# Patient Record
Sex: MALE | Race: WHITE | Employment: OTHER | URBAN - METROPOLITAN AREA
[De-identification: names, ages, dates, MRNs, and addresses within clinical notes are randomized per-mention and may not be internally consistent; named-entity substitution may affect disease eponyms.]

---

## 2017-08-29 PROBLEM — M17.11 PRIMARY OSTEOARTHRITIS OF RIGHT KNEE: Status: ACTIVE | Noted: 2017-08-29

## 2018-03-26 ENCOUNTER — HOSPITAL ENCOUNTER (OUTPATIENT)
Age: 58
Discharge: HOME | End: 2018-03-28

## 2018-03-26 PROCEDURE — 86706 HEP B SURFACE ANTIBODY: CPT

## 2018-03-26 PROCEDURE — 86787 VARICELLA-ZOSTER ANTIBODY: CPT

## 2018-03-26 PROCEDURE — 36415 COLL VENOUS BLD VENIPUNCTURE: CPT

## 2018-03-26 PROCEDURE — 86762 RUBELLA ANTIBODY: CPT

## 2018-03-26 PROCEDURE — 86735 MUMPS ANTIBODY: CPT

## 2018-03-26 PROCEDURE — 86765 RUBEOLA ANTIBODY: CPT

## 2018-03-27 LAB
HBV SURFACE AB TITR SER: REACTIVE {TITER}
MEASLES IMMUNE (IGG): NORMAL
MUMPS AB IGG: NORMAL
RUBELLA ANTIBODY IGG: NORMAL
VARICELLA-ZOSTER VIRUS AB, IGG: NORMAL

## 2018-07-24 ENCOUNTER — OFFICE VISIT (OUTPATIENT)
Dept: ORTHOPEDIC SURGERY | Age: 58
End: 2018-07-24
Payer: COMMERCIAL

## 2018-07-24 VITALS — WEIGHT: 192 LBS | BODY MASS INDEX: 27.49 KG/M2 | TEMPERATURE: 98.5 F | HEIGHT: 70 IN

## 2018-07-24 DIAGNOSIS — M77.11 RIGHT LATERAL EPICONDYLITIS: Primary | ICD-10-CM

## 2018-07-24 DIAGNOSIS — S46.219A BICEPS TENDON TEAR: ICD-10-CM

## 2018-07-24 PROCEDURE — 99214 OFFICE O/P EST MOD 30 MIN: CPT | Performed by: ORTHOPAEDIC SURGERY

## 2018-07-24 PROCEDURE — 20551 NJX 1 TENDON ORIGIN/INSJ: CPT | Performed by: ORTHOPAEDIC SURGERY

## 2018-07-24 RX ORDER — TRIAMCINOLONE ACETONIDE 40 MG/ML
20 INJECTION, SUSPENSION INTRA-ARTICULAR; INTRAMUSCULAR ONCE
Status: COMPLETED | OUTPATIENT
Start: 2018-07-24 | End: 2018-07-24

## 2018-07-24 RX ADMIN — TRIAMCINOLONE ACETONIDE 20 MG: 40 INJECTION, SUSPENSION INTRA-ARTICULAR; INTRAMUSCULAR at 15:52

## 2018-07-24 NOTE — PROGRESS NOTES
(-) multiple sclerosis, (-) muscular dystrophy, (-) RSD,(-) joint pain (-)swelling, (-) joint pain,swelling. Neurologic: (-) epilepsy, (-)seizures,(-) brain tumor,(-) TIA, (-)stroke, (-)headaches, (-)Parkinson disease,(-) memory loss, (-) LOC. Cardiovascular: (-) Chest pain, (-) swelling in legs/feet, (-) SOB, (-) cramping in legs/feet with walking. Subjective:    Constitution:    The patient is alert and oriented x 3, appears to be stated age and in no distress. Ht. 5 ft 10 in., Wt. 192 lbs. Skin:    Upon inspection: the skin appears warm, dry and intact. There is not a previous scar over the affected area. There is not any cellulitis, lymphedema or cutaneous lesions noted in the lower extremities. Upon palpation there is no induration noted. Neurologic:    Gait: normal;  Motor exam of the upper extremities show: The reflexes in biceps/triceps/brachioradialis are equal and symmetric. Sensory exam C5-T1 are normal bilaterally. Cardiovascular: The vascular exam is normal and is well perfused to distal extremities. There are 2+ radial pulses bilaterally, and motor and sensation is intact to median, ulnar, and radial, musclocutaneus, and axillary nerve distribution and grossly symmetric bilaterally. There is cap refill noted less than two seconds in all digits. There is not edema of the bilateral upper extremities. There is not varicosities noted in the distal extremities. Lymph:    Upon palpation,  there is no lymphadenopathy noted in bilateral upper extremities. Musculoskeletal:    Cervical Exam:    On physical exam, Isabella Mulligan is well-developed, well-nourished, oriented to person, place and time. his gait is normal.  On evaluation of his cervical spine, he has full range of motion of the cervical spine without pain. There is no cervical tenderness to palpation.      Shoulder Exam:     On evaluation of his bilaterally upper extremities, his bilateral shoulder has well.  Follow up after MRI

## 2018-07-25 ENCOUNTER — HOSPITAL ENCOUNTER (OUTPATIENT)
Age: 58
Discharge: HOME | End: 2018-07-27
Payer: COMMERCIAL

## 2018-07-25 ENCOUNTER — HOSPITAL ENCOUNTER (OUTPATIENT)
Dept: MRI IMAGING | Age: 58
Discharge: HOME | End: 2018-07-27
Payer: COMMERCIAL

## 2018-07-25 DIAGNOSIS — S46.219A BICEPS TENDON TEAR: ICD-10-CM

## 2018-07-25 PROCEDURE — 73221 MRI JOINT UPR EXTREM W/O DYE: CPT

## 2018-11-15 ENCOUNTER — OFFICE VISIT (OUTPATIENT)
Dept: CARDIOLOGY CLINIC | Age: 58
End: 2018-11-15
Payer: COMMERCIAL

## 2018-11-15 VITALS
HEART RATE: 69 BPM | HEIGHT: 70 IN | DIASTOLIC BLOOD PRESSURE: 72 MMHG | BODY MASS INDEX: 27.49 KG/M2 | SYSTOLIC BLOOD PRESSURE: 132 MMHG | WEIGHT: 192 LBS

## 2018-11-15 DIAGNOSIS — F10.10 ETOH ABUSE: ICD-10-CM

## 2018-11-15 DIAGNOSIS — Z98.61 HISTORY OF PTCA: ICD-10-CM

## 2018-11-15 DIAGNOSIS — R74.01 ALT (SGPT) LEVEL RAISED: ICD-10-CM

## 2018-11-15 DIAGNOSIS — I10 ESSENTIAL HYPERTENSION: ICD-10-CM

## 2018-11-15 DIAGNOSIS — E03.9 ACQUIRED HYPOTHYROIDISM: ICD-10-CM

## 2018-11-15 DIAGNOSIS — I25.10 CORONARY ARTERY DISEASE INVOLVING NATIVE CORONARY ARTERY OF NATIVE HEART WITHOUT ANGINA PECTORIS: Primary | ICD-10-CM

## 2018-11-15 DIAGNOSIS — E78.00 PURE HYPERCHOLESTEROLEMIA: ICD-10-CM

## 2018-11-15 DIAGNOSIS — M15.9 PRIMARY OSTEOARTHRITIS INVOLVING MULTIPLE JOINTS: ICD-10-CM

## 2018-11-15 PROCEDURE — 93000 ELECTROCARDIOGRAM COMPLETE: CPT | Performed by: INTERNAL MEDICINE

## 2018-11-15 PROCEDURE — 99213 OFFICE O/P EST LOW 20 MIN: CPT | Performed by: INTERNAL MEDICINE

## 2018-11-16 NOTE — PROGRESS NOTES
therapy. d. 1/3/2017: Treadmill nuclear stress test: Srinivasan protocol. 10 minutes. 101% of the maximum predicted heart rate. Hypertensive blood pressure response. No chest pain. No ischemic EKG changes and no arrhythmias. Nuclear images were within normal limits showing mild diaphragmatic attenuation artifact with no convincing evidence of any scars or any stress induced ischemia with the gated views showing no regional wall motion abnormality with a calculated ejection fraction of 76%. 4. Carotid US, 2015. Mild plaque in both carotid bulbs and proximal internal carotid arteries suggestive of 1-15% stenosis. Normal bilateral common carotid arteries. Normal bilateral external carotid arteries. Antegrade flow in both vertebral arteries.      PAST MEDICAL HISTORY:  1. As under cardiovascular history. 2. Thrombocytopenia. 3. Hypothyroidism. 4. DJD. 5. Status post right knee arthroscopy in . 6. Status post surgical repair of left hand fracture in .  7. Status post repair of a torn right rotator cuff in 12/10.  8. Tubular adenoma on colonoscopy in .  a. Repeat colonoscopy on 11 was normal.  b. Colonoscopy 2017: Small hyperplastic rectal polyps. One of them was sampled:  Reported negative pathology. Next examination in 10 years. 9. Gastritis on EGD in 2/10. 10. Alcohol abuse: Patient drinks 8-10 beers daily. 11. History of deviated nasal septum, S/P surgery in 2015. 12. Left arm biceps partial tear (after lifting/moving a heavy TV set) on MRI done on 2018.         FAMILY HISTORY:  Mother living at age 80, has coronary artery disease and is status post stenting. She has hypertension and diabetes mellitus. Father  at age 76 from post-operative complications post abdominal aortic aneurysm repair: Had hypertension and hypothyroidism.      SOCIAL HISTORY:  Patient does not smoke. He drinks 8-10 beers a day.  He is a nurse at [employer].       O:  COMPLETE

## 2019-03-15 DIAGNOSIS — M17.11 PRIMARY OSTEOARTHRITIS OF RIGHT KNEE: Primary | ICD-10-CM

## 2019-03-21 ENCOUNTER — OFFICE VISIT (OUTPATIENT)
Dept: ORTHOPEDIC SURGERY | Age: 59
End: 2019-03-21
Payer: COMMERCIAL

## 2019-03-21 VITALS — BODY MASS INDEX: 27.2 KG/M2 | HEIGHT: 70 IN | WEIGHT: 190 LBS

## 2019-03-21 DIAGNOSIS — S83.241A ACUTE MEDIAL MENISCUS TEAR OF RIGHT KNEE, INITIAL ENCOUNTER: Primary | ICD-10-CM

## 2019-03-21 PROCEDURE — 99214 OFFICE O/P EST MOD 30 MIN: CPT | Performed by: ORTHOPAEDIC SURGERY

## 2019-03-28 ENCOUNTER — OFFICE VISIT (OUTPATIENT)
Dept: ORTHOPEDIC SURGERY | Age: 59
End: 2019-03-28
Payer: COMMERCIAL

## 2019-03-28 VITALS — HEIGHT: 70 IN | BODY MASS INDEX: 27.2 KG/M2 | TEMPERATURE: 98 F | WEIGHT: 190 LBS

## 2019-03-28 DIAGNOSIS — S83.241A ACUTE MEDIAL MENISCUS TEAR OF RIGHT KNEE, INITIAL ENCOUNTER: ICD-10-CM

## 2019-03-28 DIAGNOSIS — M17.11 PRIMARY OSTEOARTHRITIS OF RIGHT KNEE: Primary | ICD-10-CM

## 2019-03-28 DIAGNOSIS — M25.461 EFFUSION OF RIGHT KNEE: ICD-10-CM

## 2019-03-28 PROCEDURE — 99214 OFFICE O/P EST MOD 30 MIN: CPT | Performed by: ORTHOPAEDIC SURGERY

## 2019-03-28 PROCEDURE — 20610 DRAIN/INJ JOINT/BURSA W/O US: CPT | Performed by: ORTHOPAEDIC SURGERY

## 2019-03-28 RX ORDER — TRIAMCINOLONE ACETONIDE 40 MG/ML
40 INJECTION, SUSPENSION INTRA-ARTICULAR; INTRAMUSCULAR ONCE
Status: COMPLETED | OUTPATIENT
Start: 2019-03-28 | End: 2019-03-28

## 2019-03-28 RX ADMIN — TRIAMCINOLONE ACETONIDE 40 MG: 40 INJECTION, SUSPENSION INTRA-ARTICULAR; INTRAMUSCULAR at 10:47

## 2019-05-03 ENCOUNTER — HOSPITAL ENCOUNTER (OUTPATIENT)
Age: 59
Discharge: HOME | End: 2019-05-03
Payer: COMMERCIAL

## 2019-05-03 LAB
ALBUMIN SERPL-MCNC: 4.6 G/DL (ref 3.5–5.2)
ALP BLD-CCNC: 66 U/L (ref 40–129)
ALT SERPL-CCNC: 37 U/L (ref 0–40)
ANION GAP SERPL CALCULATED.3IONS-SCNC: 11 MMOL/L (ref 7–16)
AST SERPL-CCNC: 29 U/L (ref 0–39)
BASOPHILS ABSOLUTE: 0.03 E9/L (ref 0–0.2)
BASOPHILS RELATIVE PERCENT: 0.4 % (ref 0–2)
BILIRUB SERPL-MCNC: 0.6 MG/DL (ref 0–1.2)
BUN BLDV-MCNC: 9 MG/DL (ref 8–23)
CALCIUM SERPL-MCNC: 9.6 MG/DL (ref 8.6–10.2)
CHLORIDE BLD-SCNC: 103 MMOL/L (ref 98–107)
CHOLESTEROL, TOTAL: 179 MG/DL (ref 0–199)
CO2: 28 MMOL/L (ref 22–29)
CREAT SERPL-MCNC: 0.8 MG/DL (ref 0.7–1.2)
EOSINOPHILS ABSOLUTE: 0.14 E9/L (ref 0.05–0.5)
EOSINOPHILS RELATIVE PERCENT: 2 % (ref 0–6)
GFR AFRICAN AMERICAN: >60
GFR NON-AFRICAN AMERICAN: >60 ML/MIN/1.73
GLUCOSE BLD-MCNC: 89 MG/DL (ref 74–99)
HBA1C MFR BLD: 5.1 % (ref 4–5.6)
HCT VFR BLD CALC: 45.5 % (ref 37–54)
HDLC SERPL-MCNC: 58 MG/DL
HEMOGLOBIN: 15.8 G/DL (ref 12.5–16.5)
IMMATURE GRANULOCYTES #: 0.06 E9/L
IMMATURE GRANULOCYTES %: 0.9 % (ref 0–5)
LDL CHOLESTEROL CALCULATED: 107 MG/DL (ref 0–99)
LYMPHOCYTES ABSOLUTE: 1.8 E9/L (ref 1.5–4)
LYMPHOCYTES RELATIVE PERCENT: 25.6 % (ref 20–42)
MCH RBC QN AUTO: 31.9 PG (ref 26–35)
MCHC RBC AUTO-ENTMCNC: 34.7 % (ref 32–34.5)
MCV RBC AUTO: 91.7 FL (ref 80–99.9)
MONOCYTES ABSOLUTE: 0.55 E9/L (ref 0.1–0.95)
MONOCYTES RELATIVE PERCENT: 7.8 % (ref 2–12)
NEUTROPHILS ABSOLUTE: 4.45 E9/L (ref 1.8–7.3)
NEUTROPHILS RELATIVE PERCENT: 63.3 % (ref 43–80)
PDW BLD-RTO: 12.6 FL (ref 11.5–15)
PLATELET # BLD: 142 E9/L (ref 130–450)
PMV BLD AUTO: 10.4 FL (ref 7–12)
POTASSIUM SERPL-SCNC: 4.1 MMOL/L (ref 3.5–5)
PROSTATE SPECIFIC ANTIGEN: 4.57 NG/ML (ref 0–4)
RBC # BLD: 4.96 E12/L (ref 3.8–5.8)
SODIUM BLD-SCNC: 142 MMOL/L (ref 132–146)
T4 FREE: 1.32 NG/DL (ref 0.93–1.7)
TOTAL PROTEIN: 6.9 G/DL (ref 6.4–8.3)
TRIGL SERPL-MCNC: 71 MG/DL (ref 0–149)
TSH SERPL DL<=0.05 MIU/L-ACNC: 2.52 UIU/ML (ref 0.27–4.2)
VLDLC SERPL CALC-MCNC: 14 MG/DL
WBC # BLD: 7 E9/L (ref 4.5–11.5)

## 2019-05-03 PROCEDURE — 80061 LIPID PANEL: CPT

## 2019-05-03 PROCEDURE — 84443 ASSAY THYROID STIM HORMONE: CPT

## 2019-05-03 PROCEDURE — 85025 COMPLETE CBC W/AUTO DIFF WBC: CPT

## 2019-05-03 PROCEDURE — 80053 COMPREHEN METABOLIC PANEL: CPT

## 2019-05-03 PROCEDURE — 84439 ASSAY OF FREE THYROXINE: CPT

## 2019-05-03 PROCEDURE — 36415 COLL VENOUS BLD VENIPUNCTURE: CPT

## 2019-05-03 PROCEDURE — G0103 PSA SCREENING: HCPCS

## 2019-05-03 PROCEDURE — 83036 HEMOGLOBIN GLYCOSYLATED A1C: CPT

## 2019-05-20 ENCOUNTER — OFFICE VISIT (OUTPATIENT)
Dept: ORTHOPEDIC SURGERY | Age: 59
End: 2019-05-20
Payer: COMMERCIAL

## 2019-05-20 VITALS — WEIGHT: 190 LBS | BODY MASS INDEX: 27.2 KG/M2 | HEIGHT: 70 IN | TEMPERATURE: 98.5 F

## 2019-05-20 DIAGNOSIS — M25.461 EFFUSION OF RIGHT KNEE: ICD-10-CM

## 2019-05-20 DIAGNOSIS — M17.11 PRIMARY OSTEOARTHRITIS OF RIGHT KNEE: Primary | ICD-10-CM

## 2019-05-20 PROCEDURE — 20611 DRAIN/INJ JOINT/BURSA W/US: CPT | Performed by: ORTHOPAEDIC SURGERY

## 2019-05-20 PROCEDURE — 99213 OFFICE O/P EST LOW 20 MIN: CPT | Performed by: ORTHOPAEDIC SURGERY

## 2019-05-20 RX ORDER — TRIAMCINOLONE ACETONIDE 40 MG/ML
40 INJECTION, SUSPENSION INTRA-ARTICULAR; INTRAMUSCULAR ONCE
Status: COMPLETED | OUTPATIENT
Start: 2019-05-20 | End: 2019-05-20

## 2019-05-20 RX ADMIN — TRIAMCINOLONE ACETONIDE 40 MG: 40 INJECTION, SUSPENSION INTRA-ARTICULAR; INTRAMUSCULAR at 10:49

## 2019-05-20 NOTE — PROGRESS NOTES
Chief Complaint   Patient presents with    Knee Pain     right knee pain continues, patient feels it needs aspirated. Subjective:     Patient ID: Leonid Fox is a 60 y.o..  male    Knee Pain  Patient complains of right knee pain today. He has had it drained previously. Past Medical History:   Diagnosis Date    Alcohol abuse     drinks 8-10 beers daily    CAD (coronary artery disease)     Chest pain 1/26/11    suggestive of unstable angina    DJD (degenerative joint disease)     Gastritis 2/10    on EGD    H/O exercise stress test 1/26/11    Treadmill nuclear stress test: Srinivasan protocol. 8 min, 30 sec. 95% max predicted heart rate. Physiologic BP response. Patient developed chest pain at 7 1/2 min into exercise. Had around 2 mm ST segment depressions. Nuclear images read as showing no evidence of stress-induced ischemia    H/O exercise stress test 9/9/11    Treadmill nuclear stress test: Srinivasan protocol. 9 min and 30 sec. 101% of max predicted heart rate. Hypertensive BP response. No CP. No ischemic EKG changes. Nuclear images showed soft tissue attenuation artifacts. There did not appear to be any evidence of stress-induced ischemia on study. Gated views did not show any regional wall motion abnormality with possible hyperdynamic LVSF, EF 77%     Hyperlipidemia     Hypertension     Hypothyroidism     PONV (postoperative nausea and vomiting)     Thrombocytopenia (HCC)     Thyroid disease      Past Surgical History:   Procedure Laterality Date    CARDIAC SURGERY  1/2011    PTCA with stent    COLONOSCOPY  8/4/11    normal    DIAGNOSTIC CARDIAC CATH LAB PROCEDURE  1/27/11    Normal LV size and systolic function. Elevated LVEDP suggestive of diastolic dysfunction. Mild peak-to-peak gradient across the AV on pullback.  Successful balloon angioplasty with deployment of ROSE to LAD with excellent results with adjunctive Angiomax therapy     HAND SURGERY Left 1978    fracture    KNEE ARTHROSCOPY Right 2006    ROTATOR CUFF REPAIR Right 12/2010    SEPTOPLASTY  12/2015       Current Outpatient Medications:     Coenzyme Q10 (CO Q 10) 100 MG CAPS, Take by mouth, Disp: , Rfl:     aspirin 81 MG tablet, Take 81 mg by mouth daily, Disp: , Rfl:     Cholecalciferol (VITAMIN D) 2000 UNITS CAPS capsule, Take 1,000 Units by mouth daily , Disp: , Rfl:     olmesartan-hydrochlorothiazide (BENICAR HCT) 40-12.5 MG per tablet, Take 1 tablet by mouth daily, Disp: , Rfl:     Multiple Vitamins-Minerals (MULTIVITAL-M PO), Take  by mouth. Woody vits, Disp: , Rfl:     rosuvastatin (CRESTOR) 20 MG tablet, Take 20 mg by mouth every other day.  , Disp: , Rfl:     levothyroxine (SYNTHROID) 50 MCG tablet, Take 50 mcg by mouth daily. , Disp: , Rfl:   No Known Allergies  Social History     Socioeconomic History    Marital status:      Spouse name: Not on file    Number of children: Not on file    Years of education: Not on file    Highest education level: Not on file   Occupational History    Not on file   Social Needs    Financial resource strain: Not on file    Food insecurity:     Worry: Not on file     Inability: Not on file    Transportation needs:     Medical: Not on file     Non-medical: Not on file   Tobacco Use    Smoking status: Never Smoker    Smokeless tobacco: Never Used   Substance and Sexual Activity    Alcohol use: Yes     Comment: 6-8 beers daily    Drug use: No    Sexual activity: Yes     Partners: Female   Lifestyle    Physical activity:     Days per week: Not on file     Minutes per session: Not on file    Stress: Not on file   Relationships    Social connections:     Talks on phone: Not on file     Gets together: Not on file     Attends Voodoo service: Not on file     Active member of club or organization: Not on file     Attends meetings of clubs or organizations: Not on file     Relationship status: Not on file    Intimate partner violence:     Fear of current or ex partner: Not on file     Emotionally abused: Not on file     Physically abused: Not on file     Forced sexual activity: Not on file   Other Topics Concern    Not on file   Social History Narrative    ** Merged History Encounter **          Family History   Problem Relation Age of Onset    Coronary Art Dis Mother     Heart Surgery Mother     Hypertension Mother     Diabetes Mother     Heart Surgery Father     Hypertension Father     Thyroid Disease Father     Hypertension Sister     Hypertension Brother     Hypertension Brother          REVIEW OF SYSTEMS:     General/Constitution:  (-)weight loss, (-)fever, (-)chills, (-)weakness. Skin: (-) rash,(-) psoriasis,(-) eczema, (-)skin cancer. Musculoskeletal: (-) fractures,  (-) dislocations,(-) collagen vascular disease, (-) fibromyalgia, (-) multiple sclerosis, (-) muscular dystrophy, (-) RSD,(-) joint pain (-)swelling, (-) joint pain,swelling. Neurologic: (-) epilepsy, (-)seizures,(-) brain tumor,(-) TIA, (-)stroke, (-)headaches, (-)Parkinson disease,(-) memory loss, (-) LOC. Cardiovascular: (-) Chest pain, (-) swelling in legs/feet, (-) SOB, (-) cramping in legs/feet with walking. Respiratory: (-) SOB, (-) Coughing, (-) night sweats. GI: (-) nausea, (-) vomiting, (-) diarrhea, (-) blood in stool, (-) gastric ulcer. Psychiatric: (-) Depression, (-) Anxiety, (-) bipolar disease, (-) Alzheimer's Disease  Allergic/Immunologic: (-) allergies latex, (-) allergies metal, (-) skin sensitivity. Hematlogic: (-) anemia, (-) blood transfusion, (-) DVT/PE, (-) Clotting disorders    Subjective:    Constitution:  Temp 98.5 °F (36.9 °C)   Ht 5' 10\" (1.778 m)   Wt 190 lb (86.2 kg)   BMI 27.26 kg/m²     Psycihatric:  The patient is alert and oriented x 3, appears to be stated age and in no distress. Respiratory:  Respiratory effort is not labored. Patient is not gasping. Palpation of the chest reveals no tactile fremitus.     Skin:  Upon inspection: the Knee:  Lachman's negative, Anterior Drawer negative, Posterior Drawer negative  Bridger's positive, Thallasy  positive,   PF grind test negative, Apprehension test negative, Patellar J sign  negative  L. Knee:  Lachman's negative, Anterior Drawer negative, Posterior Drawer negative  Bridger's negative, Thallasy  negative,   PF grind test negative, Apprehension test negative,  Patellar J sign  negative    Xray Exam:  None today    MRI:  1. Menisci and ligaments are intact. 2. Focal area of grade 3 chondrosis along the central portion of the   lateral tibial plateau. 3. Additional grade 3/4 chondrosis along the medial patellar facet and   medial trochlea with suggestion of a delamination type tear along the   medial patellar facet. 4. Moderate-sized joint effusion and small Baker's cyst.       Radiographic findings reviewed with patient    Assessment:  Encounter Diagnoses   Name Primary?  Primary osteoarthritis of right knee Yes    Effusion of right knee        Plan:  I had a lengthy discussion with the patient regarding their diagnosis. I explained treatment options including surgical vs non surgical treatment. I reviewed in detail the risks and benefits and outlined the procedure in detail with expected outcomes and possible complications. I also discussed non surgical treatment such as injections (CSI and visco supplementation), physical therapy, topical creams and NSAID's. They have elected for   consevative management at this time. The skin was prepped with alcohol and betadine. A 60 ml syringe with a 18 gauge needle was inserted into the on right knee joint space. I retained 0mL of fluid. I will proceed with a cortisone injection in the Right knee. Verbal and written consent was obtained for the injections. The skin was prepped with alcohol. 1mL of Kenalog 40mg and 9mL of 0.25% Marcaine was  injected to Right knee under ultrasound guidance.  The injection was given through the lateral side of the knee. The patient tolerated the injection well.  I will see the patient back prn

## 2019-09-10 ENCOUNTER — OFFICE VISIT (OUTPATIENT)
Dept: ORTHOPEDIC SURGERY | Age: 59
End: 2019-09-10
Payer: COMMERCIAL

## 2019-09-10 VITALS — WEIGHT: 192 LBS | BODY MASS INDEX: 26.88 KG/M2 | HEIGHT: 71 IN | TEMPERATURE: 98 F

## 2019-09-10 DIAGNOSIS — M25.461 EFFUSION OF RIGHT KNEE: ICD-10-CM

## 2019-09-10 DIAGNOSIS — M17.11 PRIMARY OSTEOARTHRITIS OF RIGHT KNEE: Primary | ICD-10-CM

## 2019-09-10 PROCEDURE — 99213 OFFICE O/P EST LOW 20 MIN: CPT | Performed by: ORTHOPAEDIC SURGERY

## 2019-09-10 PROCEDURE — 20610 DRAIN/INJ JOINT/BURSA W/O US: CPT | Performed by: ORTHOPAEDIC SURGERY

## 2019-09-10 RX ORDER — TRIAMCINOLONE ACETONIDE 40 MG/ML
40 INJECTION, SUSPENSION INTRA-ARTICULAR; INTRAMUSCULAR ONCE
Status: COMPLETED | OUTPATIENT
Start: 2019-09-10 | End: 2019-09-10

## 2019-09-10 RX ORDER — ATENOLOL 25 MG/1
TABLET ORAL
COMMUNITY
End: 2019-11-11 | Stop reason: ALTCHOICE

## 2019-09-10 RX ORDER — CELECOXIB 200 MG/1
CAPSULE ORAL
COMMUNITY
End: 2019-11-11 | Stop reason: ALTCHOICE

## 2019-09-10 RX ORDER — AMLODIPINE BESYLATE 5 MG/1
5 TABLET ORAL NIGHTLY
COMMUNITY
Start: 2019-05-08 | End: 2021-10-04 | Stop reason: SDUPTHER

## 2019-09-10 RX ORDER — OLMESARTAN MEDOXOMIL 40 MG/1
TABLET ORAL
COMMUNITY
End: 2019-11-11

## 2019-09-10 RX ORDER — HYDROCHLOROTHIAZIDE 12.5 MG/1
CAPSULE, GELATIN COATED ORAL
COMMUNITY
End: 2019-11-11 | Stop reason: ALTCHOICE

## 2019-09-10 RX ADMIN — TRIAMCINOLONE ACETONIDE 40 MG: 40 INJECTION, SUSPENSION INTRA-ARTICULAR; INTRAMUSCULAR at 10:13

## 2019-09-10 NOTE — PROGRESS NOTES
Chief Complaint   Patient presents with    Knee Pain     Right Knee x 1 week, swelling, and tightness       Subjective:     Patient ID: Dee Gooden is a 60 y.o..  male    Knee Pain  Patient complains of right knee pain today. He has had it drained previously. Past Medical History:   Diagnosis Date    Alcohol abuse     drinks 8-10 beers daily    CAD (coronary artery disease)     Chest pain 1/26/11    suggestive of unstable angina    DJD (degenerative joint disease)     Gastritis 2/10    on EGD    H/O exercise stress test 1/26/11    Treadmill nuclear stress test: Srinivasan protocol. 8 min, 30 sec. 95% max predicted heart rate. Physiologic BP response. Patient developed chest pain at 7 1/2 min into exercise. Had around 2 mm ST segment depressions. Nuclear images read as showing no evidence of stress-induced ischemia    H/O exercise stress test 9/9/11    Treadmill nuclear stress test: Srinivasan protocol. 9 min and 30 sec. 101% of max predicted heart rate. Hypertensive BP response. No CP. No ischemic EKG changes. Nuclear images showed soft tissue attenuation artifacts. There did not appear to be any evidence of stress-induced ischemia on study. Gated views did not show any regional wall motion abnormality with possible hyperdynamic LVSF, EF 77%     Hyperlipidemia     Hypertension     Hypothyroidism     PONV (postoperative nausea and vomiting)     Thrombocytopenia (HCC)     Thyroid disease      Past Surgical History:   Procedure Laterality Date    CARDIAC SURGERY  1/2011    PTCA with stent    COLONOSCOPY  8/4/11    normal    DIAGNOSTIC CARDIAC CATH LAB PROCEDURE  1/27/11    Normal LV size and systolic function. Elevated LVEDP suggestive of diastolic dysfunction. Mild peak-to-peak gradient across the AV on pullback.  Successful balloon angioplasty with deployment of ROSE to LAD with excellent results with adjunctive Angiomax therapy     HAND SURGERY Left 1978    fracture    KNEE ARTHROSCOPY Right needle was inserted into the on right knee joint space. I retained 10mL of fluid. I will proceed with a cortisone injection in the Right knee. Verbal and written consent was obtained for the injections. The skin was prepped with alcohol. 1mL of Kenalog 40mg and 9mL of 0.25% Marcaine was  injected to Right knee using ultrasound guidance. The injection was given through the lateral side of the knee. The patient tolerated the injection well. I will see the patient back after visco approval   The patient has failed conservative measures such as NSAIDS, HEP, and cortisone injections. He is an excellent candidate for viscous supplementation injections including euflexxa in the Right knee.    We will contact the patient's insurance company and see them back in the office once we have received approval.

## 2019-09-17 ENCOUNTER — TELEPHONE (OUTPATIENT)
Dept: ORTHOPEDIC SURGERY | Age: 59
End: 2019-09-17

## 2019-09-18 ENCOUNTER — NURSE ONLY (OUTPATIENT)
Dept: ORTHOPEDIC SURGERY | Age: 59
End: 2019-09-18
Payer: COMMERCIAL

## 2019-09-18 DIAGNOSIS — M17.11 PRIMARY OSTEOARTHRITIS OF RIGHT KNEE: Primary | ICD-10-CM

## 2019-09-18 PROCEDURE — 20610 DRAIN/INJ JOINT/BURSA W/O US: CPT | Performed by: NURSE PRACTITIONER

## 2019-09-30 ENCOUNTER — HOSPITAL ENCOUNTER (OUTPATIENT)
Age: 59
Discharge: HOME | End: 2019-10-02
Payer: COMMERCIAL

## 2019-09-30 LAB — PROSTATE SPECIFIC ANTIGEN: 5.3 NG/ML (ref 0–4)

## 2019-09-30 PROCEDURE — 84153 ASSAY OF PSA TOTAL: CPT

## 2019-09-30 PROCEDURE — 36415 COLL VENOUS BLD VENIPUNCTURE: CPT

## 2019-10-22 ENCOUNTER — OFFICE VISIT (OUTPATIENT)
Dept: ORTHOPEDIC SURGERY | Age: 59
End: 2019-10-22
Payer: COMMERCIAL

## 2019-10-22 ENCOUNTER — HOSPITAL ENCOUNTER (OUTPATIENT)
Age: 59
Discharge: HOME | End: 2019-10-24
Payer: COMMERCIAL

## 2019-10-22 VITALS — TEMPERATURE: 98 F | HEIGHT: 70 IN | WEIGHT: 190 LBS | BODY MASS INDEX: 27.2 KG/M2

## 2019-10-22 DIAGNOSIS — M17.11 PRIMARY OSTEOARTHRITIS OF RIGHT KNEE: Primary | ICD-10-CM

## 2019-10-22 DIAGNOSIS — M25.461 EFFUSION OF RIGHT KNEE: ICD-10-CM

## 2019-10-22 PROCEDURE — 87205 SMEAR GRAM STAIN: CPT

## 2019-10-22 PROCEDURE — 99214 OFFICE O/P EST MOD 30 MIN: CPT | Performed by: ORTHOPAEDIC SURGERY

## 2019-10-22 PROCEDURE — 87070 CULTURE OTHR SPECIMN AEROBIC: CPT

## 2019-10-22 PROCEDURE — 89051 BODY FLUID CELL COUNT: CPT

## 2019-10-22 PROCEDURE — 20610 DRAIN/INJ JOINT/BURSA W/O US: CPT | Performed by: ORTHOPAEDIC SURGERY

## 2019-10-23 ENCOUNTER — TELEPHONE (OUTPATIENT)
Dept: CARDIOLOGY CLINIC | Age: 59
End: 2019-10-23

## 2019-10-23 LAB
APPEARANCE FLUID: NORMAL
CELL COUNT FLUID TYPE: NORMAL
COLOR FLUID: YELLOW
GRAM STAIN ORDERABLE: NORMAL
MONOCYTE, FLUID: 55 %
NEUTROPHIL, FLUID: 45 %
NUCLEATED CELLS FLUID: 2013 /UL
RBC FLUID: 8000 /UL

## 2019-10-28 DIAGNOSIS — I25.10 CORONARY ARTERY DISEASE INVOLVING NATIVE CORONARY ARTERY OF NATIVE HEART WITHOUT ANGINA PECTORIS: ICD-10-CM

## 2019-10-28 DIAGNOSIS — Z01.818 PREOPERATIVE CLEARANCE: Primary | ICD-10-CM

## 2019-10-28 DIAGNOSIS — Z98.61 HISTORY OF PTCA: ICD-10-CM

## 2019-10-28 LAB
BODY FLUID CULTURE, STERILE: NORMAL
GRAM STAIN RESULT: NORMAL

## 2019-10-29 ENCOUNTER — HOSPITAL ENCOUNTER (OUTPATIENT)
Dept: NON INVASIVE DIAGNOSTICS | Age: 59
Discharge: HOME | End: 2019-10-29
Payer: COMMERCIAL

## 2019-10-29 ENCOUNTER — HOSPITAL ENCOUNTER (OUTPATIENT)
Dept: NUCLEAR MEDICINE | Age: 59
Discharge: HOME | End: 2019-10-29
Payer: COMMERCIAL

## 2019-10-29 VITALS — HEIGHT: 70 IN | WEIGHT: 190 LBS | BODY MASS INDEX: 27.2 KG/M2

## 2019-10-29 DIAGNOSIS — I25.10 CORONARY ARTERY DISEASE INVOLVING NATIVE CORONARY ARTERY OF NATIVE HEART WITHOUT ANGINA PECTORIS: ICD-10-CM

## 2019-10-29 DIAGNOSIS — Z01.818 PREOPERATIVE CLEARANCE: ICD-10-CM

## 2019-10-29 DIAGNOSIS — Z98.61 HISTORY OF PTCA: ICD-10-CM

## 2019-10-29 LAB
LV EF: 82 %
LVEF MODALITY: NORMAL

## 2019-10-29 PROCEDURE — A9500 TC99M SESTAMIBI: HCPCS | Performed by: RADIOLOGY

## 2019-10-29 PROCEDURE — 93016 CV STRESS TEST SUPVJ ONLY: CPT | Performed by: INTERNAL MEDICINE

## 2019-10-29 PROCEDURE — 93017 CV STRESS TEST TRACING ONLY: CPT

## 2019-10-29 PROCEDURE — 78452 HT MUSCLE IMAGE SPECT MULT: CPT

## 2019-10-29 PROCEDURE — 93018 CV STRESS TEST I&R ONLY: CPT | Performed by: INTERNAL MEDICINE

## 2019-10-29 PROCEDURE — 3430000000 HC RX DIAGNOSTIC RADIOPHARMACEUTICAL: Performed by: RADIOLOGY

## 2019-10-29 RX ADMIN — Medication 30 MILLICURIE: at 13:54

## 2019-10-29 RX ADMIN — Medication 10 MILLICURIE: at 11:42

## 2019-10-30 ENCOUNTER — TELEPHONE (OUTPATIENT)
Dept: CARDIOLOGY CLINIC | Age: 59
End: 2019-10-30

## 2019-11-06 ENCOUNTER — HOSPITAL ENCOUNTER (OUTPATIENT)
Age: 59
Discharge: HOME | End: 2019-11-08
Payer: COMMERCIAL

## 2019-11-06 LAB
PROSTATE SPECIFIC ANTIGEN: 5.14 NG/ML
PROSTATE SPECIFIC ANTIGEN: 5.14 NG/ML (ref 0–4)

## 2019-11-06 PROCEDURE — G0103 PSA SCREENING: HCPCS

## 2019-11-06 PROCEDURE — 36415 COLL VENOUS BLD VENIPUNCTURE: CPT

## 2019-11-11 ENCOUNTER — TELEPHONE (OUTPATIENT)
Dept: CARDIOLOGY CLINIC | Age: 59
End: 2019-11-11

## 2019-11-11 RX ORDER — OLMESARTAN MEDOXOMIL AND HYDROCHLOROTHIAZIDE 40/12.5 40; 12.5 MG/1; MG/1
1 TABLET ORAL DAILY
COMMUNITY
End: 2020-01-07

## 2019-11-12 ENCOUNTER — ANESTHESIA EVENT (OUTPATIENT)
Dept: OPERATING ROOM | Age: 59
End: 2019-11-12
Payer: COMMERCIAL

## 2019-11-12 ENCOUNTER — HOSPITAL ENCOUNTER (OUTPATIENT)
Age: 59
Discharge: HOME | End: 2019-11-14
Payer: COMMERCIAL

## 2019-11-12 DIAGNOSIS — M17.11 OSTEOARTHRITIS OF RIGHT KNEE, UNSPECIFIED OSTEOARTHRITIS TYPE: ICD-10-CM

## 2019-11-12 LAB
ANION GAP SERPL CALCULATED.3IONS-SCNC: 16 MMOL/L (ref 7–16)
BUN BLDV-MCNC: 12 MG/DL (ref 8–23)
CALCIUM SERPL-MCNC: 9.6 MG/DL (ref 8.6–10.2)
CHLORIDE BLD-SCNC: 100 MMOL/L (ref 98–107)
CO2: 23 MMOL/L (ref 22–29)
CREAT SERPL-MCNC: 0.8 MG/DL (ref 0.7–1.2)
GFR AFRICAN AMERICAN: >60
GFR NON-AFRICAN AMERICAN: >60 ML/MIN/1.73
GLUCOSE BLD-MCNC: 104 MG/DL (ref 74–99)
HCT VFR BLD CALC: 44.9 % (ref 37–54)
HEMOGLOBIN: 15.3 G/DL (ref 12.5–16.5)
MCH RBC QN AUTO: 32.5 PG (ref 26–35)
MCHC RBC AUTO-ENTMCNC: 34.1 % (ref 32–34.5)
MCV RBC AUTO: 95.3 FL (ref 80–99.9)
PDW BLD-RTO: 12.5 FL (ref 11.5–15)
PLATELET # BLD: 143 E9/L (ref 130–450)
PMV BLD AUTO: 11.5 FL (ref 7–12)
POTASSIUM SERPL-SCNC: 4 MMOL/L (ref 3.5–5)
RBC # BLD: 4.71 E12/L (ref 3.8–5.8)
SODIUM BLD-SCNC: 139 MMOL/L (ref 132–146)
WBC # BLD: 6.9 E9/L (ref 4.5–11.5)

## 2019-11-12 PROCEDURE — 85027 COMPLETE CBC AUTOMATED: CPT

## 2019-11-12 PROCEDURE — 36415 COLL VENOUS BLD VENIPUNCTURE: CPT

## 2019-11-12 PROCEDURE — 80048 BASIC METABOLIC PNL TOTAL CA: CPT

## 2019-11-14 ENCOUNTER — ANESTHESIA (OUTPATIENT)
Dept: OPERATING ROOM | Age: 59
End: 2019-11-14
Payer: COMMERCIAL

## 2019-11-14 ENCOUNTER — HOSPITAL ENCOUNTER (OUTPATIENT)
Age: 59
Setting detail: OUTPATIENT SURGERY
Discharge: HOME | End: 2019-11-14
Attending: ORTHOPAEDIC SURGERY | Admitting: ORTHOPAEDIC SURGERY
Payer: COMMERCIAL

## 2019-11-14 VITALS
RESPIRATION RATE: 16 BRPM | HEIGHT: 70 IN | SYSTOLIC BLOOD PRESSURE: 148 MMHG | HEART RATE: 74 BPM | TEMPERATURE: 98 F | OXYGEN SATURATION: 98 % | DIASTOLIC BLOOD PRESSURE: 87 MMHG | BODY MASS INDEX: 27.2 KG/M2 | WEIGHT: 190 LBS

## 2019-11-14 VITALS
TEMPERATURE: 98.6 F | SYSTOLIC BLOOD PRESSURE: 81 MMHG | OXYGEN SATURATION: 100 % | RESPIRATION RATE: 7 BRPM | DIASTOLIC BLOOD PRESSURE: 51 MMHG

## 2019-11-14 DIAGNOSIS — S83.231A COMPLEX TEAR OF MEDIAL MENISCUS OF RIGHT KNEE AS CURRENT INJURY, INITIAL ENCOUNTER: ICD-10-CM

## 2019-11-14 DIAGNOSIS — M17.11 OSTEOARTHRITIS OF RIGHT KNEE, UNSPECIFIED OSTEOARTHRITIS TYPE: Primary | ICD-10-CM

## 2019-11-14 PROCEDURE — 2580000003 HC RX 258: Performed by: ORTHOPAEDIC SURGERY

## 2019-11-14 PROCEDURE — 6360000002 HC RX W HCPCS: Performed by: NURSE ANESTHETIST, CERTIFIED REGISTERED

## 2019-11-14 PROCEDURE — 6360000002 HC RX W HCPCS: Performed by: NURSE PRACTITIONER

## 2019-11-14 PROCEDURE — 6370000000 HC RX 637 (ALT 250 FOR IP): Performed by: ANESTHESIOLOGY

## 2019-11-14 PROCEDURE — 87070 CULTURE OTHR SPECIMN AEROBIC: CPT

## 2019-11-14 PROCEDURE — 87205 SMEAR GRAM STAIN: CPT

## 2019-11-14 PROCEDURE — 3700000001 HC ADD 15 MINUTES (ANESTHESIA): Performed by: ORTHOPAEDIC SURGERY

## 2019-11-14 PROCEDURE — 87075 CULTR BACTERIA EXCEPT BLOOD: CPT

## 2019-11-14 PROCEDURE — 7100000010 HC PHASE II RECOVERY - FIRST 15 MIN: Performed by: ORTHOPAEDIC SURGERY

## 2019-11-14 PROCEDURE — 2709999900 HC NON-CHARGEABLE SUPPLY: Performed by: ORTHOPAEDIC SURGERY

## 2019-11-14 PROCEDURE — 2500000003 HC RX 250 WO HCPCS: Performed by: ORTHOPAEDIC SURGERY

## 2019-11-14 PROCEDURE — 3600000013 HC SURGERY LEVEL 3 ADDTL 15MIN: Performed by: ORTHOPAEDIC SURGERY

## 2019-11-14 PROCEDURE — 2720000010 HC SURG SUPPLY STERILE: Performed by: ORTHOPAEDIC SURGERY

## 2019-11-14 PROCEDURE — 3600000003 HC SURGERY LEVEL 3 BASE: Performed by: ORTHOPAEDIC SURGERY

## 2019-11-14 PROCEDURE — 7100000000 HC PACU RECOVERY - FIRST 15 MIN: Performed by: ORTHOPAEDIC SURGERY

## 2019-11-14 PROCEDURE — 2500000003 HC RX 250 WO HCPCS: Performed by: NURSE ANESTHETIST, CERTIFIED REGISTERED

## 2019-11-14 PROCEDURE — 2580000003 HC RX 258: Performed by: ANESTHESIOLOGY

## 2019-11-14 PROCEDURE — 7100000011 HC PHASE II RECOVERY - ADDTL 15 MIN: Performed by: ORTHOPAEDIC SURGERY

## 2019-11-14 PROCEDURE — 29877 ARTHRS KNEE SURG DBRDMT/SHVG: CPT | Performed by: ORTHOPAEDIC SURGERY

## 2019-11-14 PROCEDURE — 3700000000 HC ANESTHESIA ATTENDED CARE: Performed by: ORTHOPAEDIC SURGERY

## 2019-11-14 PROCEDURE — 7100000001 HC PACU RECOVERY - ADDTL 15 MIN: Performed by: ORTHOPAEDIC SURGERY

## 2019-11-14 RX ORDER — SODIUM CHLORIDE, SODIUM LACTATE, POTASSIUM CHLORIDE, CALCIUM CHLORIDE 600; 310; 30; 20 MG/100ML; MG/100ML; MG/100ML; MG/100ML
INJECTION, SOLUTION INTRAVENOUS CONTINUOUS
Status: DISCONTINUED | OUTPATIENT
Start: 2019-11-14 | End: 2019-11-14 | Stop reason: HOSPADM

## 2019-11-14 RX ORDER — MAGNESIUM HYDROXIDE 1200 MG/15ML
LIQUID ORAL CONTINUOUS PRN
Status: COMPLETED | OUTPATIENT
Start: 2019-11-14 | End: 2019-11-14

## 2019-11-14 RX ORDER — SCOLOPAMINE TRANSDERMAL SYSTEM 1 MG/1
1 PATCH, EXTENDED RELEASE TRANSDERMAL
Status: DISCONTINUED | OUTPATIENT
Start: 2019-11-14 | End: 2019-11-14 | Stop reason: HOSPADM

## 2019-11-14 RX ORDER — MORPHINE SULFATE 2 MG/ML
2 INJECTION, SOLUTION INTRAMUSCULAR; INTRAVENOUS EVERY 5 MIN PRN
Status: DISCONTINUED | OUTPATIENT
Start: 2019-11-14 | End: 2019-11-14 | Stop reason: HOSPADM

## 2019-11-14 RX ORDER — LIDOCAINE HYDROCHLORIDE 20 MG/ML
INJECTION, SOLUTION INFILTRATION; PERINEURAL PRN
Status: DISCONTINUED | OUTPATIENT
Start: 2019-11-14 | End: 2019-11-14 | Stop reason: SDUPTHER

## 2019-11-14 RX ORDER — GLYCOPYRROLATE 1 MG/5 ML
SYRINGE (ML) INTRAVENOUS PRN
Status: DISCONTINUED | OUTPATIENT
Start: 2019-11-14 | End: 2019-11-14 | Stop reason: SDUPTHER

## 2019-11-14 RX ORDER — HYDROMORPHONE HYDROCHLORIDE 1 MG/ML
0.5 INJECTION, SOLUTION INTRAMUSCULAR; INTRAVENOUS; SUBCUTANEOUS EVERY 5 MIN PRN
Status: DISCONTINUED | OUTPATIENT
Start: 2019-11-14 | End: 2019-11-14 | Stop reason: HOSPADM

## 2019-11-14 RX ORDER — FENTANYL CITRATE 50 UG/ML
25 INJECTION, SOLUTION INTRAMUSCULAR; INTRAVENOUS EVERY 5 MIN PRN
Status: DISCONTINUED | OUTPATIENT
Start: 2019-11-14 | End: 2019-11-14 | Stop reason: HOSPADM

## 2019-11-14 RX ORDER — ONDANSETRON 2 MG/ML
INJECTION INTRAMUSCULAR; INTRAVENOUS PRN
Status: DISCONTINUED | OUTPATIENT
Start: 2019-11-14 | End: 2019-11-14 | Stop reason: SDUPTHER

## 2019-11-14 RX ORDER — DIPHENHYDRAMINE HYDROCHLORIDE 50 MG/ML
INJECTION INTRAMUSCULAR; INTRAVENOUS PRN
Status: DISCONTINUED | OUTPATIENT
Start: 2019-11-14 | End: 2019-11-14 | Stop reason: SDUPTHER

## 2019-11-14 RX ORDER — CEFAZOLIN SODIUM 2 G/50ML
2 SOLUTION INTRAVENOUS ONCE
Status: COMPLETED | OUTPATIENT
Start: 2019-11-14 | End: 2019-11-14

## 2019-11-14 RX ORDER — KETOROLAC TROMETHAMINE 30 MG/ML
INJECTION, SOLUTION INTRAMUSCULAR; INTRAVENOUS PRN
Status: DISCONTINUED | OUTPATIENT
Start: 2019-11-14 | End: 2019-11-14 | Stop reason: SDUPTHER

## 2019-11-14 RX ORDER — BUPIVACAINE HYDROCHLORIDE 2.5 MG/ML
INJECTION, SOLUTION EPIDURAL; INFILTRATION; INTRACAUDAL PRN
Status: DISCONTINUED | OUTPATIENT
Start: 2019-11-14 | End: 2019-11-14 | Stop reason: ALTCHOICE

## 2019-11-14 RX ORDER — FAMOTIDINE 20 MG/1
20 TABLET, FILM COATED ORAL ONCE
Status: COMPLETED | OUTPATIENT
Start: 2019-11-14 | End: 2019-11-14

## 2019-11-14 RX ORDER — HYDROCODONE BITARTRATE AND ACETAMINOPHEN 5; 325 MG/1; MG/1
2 TABLET ORAL PRN
Status: COMPLETED | OUTPATIENT
Start: 2019-11-14 | End: 2019-11-14

## 2019-11-14 RX ORDER — PROPOFOL 10 MG/ML
INJECTION, EMULSION INTRAVENOUS PRN
Status: DISCONTINUED | OUTPATIENT
Start: 2019-11-14 | End: 2019-11-14 | Stop reason: SDUPTHER

## 2019-11-14 RX ORDER — HYDROCODONE BITARTRATE AND ACETAMINOPHEN 5; 325 MG/1; MG/1
1 TABLET ORAL EVERY 6 HOURS PRN
Qty: 28 TABLET | Refills: 0 | Status: SHIPPED | OUTPATIENT
Start: 2019-11-14 | End: 2019-12-16 | Stop reason: SDUPTHER

## 2019-11-14 RX ORDER — FENTANYL CITRATE 50 UG/ML
INJECTION, SOLUTION INTRAMUSCULAR; INTRAVENOUS PRN
Status: DISCONTINUED | OUTPATIENT
Start: 2019-11-14 | End: 2019-11-14 | Stop reason: SDUPTHER

## 2019-11-14 RX ORDER — DEXAMETHASONE SODIUM PHOSPHATE 10 MG/ML
INJECTION, SOLUTION INTRAMUSCULAR; INTRAVENOUS PRN
Status: DISCONTINUED | OUTPATIENT
Start: 2019-11-14 | End: 2019-11-14 | Stop reason: SDUPTHER

## 2019-11-14 RX ORDER — HYDROCODONE BITARTRATE AND ACETAMINOPHEN 5; 325 MG/1; MG/1
1 TABLET ORAL PRN
Status: COMPLETED | OUTPATIENT
Start: 2019-11-14 | End: 2019-11-14

## 2019-11-14 RX ORDER — METOCLOPRAMIDE 10 MG/1
10 TABLET ORAL ONCE
Status: COMPLETED | OUTPATIENT
Start: 2019-11-14 | End: 2019-11-14

## 2019-11-14 RX ORDER — MIDAZOLAM HYDROCHLORIDE 1 MG/ML
INJECTION INTRAMUSCULAR; INTRAVENOUS PRN
Status: DISCONTINUED | OUTPATIENT
Start: 2019-11-14 | End: 2019-11-14 | Stop reason: SDUPTHER

## 2019-11-14 RX ADMIN — DEXAMETHASONE SODIUM PHOSPHATE 10 MG: 10 INJECTION, SOLUTION INTRAMUSCULAR; INTRAVENOUS at 13:26

## 2019-11-14 RX ADMIN — METOCLOPRAMIDE 10 MG: 10 TABLET ORAL at 12:10

## 2019-11-14 RX ADMIN — ONDANSETRON HYDROCHLORIDE 4 MG: 2 INJECTION, SOLUTION INTRAMUSCULAR; INTRAVENOUS at 13:28

## 2019-11-14 RX ADMIN — HYDROCODONE BITARTRATE AND ACETAMINOPHEN 1 TABLET: 5; 325 TABLET ORAL at 15:02

## 2019-11-14 RX ADMIN — LIDOCAINE HYDROCHLORIDE 50 MG: 20 INJECTION, SOLUTION INFILTRATION; PERINEURAL at 13:19

## 2019-11-14 RX ADMIN — MIDAZOLAM 2 MG: 1 INJECTION INTRAMUSCULAR; INTRAVENOUS at 13:17

## 2019-11-14 RX ADMIN — KETOROLAC TROMETHAMINE 30 MG: 30 INJECTION, SOLUTION INTRAMUSCULAR; INTRAVENOUS at 13:44

## 2019-11-14 RX ADMIN — FENTANYL CITRATE 100 MCG: 50 INJECTION, SOLUTION INTRAMUSCULAR; INTRAVENOUS at 13:19

## 2019-11-14 RX ADMIN — Medication 0.2 MG: at 13:19

## 2019-11-14 RX ADMIN — CEFAZOLIN SODIUM 2 G: 2 SOLUTION INTRAVENOUS at 13:15

## 2019-11-14 RX ADMIN — FAMOTIDINE 20 MG: 20 TABLET ORAL at 12:10

## 2019-11-14 RX ADMIN — SODIUM CHLORIDE, POTASSIUM CHLORIDE, SODIUM LACTATE AND CALCIUM CHLORIDE: 600; 310; 30; 20 INJECTION, SOLUTION INTRAVENOUS at 12:36

## 2019-11-14 RX ADMIN — DIPHENHYDRAMINE HYDROCHLORIDE 12.5 MG: 50 INJECTION, SOLUTION INTRAMUSCULAR; INTRAVENOUS at 13:44

## 2019-11-14 RX ADMIN — PROPOFOL 200 MG: 10 INJECTION, EMULSION INTRAVENOUS at 13:19

## 2019-11-14 ASSESSMENT — PULMONARY FUNCTION TESTS
PIF_VALUE: 12
PIF_VALUE: 13
PIF_VALUE: 12
PIF_VALUE: 21
PIF_VALUE: 12
PIF_VALUE: 13
PIF_VALUE: 12
PIF_VALUE: 12
PIF_VALUE: 1
PIF_VALUE: 3
PIF_VALUE: 12
PIF_VALUE: 13
PIF_VALUE: 12
PIF_VALUE: 1
PIF_VALUE: 12
PIF_VALUE: 12
PIF_VALUE: 21
PIF_VALUE: 12
PIF_VALUE: 26
PIF_VALUE: 12

## 2019-11-14 ASSESSMENT — PAIN DESCRIPTION - DESCRIPTORS
DESCRIPTORS: ACHING;DISCOMFORT
DESCRIPTORS: ACHING

## 2019-11-14 ASSESSMENT — PAIN SCALES - GENERAL
PAINLEVEL_OUTOF10: 2
PAINLEVEL_OUTOF10: 0
PAINLEVEL_OUTOF10: 3
PAINLEVEL_OUTOF10: 0
PAINLEVEL_OUTOF10: 0

## 2019-11-14 ASSESSMENT — PAIN DESCRIPTION - PAIN TYPE: TYPE: SURGICAL PAIN

## 2019-11-14 ASSESSMENT — PAIN - FUNCTIONAL ASSESSMENT: PAIN_FUNCTIONAL_ASSESSMENT: 0-10

## 2019-11-14 ASSESSMENT — LIFESTYLE VARIABLES: SMOKING_STATUS: 0

## 2019-11-14 ASSESSMENT — PAIN DESCRIPTION - LOCATION: LOCATION: KNEE

## 2019-11-14 ASSESSMENT — PAIN DESCRIPTION - ORIENTATION: ORIENTATION: RIGHT

## 2019-11-15 LAB — GRAM STAIN ORDERABLE: NORMAL

## 2019-11-19 LAB — ANAEROBIC CULTURE: NORMAL

## 2019-11-20 LAB — CULTURE SURGICAL: NORMAL

## 2019-12-03 ENCOUNTER — OFFICE VISIT (OUTPATIENT)
Dept: ORTHOPEDIC SURGERY | Age: 59
End: 2019-12-03

## 2019-12-03 VITALS — WEIGHT: 190 LBS | HEIGHT: 70 IN | BODY MASS INDEX: 27.2 KG/M2

## 2019-12-03 DIAGNOSIS — M17.11 PRIMARY OSTEOARTHRITIS OF RIGHT KNEE: Primary | ICD-10-CM

## 2019-12-03 PROCEDURE — 99024 POSTOP FOLLOW-UP VISIT: CPT | Performed by: ORTHOPAEDIC SURGERY

## 2019-12-16 DIAGNOSIS — S83.231A COMPLEX TEAR OF MEDIAL MENISCUS OF RIGHT KNEE AS CURRENT INJURY, INITIAL ENCOUNTER: ICD-10-CM

## 2019-12-16 RX ORDER — HYDROCODONE BITARTRATE AND ACETAMINOPHEN 5; 325 MG/1; MG/1
1 TABLET ORAL EVERY 6 HOURS PRN
Qty: 28 TABLET | Refills: 0 | Status: SHIPPED | OUTPATIENT
Start: 2019-12-16 | End: 2019-12-23

## 2019-12-31 ENCOUNTER — OFFICE VISIT (OUTPATIENT)
Dept: ORTHOPEDIC SURGERY | Age: 59
End: 2019-12-31
Payer: COMMERCIAL

## 2019-12-31 VITALS — BODY MASS INDEX: 26.6 KG/M2 | HEIGHT: 71 IN | TEMPERATURE: 98 F | WEIGHT: 190 LBS

## 2019-12-31 DIAGNOSIS — M17.11 PRIMARY OSTEOARTHRITIS OF RIGHT KNEE: Primary | ICD-10-CM

## 2019-12-31 PROCEDURE — 99024 POSTOP FOLLOW-UP VISIT: CPT | Performed by: ORTHOPAEDIC SURGERY

## 2019-12-31 PROCEDURE — 20610 DRAIN/INJ JOINT/BURSA W/O US: CPT | Performed by: ORTHOPAEDIC SURGERY

## 2019-12-31 RX ORDER — OLMESARTAN MEDOXOMIL 40 MG/1
40 TABLET ORAL DAILY
COMMUNITY
Start: 2019-11-15 | End: 2021-02-09

## 2019-12-31 RX ORDER — HYDROCHLOROTHIAZIDE 12.5 MG/1
TABLET ORAL
COMMUNITY
Start: 2019-11-15 | End: 2020-12-01 | Stop reason: SDUPTHER

## 2020-01-07 ENCOUNTER — OFFICE VISIT (OUTPATIENT)
Dept: CARDIOLOGY CLINIC | Age: 60
End: 2020-01-07
Payer: COMMERCIAL

## 2020-01-07 VITALS
HEIGHT: 70 IN | WEIGHT: 192 LBS | BODY MASS INDEX: 27.49 KG/M2 | SYSTOLIC BLOOD PRESSURE: 134 MMHG | HEART RATE: 68 BPM | DIASTOLIC BLOOD PRESSURE: 86 MMHG

## 2020-01-07 PROCEDURE — 93000 ELECTROCARDIOGRAM COMPLETE: CPT | Performed by: INTERNAL MEDICINE

## 2020-01-07 PROCEDURE — 99213 OFFICE O/P EST LOW 20 MIN: CPT | Performed by: INTERNAL MEDICINE

## 2020-01-07 NOTE — PROGRESS NOTES
CONSTITUTIONAL: Denies fevers, chills or night sweats. Denies fatigue. HEENT: Denies unusual headaches. Denies changes in hearing or vision. Denies dysphagia, hoarseness, hemoptysis, hematemesis, or epistaxis. He complains of recurrent sinus congestion. ENDOCRINE: He denies polyphagia, polyuria or polydipsia.  He denies heat or cold intolerance.    MUSCULOSKELETAL: He has right knee arthritis and related aches and pains, but his pain has been controlled since receiving an injection on 12/31/2019. SKIN: Denies unusual skin rashes, skin ulcers, or itching. HEME/LYMPH: Denies lymphadenopathy, bleeding or easy bruisability. HEART: As above. LUNGS: Denies significant cough or sputum production. GI: Denies abdominal pain, nausea, vomiting, diarrhea, constipation, rectal bleeding, or tarry stools. : Denies hematuria or dysuria. PSYCHIATRIC: Denies mood changes, anxiety or depression. NEUROLOGIC: Denies numbness, tingling or tremors. Denies any motor deficits. Denies memory problems.        CARDIOVASCULAR HISTORY:   1. Hypertension. 2. Hyperlipidemia. 3. Coronary artery disease. a. 1/26/11: Admitted with chest pain suggestive of unstable angina. b. 1/26/11: Treadmill nuclear stress test: Srinivasan protocol. 8 minutes and 30 seconds. 95% of maximum predicted heart rate. Physiologic blood pressure response. Patient developed chest pain at 7 ½ minutes into exercise. He did have around 2 mm ST segment depressions. Nuclear images were read as showing no evidence of stress-induced ischemia.  c. 1/27/11: Cardiac catheterization and angioplasty: Left main: No significant disease. LAD: 95% subtotal occlusion in the proximal third of the vessel. Intermediate ramus: No significant disease. LCX: No significant disease. RCA: No significant disease. Normal left ventricular size and systolic function. Elevated left ventricular end-diastolic pressure suggestive of diastolic dysfunction.  Mild peak-to-peak gradient across the aortic valve on pullback. Successful balloon angioplasty with deployment of a drug-eluting coronary stent to the LAD with excellent results with adjunctive Angiomax therapy. d. 1/3/2017: Treadmill nuclear stress test: Srinivasan protocol. 10 minutes.  101% of the maximum predicted heart rate.  Hypertensive blood pressure response.  No chest pain.  No ischemic EKG changes and no arrhythmias.  Nuclear images were within normal limits showing mild diaphragmatic attenuation artifact with no convincing evidence of any scars or any stress induced ischemia with the gated views showing no regional wall motion abnormality with a calculated ejection fraction of 76%.    e. Treadmill nuclear stress test done at [facility] done on 10/29/2019, at which time he reportedly achieved 100% of the maximum predicted heart rate, which showed no evidence of stress-induced ischemia with a calculated ejection fraction of 82%. 4. Carotid US, 02/11/2015. Mild plaque in both carotid bulbs and proximal internal carotid arteries suggestive of 1-15% stenosis. Normal bilateral common carotid arteries. Normal bilateral external carotid arteries. Antegrade flow in both vertebral arteries.      PAST MEDICAL HISTORY:  1. As under cardiovascular history. 2. Thrombocytopenia. 3. Hypothyroidism. 4. DJD. 5. Status post right knee arthroscopy in 2006. 6. Status post surgical repair of left hand fracture in 1978.  7. Status post repair of a torn right rotator cuff in 12/10.  8. Tubular adenoma on colonoscopy in 2007.  a. Repeat colonoscopy on 8/4/11 was normal.  b. Colonoscopy 12/21/2017: Small hyperplastic rectal polyps. One of them was sampled:  Reported negative pathology. Next examination in 10 years. 9. Gastritis on EGD in 2/10. 10. Alcohol abuse: Patient drinks 8-10 beers daily. 11. History of deviated nasal septum, S/P surgery in 12/2015.   12. Left arm biceps partial tear (after lifting/moving a heavy TV set) on MRI done on 2018. 13. Right knee arthroscopy with tri compartmental synovectomy, chondroplasty of MFC/patella/LFC on 2019. Right knee injection with Zilretta, 2019.       FAMILY HISTORY:  Mother living at age 80, has coronary artery disease and is status post stenting. She has hypertension and diabetes mellitus. Father  at age 76 from post-operative complications post abdominal aortic aneurysm repair: Had hypertension and hypothyroidism.      SOCIAL HISTORY:  Patient does not smoke. He drinks 8-10 beers a day. 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 Head & Neck:     Atraumatic, normocephalic head. No JVD. No carotid bruits. Carotid upstrokes normal bilaterally. No thyromegaly. Sclerae not icteric. No xanthelasmas. Mucous membranes moist.   Chest:                Symmetrical and nontender. No deformities. Lungs:                         Clear bilaterally. Heart:                          Normal S1 and S2. No S3 or S4. No murmurs or rubs. Abdomen:                Soft, nontender without organomegaly or masses. No bruits. Normal bowel sounds. Extremities:         No edema. Dorsalis pedis and posterior tibialis pulses normal bilaterally. Skin:                            Normal turgor. No rashes or ulcers detected. Neuro:                          Oriented x3. No motor or sensory deficit detected.           REVIEW OF DIAGNOSTIC TESTS:    1. Blood tests from 2019 reviewed:  BUN 12, creatinine 0.8, potassium 4.0, GFR > 60, CBC unremarkable. 2.  EKG done today showed sinus rhythm and was within normal limits. ASSESSMENT / DIAGNOSIS:   1. Coronary artery disease: Clinically stable.   2. Hypertension: Adequately controlled.     3. Hypercholesterolemia: On statin therapy. 4. Hypothyroidism: On replacement therapy.    5. DJD. 6. Alcohol abuse. 7. History of borderline elevated ALT. 8. Right knee arthritis. TREATMENT PLAN:  1. Reassure. 2.  Continue current medications. 3.  Patient advised to remain active. 4.  Follow up with Cardiology in 1 year or on a prn basis. Dakota Barclay.  [facility] [address]  [phone]

## 2020-01-24 ENCOUNTER — HOSPITAL ENCOUNTER (OUTPATIENT)
Age: 60
Discharge: HOME | End: 2020-01-26
Payer: COMMERCIAL

## 2020-01-24 LAB — PROSTATE SPECIFIC ANTIGEN: 5.62 NG/ML (ref 0–4)

## 2020-01-24 PROCEDURE — G0103 PSA SCREENING: HCPCS

## 2020-01-24 PROCEDURE — 36415 COLL VENOUS BLD VENIPUNCTURE: CPT

## 2020-01-28 ENCOUNTER — OFFICE VISIT (OUTPATIENT)
Dept: ORTHOPEDIC SURGERY | Age: 60
End: 2020-01-28

## 2020-01-28 VITALS — HEIGHT: 70 IN | WEIGHT: 192.02 LBS | BODY MASS INDEX: 27.49 KG/M2

## 2020-01-28 PROCEDURE — 99024 POSTOP FOLLOW-UP VISIT: CPT | Performed by: ORTHOPAEDIC SURGERY

## 2020-01-28 NOTE — PROGRESS NOTES
deformity noted. Upon palpation there is not tenderness. ROM: is  full and symmetrical.   Strength: Hip Flexors 5/5; Hip Abductors 5/5; Hip Adduction 5/5. Knee exam:  Right knee exam shows;  range of motion of R. Knee is 0 to 125, and L. Knee is 0 to 130. The patient does not have  pain on motion, effusion is none , there is no tenderness over the  medial region, there are not any masses, there is not ligamentous instability, there is deformity noted. Knee exam: right positive for moderate crepitations, some mild tenderness laxity is not noted with  stress. There is not a popliteal cyst.    R. Knee:  Lachman's negative, Anterior Drawer negative, Posterior Drawer negative  Bridger's positive, Thallasy  positive,   PF grind test negative, Apprehension test negative, Patellar J sign  negative  L. Knee:  Lachman's negative, Anterior Drawer negative, Posterior Drawer negative  Bridger's negative, Thallasy  negative,   PF grind test negative, Apprehension test negative,  Patellar J sign  negative    Xray Exam:  Not performed today. MRI:  1. Menisci and ligaments are intact. 2. Focal area of grade 3 chondrosis along the central portion of the   lateral tibial plateau. 3. Additional grade 3/4 chondrosis along the medial patellar facet and   medial trochlea with suggestion of a delamination type tear along the   medial patellar facet. 4. Moderate-sized joint effusion and small Baker's cyst.       Radiographic findings reviewed with patient    Assessment:  Encounter Diagnoses   Name Primary?  Primary osteoarthritis of right knee Yes       Plan:  I had a lengthy discussion with the patient regarding their diagnosis. I explained treatment options including surgical vs non surgical treatment. I reviewed in detail the risks and benefits and outlined the procedure in detail with expected outcomes and possible complications.   I also discussed non surgical treatment such as injections (CSI and visco supplementation), physical therapy, topical creams and NSAID's. They have elected for conservative management at this time.    Continue activity as tolerated  He will follow up as needed

## 2020-04-28 ENCOUNTER — HOSPITAL ENCOUNTER (OUTPATIENT)
Age: 60
Discharge: HOME | End: 2020-04-30
Payer: COMMERCIAL

## 2020-04-28 LAB — PROSTATE SPECIFIC ANTIGEN: 6.22 NG/ML (ref 0–4)

## 2020-04-28 PROCEDURE — 36415 COLL VENOUS BLD VENIPUNCTURE: CPT

## 2020-04-28 PROCEDURE — 84153 ASSAY OF PSA TOTAL: CPT

## 2020-05-05 ENCOUNTER — HOSPITAL ENCOUNTER (EMERGENCY)
Age: 60
Discharge: HOME | End: 2020-05-06
Attending: EMERGENCY MEDICINE
Payer: COMMERCIAL

## 2020-05-05 ENCOUNTER — NURSE TRIAGE (OUTPATIENT)
Dept: OTHER | Facility: CLINIC | Age: 60
End: 2020-05-05

## 2020-05-05 ENCOUNTER — APPOINTMENT (OUTPATIENT)
Dept: GENERAL RADIOLOGY | Age: 60
End: 2020-05-05
Payer: COMMERCIAL

## 2020-05-05 VITALS
OXYGEN SATURATION: 98 % | RESPIRATION RATE: 18 BRPM | BODY MASS INDEX: 27.2 KG/M2 | WEIGHT: 190 LBS | TEMPERATURE: 98.4 F | HEIGHT: 70 IN | HEART RATE: 61 BPM | SYSTOLIC BLOOD PRESSURE: 164 MMHG | DIASTOLIC BLOOD PRESSURE: 86 MMHG

## 2020-05-05 LAB
ALBUMIN SERPL-MCNC: 5.1 G/DL (ref 3.5–5.2)
ALP BLD-CCNC: 55 U/L (ref 40–129)
ALT SERPL-CCNC: 56 U/L (ref 0–40)
ANION GAP SERPL CALCULATED.3IONS-SCNC: 12 MMOL/L (ref 7–16)
AST SERPL-CCNC: 42 U/L (ref 0–39)
BASOPHILS ABSOLUTE: 0.03 E9/L (ref 0–0.2)
BASOPHILS RELATIVE PERCENT: 0.5 % (ref 0–2)
BILIRUB SERPL-MCNC: 0.5 MG/DL (ref 0–1.2)
BUN BLDV-MCNC: 15 MG/DL (ref 8–23)
CALCIUM SERPL-MCNC: 9.7 MG/DL (ref 8.6–10.2)
CHLORIDE BLD-SCNC: 99 MMOL/L (ref 98–107)
CO2: 28 MMOL/L (ref 22–29)
CREAT SERPL-MCNC: 0.8 MG/DL (ref 0.7–1.2)
EOSINOPHILS ABSOLUTE: 0.1 E9/L (ref 0.05–0.5)
EOSINOPHILS RELATIVE PERCENT: 1.8 % (ref 0–6)
GFR AFRICAN AMERICAN: >60
GFR NON-AFRICAN AMERICAN: >60 ML/MIN/1.73
GLUCOSE BLD-MCNC: 90 MG/DL (ref 74–99)
HCT VFR BLD CALC: 43.9 % (ref 37–54)
HEMOGLOBIN: 15.6 G/DL (ref 12.5–16.5)
IMMATURE GRANULOCYTES #: 0.01 E9/L
IMMATURE GRANULOCYTES %: 0.2 % (ref 0–5)
LYMPHOCYTES ABSOLUTE: 1.55 E9/L (ref 1.5–4)
LYMPHOCYTES RELATIVE PERCENT: 28.3 % (ref 20–42)
MCH RBC QN AUTO: 33.5 PG (ref 26–35)
MCHC RBC AUTO-ENTMCNC: 35.5 % (ref 32–34.5)
MCV RBC AUTO: 94.4 FL (ref 80–99.9)
MONOCYTES ABSOLUTE: 0.73 E9/L (ref 0.1–0.95)
MONOCYTES RELATIVE PERCENT: 13.3 % (ref 2–12)
NEUTROPHILS ABSOLUTE: 3.06 E9/L (ref 1.8–7.3)
NEUTROPHILS RELATIVE PERCENT: 55.9 % (ref 43–80)
PDW BLD-RTO: 12.4 FL (ref 11.5–15)
PLATELET # BLD: 121 E9/L (ref 130–450)
PMV BLD AUTO: 10.3 FL (ref 7–12)
POTASSIUM SERPL-SCNC: 3.7 MMOL/L (ref 3.5–5)
PRO-BNP: 43 PG/ML (ref 0–125)
RBC # BLD: 4.65 E12/L (ref 3.8–5.8)
SODIUM BLD-SCNC: 139 MMOL/L (ref 132–146)
TOTAL PROTEIN: 7.3 G/DL (ref 6.4–8.3)
TROPONIN: <0.01 NG/ML (ref 0–0.03)
WBC # BLD: 5.5 E9/L (ref 4.5–11.5)

## 2020-05-05 PROCEDURE — 83880 ASSAY OF NATRIURETIC PEPTIDE: CPT

## 2020-05-05 PROCEDURE — 94761 N-INVAS EAR/PLS OXIMETRY MLT: CPT

## 2020-05-05 PROCEDURE — 84484 ASSAY OF TROPONIN QUANT: CPT

## 2020-05-05 PROCEDURE — 93005 ELECTROCARDIOGRAM TRACING: CPT | Performed by: STUDENT IN AN ORGANIZED HEALTH CARE EDUCATION/TRAINING PROGRAM

## 2020-05-05 PROCEDURE — 71046 X-RAY EXAM CHEST 2 VIEWS: CPT

## 2020-05-05 PROCEDURE — 99284 EMERGENCY DEPT VISIT MOD MDM: CPT

## 2020-05-05 PROCEDURE — 80053 COMPREHEN METABOLIC PANEL: CPT

## 2020-05-05 PROCEDURE — 85025 COMPLETE CBC W/AUTO DIFF WBC: CPT

## 2020-05-05 ASSESSMENT — ENCOUNTER SYMPTOMS
BLURRED VISION: 0
NAUSEA: 0
COUGH: 0
SHORTNESS OF BREATH: 0
CHEST TIGHTNESS: 0
VOMITING: 0
PHOTOPHOBIA: 0
ABDOMINAL PAIN: 0

## 2020-05-06 ENCOUNTER — CARE COORDINATION (OUTPATIENT)
Dept: OTHER | Facility: CLINIC | Age: 60
End: 2020-05-06

## 2020-05-06 LAB
EKG ATRIAL RATE: 66 BPM
EKG P AXIS: 54 DEGREES
EKG P-R INTERVAL: 146 MS
EKG Q-T INTERVAL: 414 MS
EKG QRS DURATION: 104 MS
EKG QTC CALCULATION (BAZETT): 434 MS
EKG R AXIS: 29 DEGREES
EKG T AXIS: 51 DEGREES
EKG VENTRICULAR RATE: 66 BPM

## 2020-05-06 NOTE — ED PROVIDER NOTES
Gastrointestinal: Negative for abdominal pain, nausea and vomiting. Genitourinary: Negative for difficulty urinating, dysuria, flank pain, frequency and hematuria. Musculoskeletal: Negative for myalgias, neck pain and neck stiffness. Skin: Negative for pallor and rash. Neurological: Positive for headaches. Negative for dizziness, loss of consciousness, syncope, weakness and light-headedness. Psychiatric/Behavioral: Negative for confusion. Physical Exam  Vitals signs and nursing note reviewed. Constitutional:       General: He is not in acute distress. Appearance: Normal appearance. He is well-developed. He is not ill-appearing or diaphoretic. HENT:      Head: Normocephalic and atraumatic. Mouth/Throat:      Mouth: Mucous membranes are moist.   Eyes:      General:         Right eye: No discharge. Left eye: No discharge. Extraocular Movements: Extraocular movements intact. Conjunctiva/sclera: Conjunctivae normal.      Pupils: Pupils are equal, round, and reactive to light. Neck:      Musculoskeletal: Normal range of motion and neck supple. Cardiovascular:      Rate and Rhythm: Normal rate and regular rhythm. Pulses: Normal pulses. Heart sounds: Normal heart sounds. No murmur. Pulmonary:      Effort: Pulmonary effort is normal. No respiratory distress. Breath sounds: Normal breath sounds. No wheezing or rales. Abdominal:      General: Bowel sounds are normal.      Palpations: Abdomen is soft. Tenderness: There is no abdominal tenderness. There is no guarding or rebound. Musculoskeletal:         General: No swelling or tenderness. Skin:     General: Skin is warm and dry. Neurological:      Mental Status: He is alert and oriented to person, place, and time.           Procedures     MDM  Number of Diagnoses or Management Options  Uncontrolled hypertension:   Diagnosis management comments: Patient is a 79-year-old male that presents to the Prescriptions    No medications on file       Diagnosis:  1. Uncontrolled hypertension        Disposition:  Patient's disposition: Discharge to home  Patient's condition is stable. Costa Becerra.,   Resident  05/05/20 1373      ATTENDING PROVIDER ATTESTATION:     I have personally performed and/or participated in the history, exam, medical decision making, and procedures and agree with all pertinent clinical information. I have also reviewed and agree with the past medical, family and social history unless otherwise noted. I have discussed this patient in detail with the resident, and provided the instruction and education regarding uncontrolled hypertension and headaches. My findings/Plan: Heart RRR. Lungs CTA bilaterally. Abdomen soft, nontender. Bowel sounds normal. No meningeal signs. Supportive care. Discharge for outpatient follow up.          Sharlene Fagan DO  05/06/20 0004

## 2020-05-06 NOTE — DISCHARGE INSTR - COC
Continuity of Care Form    Patient Name: Bailee Mann   :  1960  MRN:  [id number]    Admit date:  2020  Discharge date:  ***    Code Status Order: Prior   Advance Directives:     Admitting Physician:  No admitting provider for patient encounter. PCP: Dianna Sy MD    Discharging Nurse: [facility] Unit/Room#:   Discharging Unit Phone Number: ***    Emergency Contact:   Extended Emergency Contact Information  Primary Emergency Contact: ClaytonShahrzad  Address: [address] [place] [address] Phone: [phone]  Mobile Phone: [phone]  Relation: Spouse   needed? No    Past Surgical History:  Past Surgical History:   Procedure Laterality Date    CARDIAC SURGERY  2011    PTCA with stent, LAD    COLONOSCOPY  11    normal    DIAGNOSTIC CARDIAC CATH LAB PROCEDURE  11    Normal LV size and systolic function. Elevated LVEDP suggestive of diastolic dysfunction. Mild peak-to-peak gradient across the AV on pullback. Successful balloon angioplasty with deployment of ROSE to LAD with excellent results with adjunctive Angiomax therapy     HAND SURGERY Left     fracture    KNEE ARTHROSCOPY Right     KNEE ARTHROSCOPY Right 2019    medial meniscectomy and debridement     KNEE ARTHROSCOPY Right 2019    RIGHT KNEE ARTHROSCOPY AND DEBRIDEMENT, CHONDROPLASTY AND SYNOVECTOMY performed by Fauzia Boswell DO at [address] Right 2010    SEPTOPLASTY  2015       Immunization History:   Immunization History   Administered Date(s) Administered    Td, unspecified formulation 2012       Active Problems:  Patient Active Problem List   Diagnosis Code    Glenoid labral tear S43.439A    Shoulder impingement M75.40    Rotator cuff tear M75.100    Blunt eye injury, bilateral S05. 8X1A, [id number]    Facial injury S09. 93XA    Hematoma of thigh S70.10XA    CAD (coronary SIGNATURE:  {[facility]:[id number]}

## 2020-05-13 VITALS
HEART RATE: 88 BPM | BODY MASS INDEX: 27.55 KG/M2 | WEIGHT: 192 LBS | SYSTOLIC BLOOD PRESSURE: 140 MMHG | RESPIRATION RATE: 12 BRPM | DIASTOLIC BLOOD PRESSURE: 98 MMHG

## 2020-05-13 RX ORDER — ATENOLOL 25 MG/1
25 TABLET ORAL DAILY
COMMUNITY
End: 2021-09-20 | Stop reason: SDUPTHER

## 2020-05-13 RX ORDER — OLMESARTAN MEDOXOMIL AND HYDROCHLOROTHIAZIDE 40/12.5 40; 12.5 MG/1; MG/1
1 TABLET ORAL DAILY
COMMUNITY
End: 2021-10-04 | Stop reason: SDUPTHER

## 2020-05-14 ENCOUNTER — NURSE ONLY (OUTPATIENT)
Dept: PRIMARY CARE CLINIC | Age: 60
End: 2020-05-14

## 2020-05-14 ENCOUNTER — HOSPITAL ENCOUNTER (OUTPATIENT)
Age: 60
Discharge: HOME | End: 2020-05-16
Payer: COMMERCIAL

## 2020-05-14 PROCEDURE — U0003 INFECTIOUS AGENT DETECTION BY NUCLEIC ACID (DNA OR RNA); SEVERE ACUTE RESPIRATORY SYNDROME CORONAVIRUS 2 (SARS-COV-2) (CORONAVIRUS DISEASE [COVID-19]), AMPLIFIED PROBE TECHNIQUE, MAKING USE OF HIGH THROUGHPUT TECHNOLOGIES AS DESCRIBED BY CMS-2020-01-R: HCPCS

## 2020-05-17 LAB — SARS-COV-2: NOT DETECTED

## 2020-05-18 ENCOUNTER — CARE COORDINATION (OUTPATIENT)
Dept: OTHER | Facility: CLINIC | Age: 60
End: 2020-05-18

## 2020-05-19 ENCOUNTER — NURSE ONLY (OUTPATIENT)
Dept: PRIMARY CARE CLINIC | Age: 60
End: 2020-05-19

## 2020-05-19 ENCOUNTER — HOSPITAL ENCOUNTER (OUTPATIENT)
Age: 60
Discharge: HOME | End: 2020-05-21
Payer: COMMERCIAL

## 2020-05-19 PROCEDURE — U0003 INFECTIOUS AGENT DETECTION BY NUCLEIC ACID (DNA OR RNA); SEVERE ACUTE RESPIRATORY SYNDROME CORONAVIRUS 2 (SARS-COV-2) (CORONAVIRUS DISEASE [COVID-19]), AMPLIFIED PROBE TECHNIQUE, MAKING USE OF HIGH THROUGHPUT TECHNOLOGIES AS DESCRIBED BY CMS-2020-01-R: HCPCS

## 2020-05-21 LAB — SARS-COV-2: NOT DETECTED

## 2020-05-27 ENCOUNTER — CARE COORDINATION (OUTPATIENT)
Dept: OTHER | Facility: CLINIC | Age: 60
End: 2020-05-27

## 2020-05-27 NOTE — LETTER
5/27/2020    [address] 84      Dear Chinyere Jay    My name is Lowery Oppenheim, RN , Associate Care Manager for [facility], and I have been trying to reach you. The Associate Care Management (ACM)  is a free-of-charge, confidential service provided to our employees and their family members covered by the [facility] [facility]. I can help you with care transitions such as when you come home from the hospital, when help is needed to manage your disease, or when you need assistance coordinating services or appointments. As healthcare providers, we know that patients do better when they have close follow up with a primary care provider (PCP). I can help you find one that is convenient to you and covered by your insurance. I can also help you understand any after visit instructions, such as what symptoms to watch out for, or any new or changed medications. We can work together using your preferred communication -- telephone, email, MyChart. If you do not have a [employer] account, I can help you request access. Our program is designed to provide you with the opportunity to have a [facility] partner with you for your healthcare needs. Due to not being able to reach you, I am closing out the current program, but will remain available to you should you have any questions. Please contact me at [phone]. In good health,    Lowery Oppenheim, RN  Associate Care Manager  [phone]  [email or web address]

## 2020-07-07 ENCOUNTER — HOSPITAL ENCOUNTER (OUTPATIENT)
Age: 60
Discharge: HOME | End: 2020-07-09
Payer: COMMERCIAL

## 2020-07-07 LAB — PROSTATE SPECIFIC ANTIGEN: 6.03 NG/ML (ref 0–4)

## 2020-07-07 PROCEDURE — 84153 ASSAY OF PSA TOTAL: CPT

## 2020-07-07 PROCEDURE — 36415 COLL VENOUS BLD VENIPUNCTURE: CPT

## 2020-09-19 ENCOUNTER — HOSPITAL ENCOUNTER (OUTPATIENT)
Age: 60
Discharge: HOME | End: 2020-09-19
Payer: COMMERCIAL

## 2020-09-19 LAB
ALBUMIN SERPL-MCNC: 4.9 G/DL (ref 3.5–5.2)
ALP BLD-CCNC: 58 U/L (ref 40–129)
ALT SERPL-CCNC: 44 U/L (ref 0–40)
ANION GAP SERPL CALCULATED.3IONS-SCNC: 13 MMOL/L (ref 7–16)
AST SERPL-CCNC: 38 U/L (ref 0–39)
BASOPHILS ABSOLUTE: 0.03 E9/L (ref 0–0.2)
BASOPHILS RELATIVE PERCENT: 0.5 % (ref 0–2)
BILIRUB SERPL-MCNC: 0.7 MG/DL (ref 0–1.2)
BUN BLDV-MCNC: 12 MG/DL (ref 8–23)
CALCIUM SERPL-MCNC: 9.5 MG/DL (ref 8.6–10.2)
CHLORIDE BLD-SCNC: 100 MMOL/L (ref 98–107)
CHOLESTEROL, FASTING: 196 MG/DL (ref 0–199)
CO2: 26 MMOL/L (ref 22–29)
CREAT SERPL-MCNC: 0.8 MG/DL (ref 0.7–1.2)
EOSINOPHILS ABSOLUTE: 0.15 E9/L (ref 0.05–0.5)
EOSINOPHILS RELATIVE PERCENT: 2.6 % (ref 0–6)
GFR AFRICAN AMERICAN: >60
GFR NON-AFRICAN AMERICAN: >60 ML/MIN/1.73
GLUCOSE FASTING: 90 MG/DL (ref 74–99)
HBA1C MFR BLD: 4.9 % (ref 4–5.6)
HCT VFR BLD CALC: 49 % (ref 37–54)
HDLC SERPL-MCNC: 59 MG/DL
HEMOGLOBIN: 16.8 G/DL (ref 12.5–16.5)
IMMATURE GRANULOCYTES #: 0.02 E9/L
IMMATURE GRANULOCYTES %: 0.3 % (ref 0–5)
LDL CHOLESTEROL CALCULATED: 114 MG/DL (ref 0–99)
LYMPHOCYTES ABSOLUTE: 1.6 E9/L (ref 1.5–4)
LYMPHOCYTES RELATIVE PERCENT: 27.6 % (ref 20–42)
MCH RBC QN AUTO: 32.8 PG (ref 26–35)
MCHC RBC AUTO-ENTMCNC: 34.3 % (ref 32–34.5)
MCV RBC AUTO: 95.7 FL (ref 80–99.9)
MONOCYTES ABSOLUTE: 0.68 E9/L (ref 0.1–0.95)
MONOCYTES RELATIVE PERCENT: 11.7 % (ref 2–12)
NEUTROPHILS ABSOLUTE: 3.31 E9/L (ref 1.8–7.3)
NEUTROPHILS RELATIVE PERCENT: 57.3 % (ref 43–80)
PDW BLD-RTO: 12.5 FL (ref 11.5–15)
PLATELET # BLD: 143 E9/L (ref 130–450)
PMV BLD AUTO: 10.7 FL (ref 7–12)
POTASSIUM SERPL-SCNC: 4.2 MMOL/L (ref 3.5–5)
RBC # BLD: 5.12 E12/L (ref 3.8–5.8)
SODIUM BLD-SCNC: 139 MMOL/L (ref 132–146)
T4 FREE: 1.33 NG/DL (ref 0.93–1.7)
TOTAL PROTEIN: 7.5 G/DL (ref 6.4–8.3)
TRIGLYCERIDE, FASTING: 113 MG/DL (ref 0–149)
TSH SERPL DL<=0.05 MIU/L-ACNC: 3.35 UIU/ML (ref 0.27–4.2)
VLDLC SERPL CALC-MCNC: 23 MG/DL
WBC # BLD: 5.8 E9/L (ref 4.5–11.5)

## 2020-09-19 PROCEDURE — 80061 LIPID PANEL: CPT

## 2020-09-19 PROCEDURE — 83036 HEMOGLOBIN GLYCOSYLATED A1C: CPT

## 2020-09-19 PROCEDURE — 84439 ASSAY OF FREE THYROXINE: CPT

## 2020-09-19 PROCEDURE — 80053 COMPREHEN METABOLIC PANEL: CPT

## 2020-09-19 PROCEDURE — 36415 COLL VENOUS BLD VENIPUNCTURE: CPT

## 2020-09-19 PROCEDURE — 84443 ASSAY THYROID STIM HORMONE: CPT

## 2020-09-19 PROCEDURE — 85025 COMPLETE CBC W/AUTO DIFF WBC: CPT

## 2020-09-23 VITALS
DIASTOLIC BLOOD PRESSURE: 80 MMHG | HEIGHT: 69 IN | SYSTOLIC BLOOD PRESSURE: 136 MMHG | HEART RATE: 84 BPM | RESPIRATION RATE: 14 BRPM | TEMPERATURE: 95.9 F | BODY MASS INDEX: 27.85 KG/M2 | WEIGHT: 188 LBS

## 2020-10-06 ENCOUNTER — OFFICE VISIT (OUTPATIENT)
Dept: ORTHOPEDIC SURGERY | Age: 60
End: 2020-10-06
Payer: COMMERCIAL

## 2020-10-06 VITALS — HEIGHT: 70 IN | TEMPERATURE: 98 F | WEIGHT: 188 LBS | BODY MASS INDEX: 26.92 KG/M2

## 2020-10-06 PROCEDURE — 20610 DRAIN/INJ JOINT/BURSA W/O US: CPT | Performed by: ORTHOPAEDIC SURGERY

## 2020-10-06 PROCEDURE — 99213 OFFICE O/P EST LOW 20 MIN: CPT | Performed by: ORTHOPAEDIC SURGERY

## 2020-10-06 RX ORDER — TRIAMCINOLONE ACETONIDE 40 MG/ML
40 INJECTION, SUSPENSION INTRA-ARTICULAR; INTRAMUSCULAR ONCE
Status: COMPLETED | OUTPATIENT
Start: 2020-10-06 | End: 2020-10-06

## 2020-10-06 RX ADMIN — TRIAMCINOLONE ACETONIDE 40 MG: 40 INJECTION, SUSPENSION INTRA-ARTICULAR; INTRAMUSCULAR at 15:27

## 2020-10-06 NOTE — PROGRESS NOTES
Chief Complaint   Patient presents with    Knee Pain     left knee pain last two weeks       Subjective:     Patient ID: Kylee Arndt is a 63 y.o..  male    Knee Pain  Patient complains of left knee pain. This is evaluated as a personal injury. There was not a history of injury. The pain began 2 weeks ago. The pain is located lateral, popliteral. He describes  His symptoms as aching. He has experienced popping, clicking, locking, and giving way in the affected knee. The patient has had pain with kneeling, squating, and climbing stairs. Symptoms improve with rest. The symptoms are worse with activity. The knee does not have given out or felt unstable. The patient can bend and straighten the knee fully. The patient is active in none. Treatment to date has been ice, heat, Tylenol, without significant relief. The patient is working. 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 Past Medical History:   Diagnosis Date    Alcohol abuse     drinks 8-10 beers daily    BPH with elevated PSA     CAD (coronary artery disease)     Chest pain 1/26/11    suggestive of unstable angina    DJD (degenerative joint disease)     right knee     Gastritis 2/10    on EGD    GERD (gastroesophageal reflux disease)     H/O exercise stress test 1/26/11    Treadmill nuclear stress test: Srinivasan protocol. 8 min, 30 sec. 95% max predicted heart rate. Physiologic BP response. Patient developed chest pain at 7 1/2 min into exercise. Had around 2 mm ST segment depressions. Nuclear images read as showing no evidence of stress-induced ischemia    H/O exercise stress test 9/9/11    Treadmill nuclear stress test: Srinivasan protocol. 9 min and 30 sec. 101% of max predicted heart rate. Hypertensive BP response. No CP. No ischemic EKG changes. Nuclear images showed soft tissue attenuation artifacts. There did not appear to be any evidence of stress-induced ischemia on study.  Gated views did not show any regional wall motion abnormality with possible hyperdynamic LVSF, EF 77%     Hx of cardiovascular stress test 10/29/2019    Nuclear treadmill stress test    Hyperlipidemia     Hypertension     Hypothyroidism     PONV (postoperative nausea and vomiting)     with anesthesia    Thrombocytopenia (HCC)     Thyroid disease      Past Surgical History:   Procedure Laterality Date    CARDIAC SURGERY  01/2011    PTCA with stent, LAD    COLONOSCOPY  8/4/11    normal    DIAGNOSTIC CARDIAC CATH LAB PROCEDURE  1/27/11    Normal LV size and systolic function. Elevated LVEDP suggestive of diastolic dysfunction. Mild peak-to-peak gradient across the AV on pullback. Successful balloon angioplasty with deployment of ROSE to LAD with excellent results with adjunctive Angiomax therapy     HAND SURGERY Left 1978    fracture    KNEE ARTHROSCOPY Right 2006    KNEE ARTHROSCOPY Right 11/14/2019    medial meniscectomy and debridement     KNEE ARTHROSCOPY Right 11/14/2019    RIGHT KNEE ARTHROSCOPY AND DEBRIDEMENT, CHONDROPLASTY AND SYNOVECTOMY performed by Lum Fothergill, DO at [address] Right 12/2010    SEPTOPLASTY  12/2015       Current Outpatient Medications:     olmesartan-hydrochlorothiazide (BENICAR HCT) 40-12.5 MG per tablet, Take 1 tablet by mouth daily, Disp: , Rfl:     atenolol (TENORMIN) 25 MG tablet, Take 25 mg by mouth daily, Disp: , Rfl:     hydrochlorothiazide (HYDRODIURIL) 12.5 MG tablet, , Disp: , Rfl:     olmesartan (BENICAR) 40 MG tablet, Take 40 mg by mouth daily , Disp: , Rfl:     amLODIPine (NORVASC) 5 MG tablet, Take 5 mg by mouth nightly , Disp: , Rfl:     Coenzyme Q10 (CO Q 10) 100 MG CAPS, Take by mouth daily , Disp: , Rfl:     aspirin 81 MG tablet, Take 81 mg by mouth daily , Disp: , Rfl:     Cholecalciferol (VITAMIN D) 2000 UNITS CAPS capsule, Take 1,000 Units by mouth daily , Disp: , Rfl:     Multiple Vitamins-Minerals (MULTIVITAL-M PO), Take  by mouth.  Woody smith, Disp: , Rfl:    rosuvastatin (CRESTOR) 20 MG tablet, Take 20 mg by mouth every other day.  , Disp: , Rfl:     levothyroxine (SYNTHROID) 50 MCG tablet, Take 50 mcg by mouth daily. , Disp: , Rfl:   No Known Allergies  Social History     Socioeconomic History    Marital status:      Spouse name: Not on file    Number of children: Not on file    Years of education: Not on file    Highest education level: Not on file   Occupational History    Occupation: RN   Social Needs    Financial resource strain: Not on file    Food insecurity     Worry: Not on file     Inability: Not on file   [employer] needs     Medical: Not on file     Non-medical: Not on file   Tobacco Use    Smoking status: Never Smoker    Smokeless tobacco: Never Used   Substance and Sexual Activity    Alcohol use: Yes     Comment: 6-8 beers daily.    1 1/2 daily of cups of coffee    Drug use: No    Sexual activity: Yes     Partners: Female   Lifestyle    Physical activity     Days per week: Not on file     Minutes per session: Not on file    Stress: Not on file   Relationships    Social connections     Talks on phone: Not on file     Gets together: Not on file     Attends Je[employer] witness service: Not on file     Active member of club or organization: Not on file     Attends meetings of clubs or organizations: Not on file     Relationship status: Not on file    Intimate partner violence     Fear of current or ex partner: Not on file     Emotionally abused: Not on file     Physically abused: Not on file     Forced sexual activity: Not on file   Other Topics Concern    Not on file   Social History Narrative    ** Merged History Encounter **          Family History   Problem Relation Age of Onset    Coronary Art Dis Mother         presently 80     Heart Surgery Mother     Hypertension Mother     Heart Surgery Father 76        Aortic aneurysm  post op     Hypertension Father     Thyroid Disease Father     Other Father         adbominal aortic extensor hallicus longus motor strength bilaterally. Sensory to both feet is intact to all sensory roots. Cardiovascular: The vascular exam is normal and is well perfused to distal extremities. Distal pulses DP/PT: R. 2+; L. 2+. There is cap refill noted less than two seconds in all digits. There is not edema of the bilateral lower extremities. There is not varicosities noted in the distal extremities. Lymph:  Upon palpation,  there is no lymphadenopathy noted in bilateral lower extremities. Musculoskeletal:  Gait: antalgic; examination of the nails and digits reveal no cyanosis or clubbing. Lumbar exam:  On visual inspection, there is not deformity of the spine. full range of motion, no tenderness, palpable spasm or pain on motion. Special tests: Straight Leg Raise negative, Julio test negative. Hip exam:   Upon inspection, there is not deformity noted. Upon palpation there is not tenderness. ROM: is  full and symmetrical.   Strength: Hip Flexors 5/5; Hip Abductors 5/5; Hip Adduction 5/5. Knee exam:  Left knee exam shows;  range of motion of R. Knee is 0 to 120, and L. Knee is 0 to 120. The patient does have  pain on motion, effusion is mild, there is tenderness over the  lateral, popliteral region, there are not any masses, there is not ligamentous instability, there is not  deformity noted. Knee exam: left positive for moderate crepitations, some mild tenderness laxity is not noted with  stress.   There is not a popliteal cyst.    R. Knee:  Lachman's negative, Anterior Drawer negative, Posterior Drawer negative  Bridger's negative, Thallasy  negative,   PF grind test negative, Apprehension test negative, Patellar J sign  negative  L. Knee:  Lachman's negative, Anterior Drawer negative, Posterior Drawer negative  Bridger's negative, Thallasy  negative,   PF grind test negative, Apprehension test negative,  Patellar J sign  negative    Xray Exam:  None today  Radiographic findings reviewed with patient    Assessment:  Encounter Diagnoses   Name Primary?  Primary osteoarthritis of left knee Yes       Plan:  Natural history and expected course discussed. Questions answered. Educational materials distributed. Rest, ice, compression, and elevation (RICE) therapy. Reduction in offending activity. I had a lengthy discussion with the patient regarding their diagnosis. I explained treatment options including surgical vs non surgical treatment. I reviewed in detail the risks and benefits and outlined the procedure in detail with expected outcomes and possible complications. I also discussed non surgical treatment such as injections (CSI and visco supplementation), physical therapy, topical creams and NSAID's. They have elected for conservative management at this time. The skin was prepped with alcohol and betadine. A 60 ml syringe with a 18 gauge needle was inserted into the on left knee joint space. I retained 10mL of fluid. I will proceed with a cortisone injection in the Left knee. Verbal and written consent was obtained for the injections. The skin was prepped with alcohol. 1mL of Kenalog 40mg and 9mL of 0.25% Marcaine was  injected to Left knee. The injection was given through the lateral side of the knee. The patient tolerated the injection well.  I will see the patient back prn

## 2020-12-01 NOTE — TELEPHONE ENCOUNTER
Patient called for refill.        Patient is requesting a refill of:  Medication: Hydrochlorothiazide   Dose: 12.5  Frequency: 1 daily   Pharmacy: [id number] [facility] [place] seen Visit date not found  Next appt Visit date not found

## 2020-12-02 RX ORDER — HYDROCHLOROTHIAZIDE 12.5 MG/1
12.5 TABLET ORAL DAILY
Qty: 90 TABLET | Refills: 1 | Status: SHIPPED
Start: 2020-12-02 | End: 2021-03-29

## 2021-01-05 ENCOUNTER — TELEPHONE (OUTPATIENT)
Dept: FAMILY MEDICINE CLINIC | Age: 61
End: 2021-01-05

## 2021-01-05 NOTE — TELEPHONE ENCOUNTER
Pt called he needs a exosome test. Dr. Bailey Lorenzo office not getting back to him and hes upset and asked if you can order it     He said you can do it on line AT [email or web address] [email or web address]     [address]

## 2021-01-05 NOTE — TELEPHONE ENCOUNTER
Spoke to patient to discuss with Dr. Mickey Su and follow with him patient has elevated PSA and he has seen the urologist

## 2021-02-09 ENCOUNTER — OFFICE VISIT (OUTPATIENT)
Dept: CARDIOLOGY CLINIC | Age: 61
End: 2021-02-09
Payer: COMMERCIAL

## 2021-02-09 VITALS
WEIGHT: 188 LBS | HEIGHT: 70 IN | HEART RATE: 79 BPM | DIASTOLIC BLOOD PRESSURE: 84 MMHG | BODY MASS INDEX: 26.92 KG/M2 | SYSTOLIC BLOOD PRESSURE: 138 MMHG

## 2021-02-09 DIAGNOSIS — E03.9 HYPOTHYROIDISM, UNSPECIFIED TYPE: ICD-10-CM

## 2021-02-09 DIAGNOSIS — F10.10 ALCOHOL ABUSE: ICD-10-CM

## 2021-02-09 DIAGNOSIS — R74.01 ELEVATED ALT MEASUREMENT: ICD-10-CM

## 2021-02-09 DIAGNOSIS — I10 ESSENTIAL HYPERTENSION: ICD-10-CM

## 2021-02-09 DIAGNOSIS — Z98.890 HX OF ARTHROSCOPY OF RIGHT KNEE: ICD-10-CM

## 2021-02-09 DIAGNOSIS — I25.10 CORONARY ARTERY DISEASE INVOLVING NATIVE CORONARY ARTERY OF NATIVE HEART WITHOUT ANGINA PECTORIS: Primary | ICD-10-CM

## 2021-02-09 DIAGNOSIS — Z98.61 HISTORY OF PTCA: ICD-10-CM

## 2021-02-09 DIAGNOSIS — Z79.899 ON STATIN THERAPY: ICD-10-CM

## 2021-02-09 DIAGNOSIS — E78.00 HYPERCHOLESTEREMIA: ICD-10-CM

## 2021-02-09 DIAGNOSIS — M15.9 PRIMARY OSTEOARTHRITIS INVOLVING MULTIPLE JOINTS: ICD-10-CM

## 2021-02-09 PROCEDURE — 93000 ELECTROCARDIOGRAM COMPLETE: CPT | Performed by: INTERNAL MEDICINE

## 2021-02-09 PROCEDURE — 99213 OFFICE O/P EST LOW 20 MIN: CPT | Performed by: INTERNAL MEDICINE

## 2021-02-09 RX ORDER — CLONIDINE HYDROCHLORIDE 0.1 MG/1
0.1 TABLET ORAL PRN
Qty: 30 TABLET | Refills: 3 | Status: SHIPPED
Start: 2021-02-09 | End: 2021-12-06 | Stop reason: SDUPTHER

## 2021-02-10 NOTE — PROGRESS NOTES
OFFICE VISIT        PRIMARY CARE PHYSICIAN:    Ady Booker MD       ALLERGIES / SENSITIVITIES:    No Known Allergies       REVIEWED MEDICATIONS:      Current Outpatient Medications:     cloNIDine (CATAPRES) 0.1 MG tablet, Take 1 tablet by mouth as needed for High Blood Pressure, Disp: 30 tablet, Rfl: 3    hydroCHLOROthiazide (HYDRODIURIL) 12.5 MG tablet, Take 1 tablet by mouth daily, Disp: 90 tablet, Rfl: 1    olmesartan-hydrochlorothiazide (BENICAR HCT) 40-12.5 MG per tablet, Take 1 tablet by mouth daily, Disp: , Rfl:     atenolol (TENORMIN) 25 MG tablet, Take 25 mg by mouth daily, Disp: , Rfl:     amLODIPine (NORVASC) 5 MG tablet, Take 5 mg by mouth nightly , Disp: , Rfl:     Coenzyme Q10 (CO Q 10) 100 MG CAPS, Take by mouth daily , Disp: , Rfl:     aspirin 81 MG tablet, Take 81 mg by mouth daily , Disp: , Rfl:     Cholecalciferol (VITAMIN D) 2000 UNITS CAPS capsule, Take 1,000 Units by mouth daily , Disp: , Rfl:     Multiple Vitamins-Minerals (MULTIVITAL-M PO), Take  by mouth. Woody smith, Disp: , Rfl:     rosuvastatin (CRESTOR) 20 MG tablet, Take 20 mg by mouth every other day.  , Disp: , Rfl:     levothyroxine (SYNTHROID) 50 MCG tablet, Take 50 mcg by mouth daily. , Disp: , Rfl:       S: REASON FOR VISIT: Coronary artery disease. Bill Guidry is a pleasant, 27-year-old gentleman with cardiovascular history as described below. He denies exertional chest pain or significant exertional dyspnea. He denies orthopnea, PND's or lower extremity swelling. He denies palpitations, dizziness, presyncope or syncope. He has been complaining of occasional right-sided chest pain, which is musculoskeletal.  He was in the Emergency Room in 5/2020 for elevated blood pressure and headache. He has followed up with his Primary Care Physician, Dr. Antoine Hamlin, and was advised to take an extra 2.5 mg of amlodipine, which he has not been taking consistently. Bill Guidry continues to drink alcohol heavily. He continues to exercise 3 times a week, again, without any chest pain or significant dyspnea during exercise. REVIEW OF SYSTEMS:    CONSTITUTIONAL: Denies fevers, chills or night sweats. Denies fatigue. HEENT: Denies unusual headaches. Denies changes in hearing or vision. Denies dysphagia, hoarseness, hemoptysis, hematemesis, or epistaxis. He complains of recurrent sinus congestion. ENDOCRINE: He denies polyphagia, polyuria or polydipsia.  He denies heat or cold intolerance.    MUSCULOSKELETAL: He has right knee arthritis and related aches and pains, but his pain has been controlled since receiving an injection on 12/31/2019. SKIN: Denies unusual skin rashes, skin ulcers, or itching. HEME/LYMPH: Denies lymphadenopathy, bleeding or easy bruisability. HEART: As above. LUNGS: Denies significant cough or sputum production. GI: Denies abdominal pain, nausea, vomiting, diarrhea, constipation, rectal bleeding, or tarry stools. : Denies hematuria or dysuria. PSYCHIATRIC: Denies mood changes, anxiety or depression. NEUROLOGIC: Denies numbness, tingling or tremors. Denies any motor deficits. Denies memory problems.        CARDIOVASCULAR HISTORY:   1. Hypertension. 2. Hyperlipidemia. 3. Coronary artery disease. a. 1/26/11: Admitted with chest pain suggestive of unstable angina. b. 1/26/11: Treadmill nuclear stress test: Srinivasan protocol. 8 minutes and 30 seconds. 95% of maximum predicted heart rate. Physiologic blood pressure response. Patient developed chest pain at 7 ½ minutes into exercise. He did have around 2 mm ST segment depressions. Nuclear images were read as showing no evidence of stress-induced ischemia.  c. 1/27/11: Cardiac catheterization and angioplasty: Left main: No significant disease. LAD: 95% subtotal occlusion in the proximal third of the vessel. Intermediate ramus: No significant disease. LCX: No significant disease. RCA: No significant disease. Normal left ventricular size and systolic function. Elevated left ventricular end-diastolic pressure suggestive of diastolic dysfunction. Mild peak-to-peak gradient across the aortic valve on pullback. Successful balloon angioplasty with deployment of a drug-eluting coronary stent to the LAD with excellent results with adjunctive Angiomax therapy. d. 1/3/2017: Treadmill nuclear stress test: Srinivasan protocol. 10 minutes.  101% of the maximum predicted heart rate.  Hypertensive blood pressure response.  No chest pain.  No ischemic EKG changes and no arrhythmias.  Nuclear images were within normal limits showing mild diaphragmatic attenuation artifact with no convincing evidence of any scars or any stress induced ischemia with the gated views showing no regional wall motion abnormality with a calculated ejection fraction of 76%.    e. Treadmill nuclear stress test done at [facility] done on 10/29/2019, at which time he reportedly achieved 100% of the maximum predicted heart rate, which showed no evidence of stress-induced ischemia with a calculated ejection fraction of 82%. 4. Carotid US, 2015. Mild plaque in both carotid bulbs and proximal internal carotid arteries suggestive of 1-15% stenosis. Normal bilateral common carotid arteries. Normal bilateral external carotid arteries. Antegrade flow in both vertebral arteries.      PAST MEDICAL HISTORY:  1. As under cardiovascular history. 2. Thrombocytopenia. 3. Hypothyroidism. 4. DJD. 5. Status post right knee arthroscopy in . 6. Status post surgical repair of left hand fracture in .  7. Status post repair of a torn right rotator cuff in 12/10.  8. Tubular adenoma on colonoscopy in .  a. Repeat colonoscopy on 11 was normal.  b. Colonoscopy 2017: Small hyperplastic rectal polyps.  One of them was sampled:  Reported negative pathology.  Next examination in 10 years.    9. Gastritis on EGD in 2/10. 10. Alcohol abuse: Patient drinks 8-10 beers daily. 11. History of deviated nasal septum, S/P surgery in 2015. 12. Left arm biceps partial tear (after lifting/moving a heavy TV set) on MRI done on 2018.    13. Right knee arthroscopy with tri compartmental synovectomy, chondroplasty of MFC/patella/LFC on 2019. Right knee injection with Zilretta, 2019.       FAMILY HISTORY:  Mother living in her mid to late [de-identified], has coronary artery disease and is status post stenting. She has hypertension and diabetes mellitus. Father  at age 76 from post-operative complications post abdominal aortic aneurysm repair: Had hypertension and hypothyroidism.      SOCIAL HISTORY:  Patient does not smoke. He drinks 8-10 beers a day. 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        O:  COMPLETE PHYSICAL EXAM:      /84 (Site: Right Upper Arm, Position: Sitting, Cuff Size: Medium Adult)   Pulse 79   Ht 5' 10\" (1.778 m)   Wt 188 lb (85.3 kg)   BMI 26.98 kg/m²      General:                       Well-developed, well-nourished male in no distress. Head & Neck:               Atraumatic, normocephalic head. No JVD. No carotid bruits. Carotid upstrokes normal bilaterally. No thyromegaly. Sclerae not icteric. No xanthelasmas. Mucous membranes moist.   Chest:                          Symmetrical and nontender. No deformities. Lungs:                         Clear bilaterally. Heart:                          Normal S1 and S2. No S3 or S4. No murmurs or rubs. Abdomen:                    Soft, nontender without organomegaly or masses. No bruits. Normal bowel sounds. Extremities:                 No edema. Dorsalis pedis and posterior tibialis pulses normal bilaterally. Skin:                            Normal turgor. No rashes or ulcers detected. Neuro:                          Oriented x3. No motor or sensory deficit detected.            REVIEW OF DIAGNOSTIC TESTS:    1. Blood tests from 9/19/2020 reviewed:  BUN 12, creatinine 0.8, GFR > 60, potassium 4.2, sodium 139, ALT 44, otherwise, LFT's unremarkable. CBC unremarkable. Total cholesterol 196, triglycerides 113, HDL 59, . TSH and free T4 normal. Hemoglobin A1C 4.9.  2. EKG done today showed sinus rhythm with low QRS voltages in the limb leads, but was otherwise unremarkable. ASSESSMENT / DIAGNOSIS:   1. Coronary artery disease: Clinically stable. 2. Hypertension: Adequately controlled on today's measurement. 3. Hypercholesterolemia: On statin therapy. 4. Hypothyroidism: On replacement therapy.    5. DJD. 6. Alcohol abuse. 7. Borderline elevated ALT. 8. Right knee arthritis.          TREATMENT PLAN:  1. Reassure. 2.  Continue current medications. Clonidine 0.1 mg to be taken on a prn basis for systolic pressure > 452 given. 3.  Patient advised to remain active. 4.  Patient advised to cut down on his alcohol consumption. 5.  Follow up with cardiology in 1 year or on a prn basis.          [facility] Castillo [address]  [facility] [address]  [phone]

## 2021-03-29 ENCOUNTER — OFFICE VISIT (OUTPATIENT)
Dept: FAMILY MEDICINE CLINIC | Age: 61
End: 2021-03-29
Payer: COMMERCIAL

## 2021-03-29 VITALS
WEIGHT: 196 LBS | HEIGHT: 69 IN | OXYGEN SATURATION: 99 % | SYSTOLIC BLOOD PRESSURE: 150 MMHG | BODY MASS INDEX: 29.03 KG/M2 | DIASTOLIC BLOOD PRESSURE: 84 MMHG | HEART RATE: 60 BPM

## 2021-03-29 DIAGNOSIS — D69.6 THROMBOCYTOPENIA (HCC): ICD-10-CM

## 2021-03-29 DIAGNOSIS — I10 ESSENTIAL HYPERTENSION: Primary | ICD-10-CM

## 2021-03-29 DIAGNOSIS — E03.9 HYPOTHYROIDISM, UNSPECIFIED TYPE: ICD-10-CM

## 2021-03-29 DIAGNOSIS — E78.5 HYPERLIPIDEMIA, UNSPECIFIED HYPERLIPIDEMIA TYPE: ICD-10-CM

## 2021-03-29 DIAGNOSIS — R74.8 ELEVATED LIVER ENZYMES: ICD-10-CM

## 2021-03-29 DIAGNOSIS — R97.20 ELEVATED PSA: ICD-10-CM

## 2021-03-29 DIAGNOSIS — R73.9 HYPERGLYCEMIA: ICD-10-CM

## 2021-03-29 DIAGNOSIS — I25.10 CORONARY ARTERY DISEASE INVOLVING NATIVE CORONARY ARTERY OF NATIVE HEART WITHOUT ANGINA PECTORIS: ICD-10-CM

## 2021-03-29 PROCEDURE — 99213 OFFICE O/P EST LOW 20 MIN: CPT | Performed by: INTERNAL MEDICINE

## 2021-03-29 RX ORDER — ROSUVASTATIN CALCIUM 20 MG/1
20 TABLET, COATED ORAL EVERY OTHER DAY
Qty: 90 TABLET | Refills: 1 | Status: SHIPPED
Start: 2021-03-29 | End: 2021-12-06 | Stop reason: SDUPTHER

## 2021-03-29 ASSESSMENT — PATIENT HEALTH QUESTIONNAIRE - PHQ9
SUM OF ALL RESPONSES TO PHQ QUESTIONS 1-9: 0
SUM OF ALL RESPONSES TO PHQ9 QUESTIONS 1 & 2: 0
1. LITTLE INTEREST OR PLEASURE IN DOING THINGS: 0
SUM OF ALL RESPONSES TO PHQ QUESTIONS 1-9: 0

## 2021-03-29 NOTE — PROGRESS NOTES
Subjective:     Chief Complaint   Patient presents with    Hypertension   Follow-up on hypertension from the cholesterol  He was seen by cardiologist Dr. Nahomi Martin in February 2021 everything checked out okay his blood pressure there was 138/84  He has been monitoring her blood pressure at home  The last one was 139/76    He has not increased his amlodipine difficult to break the tablet  But his blood pressure reading at home has been good    He was given prescription for clonidine 0.1 mg by the cardiologist to be used as needed basis      He is still drinking alcohol 5-6 beers a day  Other hard liquor  Physically very active exercises a lot    Denies any chest pain or angina    He had elevated liver enzymes  And low platelet count denies any active bleeding        Past Medical History:   Diagnosis Date    Alcohol abuse     drinks 8-10 beers daily    BPH with elevated PSA     CAD (coronary artery disease)     Chest pain 1/26/11    suggestive of unstable angina    DJD (degenerative joint disease)     right knee     Gastritis 2/10    on EGD    GERD (gastroesophageal reflux disease)     H/O exercise stress test 1/26/11    Treadmill nuclear stress test: Srinivasan protocol. 8 min, 30 sec. 95% max predicted heart rate. Physiologic BP response. Patient developed chest pain at 7 1/2 min into exercise. Had around 2 mm ST segment depressions. Nuclear images read as showing no evidence of stress-induced ischemia    H/O exercise stress test 9/9/11    Treadmill nuclear stress test: Srinivasan protocol. 9 min and 30 sec. 101% of max predicted heart rate. Hypertensive BP response. No CP. No ischemic EKG changes. Nuclear images showed soft tissue attenuation artifacts. There did not appear to be any evidence of stress-induced ischemia on study.  Gated views did not show any regional wall motion abnormality with possible hyperdynamic LVSF, EF 77%     Hx of cardiovascular stress test 10/29/2019    Nuclear treadmill stress test    Hyperlipidemia     Hypertension     Hypothyroidism     PONV (postoperative nausea and vomiting)     with anesthesia    Thrombocytopenia (HCC)     Thyroid disease         Social History     Socioeconomic History    Marital status:      Spouse name: Not on file    Number of children: Not on file    Years of education: Not on file    Highest education level: Not on file   Occupational History    Occupation: RN   Social Needs    Financial resource strain: Not on file    Food insecurity     Worry: Not on file     Inability: Not on file   [employer] needs     Medical: Not on file     Non-medical: Not on file   Tobacco Use    Smoking status: Never Smoker    Smokeless tobacco: Never Used   Substance and Sexual Activity    Alcohol use: Yes     Comment: 6-8 beers daily. 1 1/2 daily of cups of coffee    Drug use: No    Sexual activity: Yes     Partners: Female   Lifestyle    Physical activity     Days per week: Not on file     Minutes per session: Not on file    Stress: Not on file   Relationships    Social connections     Talks on phone: Not on file     Gets together: Not on file     Attends [employer] service: Not on file     Active member of club or organization: Not on file     Attends meetings of clubs or organizations: Not on file     Relationship status: Not on file    Intimate partner violence     Fear of current or ex partner: Not on file     Emotionally abused: Not on file     Physically abused: Not on file     Forced sexual activity: Not on file   Other Topics Concern    Not on file   Social History Narrative    ** Merged History Encounter **             Past Surgical History:   Procedure Laterality Date    CARDIAC SURGERY  01/2011    PTCA with stent, LAD    COLONOSCOPY  8/4/11    normal    DIAGNOSTIC CARDIAC CATH LAB PROCEDURE  1/27/11    Normal LV size and systolic function. Elevated LVEDP suggestive of diastolic dysfunction.  Mild peak-to-peak gradient across the AV on pullback. Successful balloon angioplasty with deployment of ROSE to LAD with excellent results with adjunctive Angiomax therapy     HAND SURGERY Left 1978    fracture    KNEE ARTHROSCOPY Right     KNEE ARTHROSCOPY Right 2019    medial meniscectomy and debridement     KNEE ARTHROSCOPY Right 2019    RIGHT KNEE ARTHROSCOPY AND DEBRIDEMENT, CHONDROPLASTY AND SYNOVECTOMY performed by Lisa Green DO at [address] Right 2010    SEPTOPLASTY  2015        Family History   Problem Relation Age of Onset    Coronary Art Dis Mother         presently 80     Heart Surgery Mother     Hypertension Mother     Heart Surgery Father 76        Aortic aneurysm  post op    Dash Hypertension Father     Thyroid Disease Father     Other Father         adbominal aortic aneurysm     Hypertension Sister     Hypertension Brother     Hypertension Brother         No Known Allergies     ROS  No acute distress  Cardiac: Denies any chest pain or palpitation CAD has been stable  Respiratory: Denies any cough or shortness of breath  GI: No abdominal pain. Denies any nausea vomiting or diarrhea  Colonoscopy  with Dr. Rehana Nava  : Denies any dysuria frequency or hematuria  Elevated PSA being followed by urologist  Neuro: No headache or dizziness  Endocrine: No diabetes  Skin: normal  No recent weight gain or weight loss  Denies any change in vision    Objective:    BP (!) 150/84   Pulse 60   Ht 5' 9\" (1.753 m)   Wt 196 lb (88.9 kg)   SpO2 99%   BMI 28.94 kg/m²     Constitutional: Alert awake and oriented  Eyes: Pupils equal bilaterally. Extraocular muscles intact  Neck: no JVD adenopathy no bruit  Heart:  RRR, no murmurs, gallops, or rubs.   Lungs:    no wheeze, rales or rhonchi  Abd: bowel sounds present, nontender, nondistended, no masses no organomegaly  Extrem:  No clubbing, cyanosis, or edema  Neuro: AAOx3,No Focal deficit  Psychological: no depression or anxiety Current Outpatient Medications   Medication Sig Dispense Refill    rosuvastatin (CRESTOR) 20 MG tablet Take 1 tablet by mouth every other day 90 tablet 1    cloNIDine (CATAPRES) 0.1 MG tablet Take 1 tablet by mouth as needed for High Blood Pressure 30 tablet 3    olmesartan-hydrochlorothiazide (BENICAR HCT) 40-12.5 MG per tablet Take 1 tablet by mouth daily      atenolol (TENORMIN) 25 MG tablet Take 25 mg by mouth daily      amLODIPine (NORVASC) 5 MG tablet Take 5 mg by mouth nightly       Coenzyme Q10 (CO Q 10) 100 MG CAPS Take by mouth daily       aspirin 81 MG tablet Take 81 mg by mouth daily       Cholecalciferol (VITAMIN D) 2000 UNITS CAPS capsule Take 1,000 Units by mouth daily       Multiple Vitamins-Minerals (MULTIVITAL-M PO) Take  by mouth. Shaklee vits      levothyroxine (SYNTHROID) 50 MCG tablet Take 50 mcg by mouth daily. No current facility-administered medications for this visit.          Last 3 BMP  Lab Results   Component Value Date/Time     09/19/2020 08:40 AM     05/05/2020 10:59 PM     11/12/2019 09:25 AM    K 4.2 09/19/2020 08:40 AM    K 3.7 05/05/2020 10:59 PM    K 4.0 11/12/2019 09:25 AM     09/19/2020 08:40 AM    CL 99 05/05/2020 10:59 PM     11/12/2019 09:25 AM    CO2 26 09/19/2020 08:40 AM    CO2 28 05/05/2020 10:59 PM    CO2 23 11/12/2019 09:25 AM    BUN 12 09/19/2020 08:40 AM    BUN 15 05/05/2020 10:59 PM    BUN 12 11/12/2019 09:25 AM    CREATININE 0.8 09/19/2020 08:40 AM    CREATININE 0.8 05/05/2020 10:59 PM    CREATININE 0.8 11/12/2019 09:25 AM    GLUCOSE 90 05/05/2020 10:59 PM    GLUCOSE 104 (H) 11/12/2019 09:25 AM    GLUCOSE 89 05/03/2019 09:54 AM    GLUCOSE 82 08/04/2011 06:30 AM    GLUCOSE 82 02/14/2011 09:07 AM    GLUCOSE 95 01/28/2011 03:35 AM    CALCIUM 9.5 09/19/2020 08:40 AM    CALCIUM 9.7 05/05/2020 10:59 PM    CALCIUM 9.6 11/12/2019 09:25 AM       Last 3 CMP:    Lab Results   Component Value Date/Time     09/19/2020 08:40 AM  05/05/2020 10:59 PM     11/12/2019 09:25 AM    K 4.2 09/19/2020 08:40 AM    K 3.7 05/05/2020 10:59 PM    K 4.0 11/12/2019 09:25 AM     09/19/2020 08:40 AM    CL 99 05/05/2020 10:59 PM     11/12/2019 09:25 AM    CO2 26 09/19/2020 08:40 AM    CO2 28 05/05/2020 10:59 PM    CO2 23 11/12/2019 09:25 AM    BUN 12 09/19/2020 08:40 AM    BUN 15 05/05/2020 10:59 PM    BUN 12 11/12/2019 09:25 AM    CREATININE 0.8 09/19/2020 08:40 AM    CREATININE 0.8 05/05/2020 10:59 PM    CREATININE 0.8 11/12/2019 09:25 AM    GLUCOSE 90 05/05/2020 10:59 PM    GLUCOSE 104 (H) 11/12/2019 09:25 AM    GLUCOSE 89 05/03/2019 09:54 AM    GLUCOSE 82 08/04/2011 06:30 AM    GLUCOSE 82 02/14/2011 09:07 AM    GLUCOSE 95 01/28/2011 03:35 AM    CALCIUM 9.5 09/19/2020 08:40 AM    CALCIUM 9.7 05/05/2020 10:59 PM    CALCIUM 9.6 11/12/2019 09:25 AM    PROT 7.5 09/19/2020 08:40 AM    PROT 7.3 05/05/2020 10:59 PM    PROT 6.9 05/03/2019 09:54 AM    LABALBU 4.9 09/19/2020 08:40 AM    LABALBU 5.1 05/05/2020 10:59 PM    LABALBU 4.6 05/03/2019 09:54 AM    LABALBU 4.5 08/04/2011 06:30 AM    LABALBU 4.6 03/14/2011 09:55 AM    LABALBU 4.6 02/14/2011 09:07 AM    BILITOT 0.7 09/19/2020 08:40 AM    BILITOT 0.5 05/05/2020 10:59 PM    BILITOT 0.6 05/03/2019 09:54 AM    ALKPHOS 58 09/19/2020 08:40 AM    ALKPHOS 55 05/05/2020 10:59 PM    ALKPHOS 66 05/03/2019 09:54 AM    AST 38 09/19/2020 08:40 AM    AST 42 (H) 05/05/2020 10:59 PM    AST 29 05/03/2019 09:54 AM    ALT 44 (H) 09/19/2020 08:40 AM    ALT 56 (H) 05/05/2020 10:59 PM    ALT 37 05/03/2019 09:54 AM        CBC:   Lab Results   Component Value Date/Time    WBC 5.8 09/19/2020 08:40 AM    RBC 5.12 09/19/2020 08:40 AM    HGB 16.8 (H) 09/19/2020 08:40 AM    HCT 49.0 09/19/2020 08:40 AM    MCV 95.7 09/19/2020 08:40 AM    MCH 32.8 09/19/2020 08:40 AM    MCHC 34.3 09/19/2020 08:40 AM    RDW 12.5 09/19/2020 08:40 AM     09/19/2020 08:40 AM    MPV 10.7 09/19/2020 08:40 AM       A1C:  Lab Results Component Value Date/Time    LABA1C 4.9 09/19/2020 08:40 AM       Lipid panel:  Lab Results   Component Value Date    CHOL 179 05/03/2019    CHOL 179 04/19/2016    CHOL 192 12/07/2015    TRIG 71 05/03/2019    TRIG 124 04/19/2016    TRIG 73 12/07/2015    HDL 59 09/19/2020    HDL 58 05/03/2019    HDL 61 12/21/2017        Lab Results   Component Value Date/Time    PROT 7.5 09/19/2020 08:40 AM    PROT 7.3 05/05/2020 10:59 PM    PROT 6.9 05/03/2019 09:54 AM    INR 0.9 06/17/2012 03:15 AM    INR 1.4 01/27/2011 03:00 AM       Lab Results   Component Value Date/Time    MG 2.2 01/27/2011 03:00 AM         Assessment. Wilton Skinner was seen today for hypertension. Diagnoses and all orders for this visit:    Essential hypertension    Elevated liver enzymes  -     US LIVER SPLEEN; Future  -     US GALLBLADDER RUQ; Future  -     Comprehensive Metabolic Panel; Future  -     Lipid Panel; Future    Thrombocytopenia (HCC)  -     US LIVER SPLEEN; Future  -     CBC Auto Differential; Future    Hyperlipidemia, unspecified hyperlipidemia type    Hyperglycemia  -     Hemoglobin A1C; Future    Elevated PSA  -     PSA, DIAGNOSTIC; Future  -     PSA, TOTAL AND FREE; Future    Coronary artery disease involving native coronary artery of native heart without angina pectoris    Hypothyroidism, unspecified type  -     TSH without Reflex; Future  -     T4, FREE; Future    Other orders  -     rosuvastatin (CRESTOR) 20 MG tablet;  Take 1 tablet by mouth every other day       Patient Active Problem List   Diagnosis    Glenoid labral tear    Shoulder impingement    Rotator cuff tear    Blunt eye injury, bilateral    Facial injury    Hematoma of thigh    CAD (coronary artery disease)    HTN (hypertension)    History of PTCA    Hyperlipidemia    Hypothyroidism    DJD (degenerative joint disease)    ETOH abuse    Tubular adenoma of colon    H/O gastritis    Deviated septum    Nasal turbinate hypertrophy    Nasal congestion    Chronic

## 2021-04-01 DIAGNOSIS — R97.20 ELEVATED PSA: ICD-10-CM

## 2021-04-01 DIAGNOSIS — D69.6 THROMBOCYTOPENIA (HCC): ICD-10-CM

## 2021-04-01 DIAGNOSIS — R74.8 ELEVATED LIVER ENZYMES: ICD-10-CM

## 2021-04-01 DIAGNOSIS — E03.9 HYPOTHYROIDISM, UNSPECIFIED TYPE: ICD-10-CM

## 2021-04-01 DIAGNOSIS — R73.9 HYPERGLYCEMIA: ICD-10-CM

## 2021-04-01 LAB
ALBUMIN SERPL-MCNC: 5 G/DL (ref 3.5–5.2)
ALP BLD-CCNC: 54 U/L (ref 40–129)
ALT SERPL-CCNC: 74 U/L (ref 0–40)
ANION GAP SERPL CALCULATED.3IONS-SCNC: 16 MMOL/L (ref 7–16)
AST SERPL-CCNC: 55 U/L (ref 0–39)
BASOPHILS ABSOLUTE: 0.03 E9/L (ref 0–0.2)
BASOPHILS RELATIVE PERCENT: 0.5 % (ref 0–2)
BILIRUB SERPL-MCNC: 0.6 MG/DL (ref 0–1.2)
BUN BLDV-MCNC: 10 MG/DL (ref 8–23)
CALCIUM SERPL-MCNC: 9.5 MG/DL (ref 8.6–10.2)
CHLORIDE BLD-SCNC: 99 MMOL/L (ref 98–107)
CHOLESTEROL, TOTAL: 206 MG/DL (ref 0–199)
CO2: 24 MMOL/L (ref 22–29)
CREAT SERPL-MCNC: 0.8 MG/DL (ref 0.7–1.2)
EOSINOPHILS ABSOLUTE: 0.16 E9/L (ref 0.05–0.5)
EOSINOPHILS RELATIVE PERCENT: 2.9 % (ref 0–6)
GFR AFRICAN AMERICAN: >60
GFR NON-AFRICAN AMERICAN: >60 ML/MIN/1.73
GLUCOSE BLD-MCNC: 83 MG/DL (ref 74–99)
HBA1C MFR BLD: 4.9 % (ref 4–5.6)
HCT VFR BLD CALC: 48.2 % (ref 37–54)
HDLC SERPL-MCNC: 58 MG/DL
HEMOGLOBIN: 16.2 G/DL (ref 12.5–16.5)
IMMATURE GRANULOCYTES #: 0.02 E9/L
IMMATURE GRANULOCYTES %: 0.4 % (ref 0–5)
LDL CHOLESTEROL CALCULATED: 116 MG/DL (ref 0–99)
LYMPHOCYTES ABSOLUTE: 1.72 E9/L (ref 1.5–4)
LYMPHOCYTES RELATIVE PERCENT: 30.8 % (ref 20–42)
MCH RBC QN AUTO: 32.5 PG (ref 26–35)
MCHC RBC AUTO-ENTMCNC: 33.6 % (ref 32–34.5)
MCV RBC AUTO: 96.6 FL (ref 80–99.9)
MONOCYTES ABSOLUTE: 0.73 E9/L (ref 0.1–0.95)
MONOCYTES RELATIVE PERCENT: 13.1 % (ref 2–12)
NEUTROPHILS ABSOLUTE: 2.93 E9/L (ref 1.8–7.3)
NEUTROPHILS RELATIVE PERCENT: 52.3 % (ref 43–80)
PDW BLD-RTO: 13.2 FL (ref 11.5–15)
PLATELET # BLD: 130 E9/L (ref 130–450)
PMV BLD AUTO: 10.8 FL (ref 7–12)
POTASSIUM SERPL-SCNC: 4.3 MMOL/L (ref 3.5–5)
PROSTATE SPECIFIC ANTIGEN: 7.22 NG/ML (ref 0–4)
RBC # BLD: 4.99 E12/L (ref 3.8–5.8)
SODIUM BLD-SCNC: 139 MMOL/L (ref 132–146)
T4 FREE: 1.32 NG/DL (ref 0.93–1.7)
TOTAL PROTEIN: 7.5 G/DL (ref 6.4–8.3)
TRIGL SERPL-MCNC: 162 MG/DL (ref 0–149)
VLDLC SERPL CALC-MCNC: 32 MG/DL
WBC # BLD: 5.6 E9/L (ref 4.5–11.5)

## 2021-04-02 LAB — TSH SERPL DL<=0.05 MIU/L-ACNC: 3.66 UIU/ML (ref 0.27–4.2)

## 2021-04-03 LAB
PROSTATE SPECIFIC ANTIGEN FREE: 1.3 NG/ML
PROSTATE SPECIFIC ANTIGEN PERCENT FREE: 18 %
PROSTATE SPECIFIC ANTIGEN: 7.2 NG/ML (ref 0–4)

## 2021-04-29 ENCOUNTER — TELEPHONE (OUTPATIENT)
Dept: CARDIOLOGY CLINIC | Age: 61
End: 2021-04-29

## 2021-05-06 ENCOUNTER — HOSPITAL ENCOUNTER (OUTPATIENT)
Age: 61
Discharge: HOME | End: 2021-05-08

## 2021-05-06 PROCEDURE — 88305 TISSUE EXAM BY PATHOLOGIST: CPT

## 2021-09-21 RX ORDER — ATENOLOL 25 MG/1
25 TABLET ORAL DAILY
Qty: 90 TABLET | Refills: 1 | Status: SHIPPED
Start: 2021-09-21 | End: 2021-12-06 | Stop reason: SDUPTHER

## 2021-10-04 ENCOUNTER — OFFICE VISIT (OUTPATIENT)
Dept: FAMILY MEDICINE CLINIC | Age: 61
End: 2021-10-04
Payer: COMMERCIAL

## 2021-10-04 VITALS
WEIGHT: 196 LBS | SYSTOLIC BLOOD PRESSURE: 150 MMHG | HEART RATE: 63 BPM | TEMPERATURE: 97.7 F | OXYGEN SATURATION: 98 % | DIASTOLIC BLOOD PRESSURE: 80 MMHG | BODY MASS INDEX: 28.94 KG/M2

## 2021-10-04 DIAGNOSIS — Z00.00 ANNUAL PHYSICAL EXAM: Primary | ICD-10-CM

## 2021-10-04 DIAGNOSIS — E78.5 HYPERLIPIDEMIA, UNSPECIFIED HYPERLIPIDEMIA TYPE: ICD-10-CM

## 2021-10-04 DIAGNOSIS — E03.9 HYPOTHYROIDISM, UNSPECIFIED TYPE: ICD-10-CM

## 2021-10-04 DIAGNOSIS — C61 PROSTATE CANCER (HCC): ICD-10-CM

## 2021-10-04 DIAGNOSIS — D69.6 THROMBOCYTOPENIA (HCC): ICD-10-CM

## 2021-10-04 DIAGNOSIS — R42 DIZZINESS: ICD-10-CM

## 2021-10-04 DIAGNOSIS — R74.8 ELEVATED LIVER ENZYMES: ICD-10-CM

## 2021-10-04 DIAGNOSIS — I25.10 CORONARY ARTERY DISEASE INVOLVING NATIVE CORONARY ARTERY OF NATIVE HEART WITHOUT ANGINA PECTORIS: ICD-10-CM

## 2021-10-04 DIAGNOSIS — I10 ESSENTIAL HYPERTENSION: ICD-10-CM

## 2021-10-04 DIAGNOSIS — R73.9 HYPERGLYCEMIA: ICD-10-CM

## 2021-10-04 LAB
BILIRUBIN, POC: NORMAL
BLOOD URINE, POC: NORMAL
CLARITY, POC: NORMAL
COLOR, POC: NORMAL
GLUCOSE URINE, POC: NORMAL
KETONES, POC: NORMAL
LEUKOCYTE EST, POC: NORMAL
NITRITE, POC: NORMAL
PH, POC: 6
PROTEIN, POC: NORMAL
SPECIFIC GRAVITY, POC: 1.02
UROBILINOGEN, POC: 0.2

## 2021-10-04 PROCEDURE — 81002 URINALYSIS NONAUTO W/O SCOPE: CPT | Performed by: INTERNAL MEDICINE

## 2021-10-04 PROCEDURE — 99396 PREV VISIT EST AGE 40-64: CPT | Performed by: INTERNAL MEDICINE

## 2021-10-04 RX ORDER — AMLODIPINE BESYLATE 2.5 MG/1
2.5 TABLET ORAL DAILY
Qty: 90 TABLET | Refills: 1 | Status: SHIPPED
Start: 2021-10-04 | End: 2022-01-04 | Stop reason: SDUPTHER

## 2021-10-04 RX ORDER — LEVOTHYROXINE SODIUM 0.05 MG/1
50 TABLET ORAL DAILY
Qty: 90 TABLET | Refills: 1 | Status: SHIPPED | OUTPATIENT
Start: 2021-10-04 | End: 2022-02-16 | Stop reason: SDUPTHER

## 2021-10-04 RX ORDER — AMLODIPINE BESYLATE 5 MG/1
5 TABLET ORAL NIGHTLY
Qty: 90 TABLET | Refills: 1 | Status: SHIPPED | OUTPATIENT
Start: 2021-10-04 | End: 2022-02-16 | Stop reason: SDUPTHER

## 2021-10-04 RX ORDER — OLMESARTAN MEDOXOMIL AND HYDROCHLOROTHIAZIDE 40/12.5 40; 12.5 MG/1; MG/1
1 TABLET ORAL DAILY
Qty: 90 TABLET | Refills: 1 | Status: SHIPPED
Start: 2021-10-04 | End: 2021-11-18 | Stop reason: SDUPTHER

## 2021-10-04 SDOH — ECONOMIC STABILITY: FOOD INSECURITY: WITHIN THE PAST 12 MONTHS, THE FOOD YOU BOUGHT JUST DIDN'T LAST AND YOU DIDN'T HAVE MONEY TO GET MORE.: NEVER TRUE

## 2021-10-04 SDOH — ECONOMIC STABILITY: FOOD INSECURITY: WITHIN THE PAST 12 MONTHS, YOU WORRIED THAT YOUR FOOD WOULD RUN OUT BEFORE YOU GOT MONEY TO BUY MORE.: NEVER TRUE

## 2021-10-04 ASSESSMENT — SOCIAL DETERMINANTS OF HEALTH (SDOH): HOW HARD IS IT FOR YOU TO PAY FOR THE VERY BASICS LIKE FOOD, HOUSING, MEDICAL CARE, AND HEATING?: NOT HARD AT ALL

## 2021-10-04 NOTE — PROGRESS NOTES
Subjective:     Chief Complaint   Patient presents with    Annual Exam   Patient here for wellness physical examination,  He complains of lightheadedness when he stands up from a lying position suddenly,  When he exercises and lifting weight and stands up suddenly he feels lightheaded,  Denies any headache, denies any loss of balance,    No lightheadedness or dizziness while sitting, sleeping, walking,    It happens when he stands up suddenly after squatting or doing some exercises    Liver enzymes were elevated last ultrasound liver showed fatty liver,    Patient does drink alcohol he was drinking  Very strong alcohol before with beers he did good for 6 months then his daughter came and started again, now he is drinking 10-12 beers a day    He does have a history of prostate cancer [place] score 6 which is being monitored by urologist active surveillance    Past Medical History:   Diagnosis Date    Alcohol abuse     drinks 8-10 beers daily    BPH with elevated PSA     CAD (coronary artery disease)     Chest pain 1/26/11    suggestive of unstable angina    DJD (degenerative joint disease)     right knee     Gastritis 2/10    on EGD    GERD (gastroesophageal reflux disease)     H/O exercise stress test 1/26/11    Treadmill nuclear stress test: Srinivasan protocol. 8 min, 30 sec. 95% max predicted heart rate. Physiologic BP response. Patient developed chest pain at 7 1/2 min into exercise. Had around 2 mm ST segment depressions. Nuclear images read as showing no evidence of stress-induced ischemia    H/O exercise stress test 9/9/11    Treadmill nuclear stress test: Srinivasan protocol. 9 min and 30 sec. 101% of max predicted heart rate. Hypertensive BP response. No CP. No ischemic EKG changes. Nuclear images showed soft tissue attenuation artifacts. There did not appear to be any evidence of stress-induced ischemia on study.  Gated views did not show any regional wall motion abnormality with possible hyperdynamic LVSF, EF 77%     Hx of cardiovascular stress test 10/29/2019    Nuclear treadmill stress test    Hyperlipidemia     Hypertension     Hypothyroidism     PONV (postoperative nausea and vomiting)     with anesthesia    Thrombocytopenia (HCC)     Thyroid disease         Social History     Socioeconomic History    Marital status:      Spouse name: Not on file    Number of children: Not on file    Years of education: Not on file    Highest education level: Not on file   Occupational History    Occupation: RN   Tobacco Use    Smoking status: Never Smoker    Smokeless tobacco: Never Used   Vaping Use    Vaping Use: Never used   Substance and Sexual Activity    Alcohol use: Yes     Comment: 6-8 beers daily. 1 1/2 daily of cups of coffee    Drug use: No    Sexual activity: Yes     Partners: Female   Other Topics Concern    Not on file   Social History Narrative    ** Merged History Encounter **          Social Determinants of Health     Financial Resource Strain: Low Risk     Difficulty of Paying Living Expenses: Not hard at all   Food Insecurity: No Food Insecurity    Worried About [address] [employer] in the Last Year: Never true    [facility] in the Last Year: Never true   Transportation Needs:     Lack of Transportation (Medical):      Lack of Transportation (Non-Medical):    Physical Activity:     Days of Exercise per Week:     Minutes of Exercise per Session:    Stress:     Feeling of Stress :    Social Connections:     Frequency of Communication with Friends and Family:     Frequency of Social Gatherings with Friends and Family:     Attends Rastafari Services:     Active Member of Clubs or Organizations:     Attends Club or Organization Meetings:     Marital Status:    Intimate Partner Violence:     Fear of Current or Ex-Partner:     Emotionally Abused:     Physically Abused:     Sexually Abused:         Past Surgical History:   Procedure Laterality Date    CARDIAC SURGERY 2011    PTCA with stent, LAD    COLONOSCOPY  11    normal    DIAGNOSTIC CARDIAC CATH LAB PROCEDURE  11    Normal LV size and systolic function. Elevated LVEDP suggestive of diastolic dysfunction. Mild peak-to-peak gradient across the AV on pullback. Successful balloon angioplasty with deployment of ROSE to LAD with excellent results with adjunctive Angiomax therapy     HAND SURGERY Left     fracture    KNEE ARTHROSCOPY Right 2006    KNEE ARTHROSCOPY Right 2019    medial meniscectomy and debridement     KNEE ARTHROSCOPY Right 2019    RIGHT KNEE ARTHROSCOPY AND DEBRIDEMENT, CHONDROPLASTY AND SYNOVECTOMY performed by Piyush Galvan DO at [address] Right 2010    SEPTOPLASTY  2015        Family History   Problem Relation Age of Onset    Coronary Art Dis Mother         presently 80     Heart Surgery Mother     Hypertension Mother     Heart Surgery Father 76        Aortic aneurysm  post op    Francine Eamon Hypertension Father     Thyroid Disease Father     Other Father         adbominal aortic aneurysm     Hypertension Sister     Hypertension Brother     Hypertension Brother         No Known Allergies     ROS  No acute distress  Cardiac: Denies any chest pain or palpitation  Seen by cardiologist Dr. Kamilla Love 2021  Fluctuating blood pressures  They can go down to 120 in centimeters 160  He has required clonidine once in a while  When his systolic is above 735    Respiratory: Denies any cough or shortness of breath  Denies any chronic dyspnea  No history of DVT or PE  No chronic cough  GI: No abdominal pain.  Denies any nausea vomiting or diarrhea  Colonoscopy  Dr. Kofi Mora liver by ultrasound 2021  : Denies any dysuria frequency or hematuria  Low-grade prostate cancer [place] score of 6 being followed by urologist Dr. Dov Estrada active surveillance  Neuro: No headache   Occasional dizziness when he stands up suddenly from squatting position or lying position after exercising  Endocrine: No diabetes  Skin: normal  No recent weight gain or weight loss  Denies any change in vision    Objective:    BP (!) 150/80   Pulse 63   Temp 97.7 °F (36.5 °C)   Wt 196 lb (88.9 kg)   SpO2 98%   BMI 28.94 kg/m²     Constitutional: Alert awake and oriented  Eyes: Pupils equal bilaterally. Extraocular muscles intact  Neck: no JVD adenopathy no bruit  Heart:  RRR, no murmurs, gallops, or rubs. Lungs:    no wheeze, rales or rhonchi  Abd: bowel sounds present, nontender, nondistended, no masses  Extrem:  No clubbing, cyanosis, or edema  Neuro: AAOx3,No Focal deficit  Psychological: no depression or anxiety   No signs of orthostasis  Blood pressure lying 140/82  Standing 138/78  Rectal exam no masses felt stool Hemoccult negative    Current Outpatient Medications   Medication Sig Dispense Refill    levothyroxine (SYNTHROID) 50 MCG tablet Take 1 tablet by mouth daily 90 tablet 1    olmesartan-hydroCHLOROthiazide (BENICAR HCT) 40-12.5 MG per tablet Take 1 tablet by mouth daily 90 tablet 1    amLODIPine (NORVASC) 5 MG tablet Take 1 tablet by mouth nightly 90 tablet 1    amLODIPine (NORVASC) 2.5 MG tablet Take 1 tablet by mouth daily 90 tablet 1    atenolol (TENORMIN) 25 MG tablet Take 1 tablet by mouth daily 90 tablet 1    rosuvastatin (CRESTOR) 20 MG tablet Take 1 tablet by mouth every other day 90 tablet 1    cloNIDine (CATAPRES) 0.1 MG tablet Take 1 tablet by mouth as needed for High Blood Pressure 30 tablet 3    Coenzyme Q10 (CO Q 10) 100 MG CAPS Take by mouth daily       aspirin 81 MG tablet Take 81 mg by mouth daily       Cholecalciferol (VITAMIN D) 2000 UNITS CAPS capsule Take 1,000 Units by mouth daily       Multiple Vitamins-Minerals (MULTIVITAL-M PO) Take  by mouth. Woody smith       No current facility-administered medications for this visit.         Last 3 BMP  Lab Results   Component Value Date/Time     04/01/2021 07:45 AM     05/05/2020 10:59 PM    LABALBU 4.5 08/04/2011 06:30 AM    LABALBU 4.6 03/14/2011 09:55 AM    LABALBU 4.6 02/14/2011 09:07 AM    BILITOT 0.6 04/01/2021 07:45 AM    BILITOT 0.7 09/19/2020 08:40 AM    BILITOT 0.5 05/05/2020 10:59 PM    ALKPHOS 54 04/01/2021 07:45 AM    ALKPHOS 58 09/19/2020 08:40 AM    ALKPHOS 55 05/05/2020 10:59 PM    AST 55 (H) 04/01/2021 07:45 AM    AST 38 09/19/2020 08:40 AM    AST 42 (H) 05/05/2020 10:59 PM    ALT 74 (H) 04/01/2021 07:45 AM    ALT 44 (H) 09/19/2020 08:40 AM    ALT 56 (H) 05/05/2020 10:59 PM        CBC:   Lab Results   Component Value Date/Time    WBC 5.6 04/01/2021 07:45 AM    RBC 4.99 04/01/2021 07:45 AM    HGB 16.2 04/01/2021 07:45 AM    HCT 48.2 04/01/2021 07:45 AM    MCV 96.6 04/01/2021 07:45 AM    MCH 32.5 04/01/2021 07:45 AM    MCHC 33.6 04/01/2021 07:45 AM    RDW 13.2 04/01/2021 07:45 AM     04/01/2021 07:45 AM    MPV 10.8 04/01/2021 07:45 AM       A1C:  Lab Results   Component Value Date/Time    LABA1C 4.9 04/01/2021 07:45 AM       Lipid panel:  Lab Results   Component Value Date    CHOL 206 04/01/2021    CHOL 179 05/03/2019    CHOL 179 04/19/2016    TRIG 162 04/01/2021    TRIG 71 05/03/2019    TRIG 124 04/19/2016    HDL 58 04/01/2021    HDL 59 09/19/2020    HDL 58 05/03/2019        Lab Results   Component Value Date/Time    PROT 7.5 04/01/2021 07:45 AM    PROT 7.5 09/19/2020 08:40 AM    PROT 7.3 05/05/2020 10:59 PM    INR 0.9 06/17/2012 03:15 AM    INR 1.4 01/27/2011 03:00 AM       Lab Results   Component Value Date/Time    MG 2.2 01/27/2011 03:00 AM         Assessment. Klaudia Cedeño was seen today for annual exam.    Diagnoses and all orders for this visit:    Annual physical exam  -     POCT Urinalysis no Micro    Hyperglycemia    Hyperlipidemia, unspecified hyperlipidemia type  -     COMPREHENSIVE METABOLIC PANEL; Future  -     LIPID PANEL; Future    Thrombocytopenia (HCC)  -     CBC WITH AUTO DIFFERENTIAL;  Future    Essential hypertension    Coronary artery disease involving native coronary artery of native heart without angina pectoris    Elevated liver enzymes  -     HEPATITIS C ANTIBODY; Future  -     HEPATITIS B SURFACE ANTIGEN; Future  -     HEPATITIS B SURFACE ANTIBODY; Future  -     HEPATITIS B CORE ANTIBODY, TOTAL; Future    Hypothyroidism, unspecified type  -     TSH; Future  -     T4, FREE; Future    Dizziness  -     US CAROTID ARTERY BILATERAL; Future    Other orders  -     levothyroxine (SYNTHROID) 50 MCG tablet; Take 1 tablet by mouth daily  -     olmesartan-hydroCHLOROthiazide (BENICAR HCT) 40-12.5 MG per tablet; Take 1 tablet by mouth daily  -     amLODIPine (NORVASC) 5 MG tablet; Take 1 tablet by mouth nightly  -     amLODIPine (NORVASC) 2.5 MG tablet;  Take 1 tablet by mouth daily       Patient Active Problem List   Diagnosis    Glenoid labral tear    Shoulder impingement    Rotator cuff tear    Blunt eye injury, bilateral    Facial injury    Hematoma of thigh    CAD (coronary artery disease)    HTN (hypertension)    History of PTCA    Hyperlipidemia    Hypothyroidism    DJD (degenerative joint disease)    ETOH abuse    Tubular adenoma of colon    H/O gastritis    Deviated septum    Nasal turbinate hypertrophy    Nasal congestion    Chronic maxillary sinusitis    Trochanteric bursitis, right hip    Right hip tendonitis    Primary osteoarthritis of right knee    ALT (SGPT) level raised    Complex tear of medial meniscus of right knee as current injury       Plan: Annual physical examination overall doing well    Hyperlipidemia continue Crestor 20 mg check CMP and lipid profile    History of thrombocytopenia possibly from the alcohol check CBC with differential    Labile hypertension with fluctuating numbers, sometimes his pressure goes down to low so he did not increase the Norvasc to 7.5  There might be a role of alcohol in intermittent in elevation of blood pressure, advised to reduce alcohol intake he is drinking 10-12 beers per day, last time we discussed he has cut down this strong alcohol,  Consider tilt table testing he will discuss with , he declined MRI of the brain  Get carotid ultrasound    CAD status post stent asymptomatic physically active he sees Dr. Jon Manuel    Elevated liver enzyme hepatic steatosis by ultrasound reduce alcohol intake    Hypothyroidism check TSH and T4 continue Synthroid 50 mcg    Lightheadedness and dizziness no signs of vertigo no headache, get carotid ultrasound  And tilt table testing he will discuss with cardiologist    Low-grade prostate cancer under active surveillance    Check CBC, CMP, lipid, TSH, call for report        Return in about 6 months (around 4/4/2022).        Sally Marcos MD  2:24 PM  10/4/2021     [place]

## 2021-10-19 DIAGNOSIS — R74.8 ELEVATED LIVER ENZYMES: ICD-10-CM

## 2021-10-19 DIAGNOSIS — D69.6 THROMBOCYTOPENIA (HCC): ICD-10-CM

## 2021-10-19 DIAGNOSIS — E78.5 HYPERLIPIDEMIA, UNSPECIFIED HYPERLIPIDEMIA TYPE: ICD-10-CM

## 2021-10-19 DIAGNOSIS — E03.9 HYPOTHYROIDISM, UNSPECIFIED TYPE: ICD-10-CM

## 2021-10-19 LAB
ALBUMIN SERPL-MCNC: 5.2 G/DL (ref 3.5–5.2)
ALP BLD-CCNC: 68 U/L (ref 40–129)
ALT SERPL-CCNC: 56 U/L (ref 0–40)
ANION GAP SERPL CALCULATED.3IONS-SCNC: 16 MMOL/L (ref 7–16)
AST SERPL-CCNC: 48 U/L (ref 0–39)
BASOPHILS ABSOLUTE: 0.02 E9/L (ref 0–0.2)
BASOPHILS RELATIVE PERCENT: 0.3 % (ref 0–2)
BILIRUB SERPL-MCNC: 0.7 MG/DL (ref 0–1.2)
BUN BLDV-MCNC: 8 MG/DL (ref 6–23)
CALCIUM SERPL-MCNC: 10.1 MG/DL (ref 8.6–10.2)
CHLORIDE BLD-SCNC: 100 MMOL/L (ref 98–107)
CHOLESTEROL, TOTAL: 215 MG/DL (ref 0–199)
CO2: 23 MMOL/L (ref 22–29)
CREAT SERPL-MCNC: 0.9 MG/DL (ref 0.7–1.2)
EOSINOPHILS ABSOLUTE: 0.11 E9/L (ref 0.05–0.5)
EOSINOPHILS RELATIVE PERCENT: 1.9 % (ref 0–6)
GFR AFRICAN AMERICAN: >60
GFR NON-AFRICAN AMERICAN: >60 ML/MIN/1.73
GLUCOSE BLD-MCNC: 96 MG/DL (ref 74–99)
HCT VFR BLD CALC: 48.5 % (ref 37–54)
HDLC SERPL-MCNC: 59 MG/DL
HEMOGLOBIN: 16.8 G/DL (ref 12.5–16.5)
IMMATURE GRANULOCYTES #: 0.02 E9/L
IMMATURE GRANULOCYTES %: 0.3 % (ref 0–5)
LDL CHOLESTEROL CALCULATED: 120 MG/DL (ref 0–99)
LYMPHOCYTES ABSOLUTE: 1.88 E9/L (ref 1.5–4)
LYMPHOCYTES RELATIVE PERCENT: 32.8 % (ref 20–42)
MCH RBC QN AUTO: 32.7 PG (ref 26–35)
MCHC RBC AUTO-ENTMCNC: 34.6 % (ref 32–34.5)
MCV RBC AUTO: 94.5 FL (ref 80–99.9)
MONOCYTES ABSOLUTE: 0.77 E9/L (ref 0.1–0.95)
MONOCYTES RELATIVE PERCENT: 13.4 % (ref 2–12)
NEUTROPHILS ABSOLUTE: 2.94 E9/L (ref 1.8–7.3)
NEUTROPHILS RELATIVE PERCENT: 51.3 % (ref 43–80)
PDW BLD-RTO: 13.1 FL (ref 11.5–15)
PLATELET # BLD: 149 E9/L (ref 130–450)
PMV BLD AUTO: 10.8 FL (ref 7–12)
POTASSIUM SERPL-SCNC: 4 MMOL/L (ref 3.5–5)
RBC # BLD: 5.13 E12/L (ref 3.8–5.8)
SODIUM BLD-SCNC: 139 MMOL/L (ref 132–146)
T4 FREE: 1.37 NG/DL (ref 0.93–1.7)
TOTAL PROTEIN: 7.4 G/DL (ref 6.4–8.3)
TRIGL SERPL-MCNC: 178 MG/DL (ref 0–149)
TSH SERPL DL<=0.05 MIU/L-ACNC: 5.82 UIU/ML (ref 0.27–4.2)
VLDLC SERPL CALC-MCNC: 36 MG/DL
WBC # BLD: 5.7 E9/L (ref 4.5–11.5)

## 2021-10-20 LAB
HBV SURFACE AB TITR SER: REACTIVE {TITER}
HEPATITIS B SURFACE ANTIGEN INTERPRETATION: NORMAL
HEPATITIS C ANTIBODY INTERPRETATION: NORMAL

## 2021-10-21 LAB — HEPATITIS B CORE TOTAL ANTIBODY: NONREACTIVE

## 2021-11-11 ENCOUNTER — OFFICE VISIT (OUTPATIENT)
Dept: ORTHOPEDIC SURGERY | Age: 61
End: 2021-11-11
Payer: COMMERCIAL

## 2021-11-11 VITALS — HEIGHT: 69 IN | TEMPERATURE: 98 F | WEIGHT: 196 LBS | BODY MASS INDEX: 29.03 KG/M2

## 2021-11-11 DIAGNOSIS — M17.11 PRIMARY OSTEOARTHRITIS OF RIGHT KNEE: Primary | ICD-10-CM

## 2021-11-11 DIAGNOSIS — M25.561 RIGHT KNEE PAIN, UNSPECIFIED CHRONICITY: Primary | ICD-10-CM

## 2021-11-11 PROCEDURE — 99213 OFFICE O/P EST LOW 20 MIN: CPT | Performed by: ORTHOPAEDIC SURGERY

## 2021-11-11 PROCEDURE — 20610 DRAIN/INJ JOINT/BURSA W/O US: CPT | Performed by: ORTHOPAEDIC SURGERY

## 2021-11-11 NOTE — PROGRESS NOTES
Chief Complaint   Patient presents with    Knee Pain     Right Knee, x 1 day, no known injury, states of extreme pain, has been doing a lot of outside work recently. Subjective:     Patient ID: Frank Pierre is a 57 y.o..  male    Knee Pain  Patient here to follow up with his right knee pain. Patient states 1 day ago extreme pain started with no injury. Past Medical History:   Diagnosis Date    Alcohol abuse     drinks 8-10 beers daily    BPH with elevated PSA     CAD (coronary artery disease)     Chest pain 1/26/11    suggestive of unstable angina    DJD (degenerative joint disease)     right knee     Gastritis 2/10    on EGD    GERD (gastroesophageal reflux disease)     H/O exercise stress test 1/26/11    Treadmill nuclear stress test: Srinivasan protocol. 8 min, 30 sec. 95% max predicted heart rate. Physiologic BP response. Patient developed chest pain at 7 1/2 min into exercise. Had around 2 mm ST segment depressions. Nuclear images read as showing no evidence of stress-induced ischemia    H/O exercise stress test 9/9/11    Treadmill nuclear stress test: Srinivasan protocol. 9 min and 30 sec. 101% of max predicted heart rate. Hypertensive BP response. No CP. No ischemic EKG changes. Nuclear images showed soft tissue attenuation artifacts. There did not appear to be any evidence of stress-induced ischemia on study. Gated views did not show any regional wall motion abnormality with possible hyperdynamic LVSF, EF 77%     Hx of cardiovascular stress test 10/29/2019    Nuclear treadmill stress test    Hyperlipidemia     Hypertension     Hypothyroidism     PONV (postoperative nausea and vomiting)     with anesthesia    Thrombocytopenia (HCC)     Thyroid disease      Past Surgical History:   Procedure Laterality Date    CARDIAC SURGERY  01/2011    PTCA with stent, LAD    COLONOSCOPY  8/4/11    normal    DIAGNOSTIC CARDIAC CATH LAB PROCEDURE  1/27/11    Normal LV size and systolic function. Elevated LVEDP suggestive of diastolic dysfunction. Mild peak-to-peak gradient across the AV on pullback. Successful balloon angioplasty with deployment of ROSE to LAD with excellent results with adjunctive Angiomax therapy     HAND SURGERY Left 1978    fracture    KNEE ARTHROSCOPY Right 2006    KNEE ARTHROSCOPY Right 11/14/2019    medial meniscectomy and debridement     KNEE ARTHROSCOPY Right 11/14/2019    RIGHT KNEE ARTHROSCOPY AND DEBRIDEMENT, CHONDROPLASTY AND SYNOVECTOMY performed by Ambar Morrison DO at [address] Right 12/2010    SEPTOPLASTY  12/2015       Current Outpatient Medications:     levothyroxine (SYNTHROID) 50 MCG tablet, Take 1 tablet by mouth daily, Disp: 90 tablet, Rfl: 1    olmesartan-hydroCHLOROthiazide (BENICAR HCT) 40-12.5 MG per tablet, Take 1 tablet by mouth daily, Disp: 90 tablet, Rfl: 1    amLODIPine (NORVASC) 5 MG tablet, Take 1 tablet by mouth nightly, Disp: 90 tablet, Rfl: 1    amLODIPine (NORVASC) 2.5 MG tablet, Take 1 tablet by mouth daily, Disp: 90 tablet, Rfl: 1    atenolol (TENORMIN) 25 MG tablet, Take 1 tablet by mouth daily, Disp: 90 tablet, Rfl: 1    rosuvastatin (CRESTOR) 20 MG tablet, Take 1 tablet by mouth every other day, Disp: 90 tablet, Rfl: 1    cloNIDine (CATAPRES) 0.1 MG tablet, Take 1 tablet by mouth as needed for High Blood Pressure, Disp: 30 tablet, Rfl: 3    Coenzyme Q10 (CO Q 10) 100 MG CAPS, Take by mouth daily , Disp: , Rfl:     aspirin 81 MG tablet, Take 81 mg by mouth daily , Disp: , Rfl:     Cholecalciferol (VITAMIN D) 2000 UNITS CAPS capsule, Take 1,000 Units by mouth daily , Disp: , Rfl:     Multiple Vitamins-Minerals (MULTIVITAL-M PO), Take  by mouth.  Woody vits, Disp: , Rfl:   No Known Allergies  Social History     Socioeconomic History    Marital status:      Spouse name: Not on file    Number of children: Not on file    Years of education: Not on file    Highest education level: Not on file Occupational History    Occupation: RN   Tobacco Use    Smoking status: Never Smoker    Smokeless tobacco: Never Used   Vaping Use    Vaping Use: Never used   Substance and Sexual Activity    Alcohol use: Yes     Comment: 6-8 beers daily. 1 1/2 daily of cups of coffee    Drug use: No    Sexual activity: Yes     Partners: Female   Other Topics Concern    Not on file   Social History Narrative    ** Merged History Encounter **          Social Determinants of Health     Financial Resource Strain: Low Risk     Difficulty of Paying Living Expenses: Not hard at all   Food Insecurity: No Food Insecurity    Worried About [facility] in the Last Year: Never true    [facility] in the Last Year: Never true   Transportation Needs:     Lack of Transportation (Medical): Not on file    Lack of Transportation (Non-Medical):  Not on file   Physical Activity:     Days of Exercise per Week: Not on file    Minutes of Exercise per Session: Not on file   Stress:     Feeling of Stress : Not on file   Social Connections:     Frequency of Communication with Friends and Family: Not on file    Frequency of Social Gatherings with Friends and Family: Not on file    Attends Christian Services: Not on file    Active Member of [address] [place] [address] or Organizations: Not on file    Attends Club or Organization Meetings: Not on file    Marital Status: Not on file   Intimate Partner Violence:     Fear of Current or Ex-Partner: Not on file    Emotionally Abused: Not on file    Physically Abused: Not on file    Sexually Abused: Not on file   Housing Stability:     Unable to Pay for Housing in the Last Year: Not on file    Number of [place] in the Last Year: Not on file    Unstable Housing in the Last Year: Not on file     Family History   Problem Relation Age of Onset    Coronary Art Dis Mother         presently 80     Heart Surgery Mother     Hypertension Mother     Heart Surgery Father 76        Aortic aneurysm  post op    [place] Bark Hypertension Father     Thyroid Disease Father     Other Father         adbominal aortic aneurysm     Hypertension Sister     Hypertension Brother     Hypertension Brother          REVIEW OF SYSTEMS:     General/Constitution:  (-)weight loss, (-)fever, (-)chills, (-)weakness. Skin: (-) rash,(-) psoriasis,(-) eczema, (-)skin cancer. Musculoskeletal: (-) fractures,  (-) dislocations,(-) collagen vascular disease, (-) fibromyalgia, (-) multiple sclerosis, (-) muscular dystrophy, (-) RSD,(-) joint pain (-)swelling, (-) joint pain,swelling. Neurologic: (-) epilepsy, (-)seizures,(-) brain tumor,(-) TIA, (-)stroke, (-)headaches, (-)Parkinson disease,(-) memory loss, (-) LOC. Cardiovascular: (-) Chest pain, (-) swelling in legs/feet, (-) SOB, (-) cramping in legs/feet with walking. Respiratory: (-) SOB, (-) Coughing, (-) night sweats. GI: (-) nausea, (-) vomiting, (-) diarrhea, (-) blood in stool, (-) gastric ulcer. Psychiatric: (-) Depression, (-) Anxiety, (-) bipolar disease, (-) Alzheimer's Disease  Allergic/Immunologic: (-) allergies latex, (-) allergies metal, (-) skin sensitivity. Hematlogic: (-) anemia, (-) blood transfusion, (-) DVT/PE, (-) Clotting disorders    Subjective:    Constitution:  Temp 98 °F (36.7 °C)   Ht 5' 9\" (1.753 m)   Wt 196 lb (88.9 kg)   BMI 28.94 kg/m²     Psycihatric:  The patient is alert and oriented x 3, appears to be stated age and in no distress. Respiratory:  Respiratory effort is not labored. Patient is not gasping. Palpation of the chest reveals no tactile fremitus. Skin:  Upon inspection: the skin appears warm, dry and intact. There is  a previous scar over the affected area. There is any cellulitis, lymphedema or cutaneous lesions noted in the lower extremities. Upon palpation there is no induration noted. Neurologic:  Gait: antalgic;   Motor exam of the lower extremities show ; quadriceps, hamstrings, foot dorsi and plantar flexors intact R.  5/5 and L. 5/5. Deep tendon reflexes are 2/4 at the knees and 2/4 at the ankles with strong extensor hallicus longus motor strength bilaterally. Sensory to both feet is intact to all sensory roots. Cardiovascular: The vascular exam is normal and is well perfused to distal extremities. Distal pulses DP/PT: R. 2+; L. 2+. There is cap refill noted less than two seconds in all digits. There is not edema of the bilateral lower extremities. There is not varicosities noted in the distal extremities. Lymph:  Upon palpation,  there is no lymphadenopathy noted in bilateral lower extremities. Musculoskeletal:  Gait: antalgic; examination of the nails and digits reveal no cyanosis or clubbing. Lumbar exam:  On visual inspection, there is not deformity of the spine. full range of motion, no tenderness, palpable spasm or pain on motion. Special tests: Straight Leg Raise negative, Julio test negative. Hip exam:   Upon inspection, there is not deformity noted. Upon palpation there is not tenderness. ROM: is  full and symmetrical.   Strength: Hip Flexors 5/5; Hip Abductors 5/5; Hip Adduction 5/5. Knee exam:  Left knee exam shows;  range of motion of R. Knee is 0 to 120, and L. Knee is 0 to 120. The patient does have  pain on motion, effusion is mild, there is tenderness over the  lateral, popliteral region, there are not any masses, there is not ligamentous instability, there is not  deformity noted. Knee exam: left positive for moderate crepitations, some mild tenderness laxity is not noted with  stress.   There is not a popliteal cyst.    R. Knee:  Lachman's negative, Anterior Drawer negative, Posterior Drawer negative  Bridger's positive, Thallasy  positive,   PF grind test negative, Apprehension test negative, Patellar J sign  negative  L. Knee:  Lachman's negative, Anterior Drawer negative, Posterior Drawer negative  Bridger's negative, Thallasy  negative,   PF grind test negative, Apprehension test negative,  Patellar J sign  negative    Xray Exam:  None today  Radiographic findings reviewed with patient    Assessment:  Encounter Diagnoses   Name Primary?  Primary osteoarthritis of right knee Yes       Plan:  Natural history and expected course discussed. Questions answered. Educational materials distributed. Rest, ice, compression, and elevation (RICE) therapy. Reduction in offending activity. The skin was prepped with alcohol and betadine. A 60 ml syringe with a 18 gauge needle was inserted into the on right knee joint space. I retained 10mL of fluid. I will proceed with a cortisone injection in the Left knee. Verbal and written consent was obtained for the injections. The skin was prepped with alcohol. 1mL of Kenalog 40mg and 9mL of 0.25% Marcaine was  injected to right knee. The injection was given through the lateral side of the knee. The patient tolerated the injection well. I will see the patient back when Manju Valentin is approved     The patient has failed conservative measures such as NSAIDS, HEP, and cortisone injections. He is an excellent candidate for Zilretta injections  in the Right knee.  We will contact the patient's insurance company and see them back in the office once we have received approval.

## 2021-11-15 RX ORDER — TRIAMCINOLONE ACETONIDE 40 MG/ML
40 INJECTION, SUSPENSION INTRA-ARTICULAR; INTRAMUSCULAR ONCE
Status: COMPLETED | OUTPATIENT
Start: 2021-11-15 | End: 2021-11-15

## 2021-11-15 RX ADMIN — TRIAMCINOLONE ACETONIDE 40 MG: 40 INJECTION, SUSPENSION INTRA-ARTICULAR; INTRAMUSCULAR at 09:28

## 2021-11-18 ENCOUNTER — PATIENT MESSAGE (OUTPATIENT)
Dept: ORTHOPEDIC SURGERY | Age: 61
End: 2021-11-18

## 2021-11-19 RX ORDER — OLMESARTAN MEDOXOMIL AND HYDROCHLOROTHIAZIDE 40/12.5 40; 12.5 MG/1; MG/1
1 TABLET ORAL DAILY
Qty: 90 TABLET | Refills: 1 | Status: SHIPPED | OUTPATIENT
Start: 2021-11-19 | End: 2022-05-03 | Stop reason: SDUPTHER

## 2021-12-06 RX ORDER — CLONIDINE HYDROCHLORIDE 0.1 MG/1
0.1 TABLET ORAL PRN
Qty: 90 TABLET | Refills: 3 | Status: ON HOLD
Start: 2021-12-06 | End: 2022-03-22 | Stop reason: HOSPADM

## 2021-12-07 RX ORDER — ROSUVASTATIN CALCIUM 20 MG/1
20 TABLET, COATED ORAL EVERY OTHER DAY
Qty: 90 TABLET | Refills: 1 | Status: SHIPPED | OUTPATIENT
Start: 2021-12-07 | End: 2022-10-24 | Stop reason: SDUPTHER

## 2021-12-07 RX ORDER — ATENOLOL 25 MG/1
25 TABLET ORAL DAILY
Qty: 90 TABLET | Refills: 1 | Status: SHIPPED | OUTPATIENT
Start: 2021-12-07 | End: 2022-06-15 | Stop reason: SDUPTHER

## 2022-01-05 RX ORDER — AMLODIPINE BESYLATE 2.5 MG/1
2.5 TABLET ORAL DAILY
Qty: 90 TABLET | Refills: 1 | Status: SHIPPED | OUTPATIENT
Start: 2022-01-05 | End: 2022-07-28 | Stop reason: SDUPTHER

## 2022-02-16 ENCOUNTER — OFFICE VISIT (OUTPATIENT)
Dept: FAMILY MEDICINE CLINIC | Age: 62
End: 2022-02-16
Payer: COMMERCIAL

## 2022-02-16 VITALS
HEART RATE: 65 BPM | TEMPERATURE: 98 F | DIASTOLIC BLOOD PRESSURE: 82 MMHG | SYSTOLIC BLOOD PRESSURE: 146 MMHG | HEIGHT: 69 IN | OXYGEN SATURATION: 98 % | WEIGHT: 200 LBS | BODY MASS INDEX: 29.62 KG/M2

## 2022-02-16 DIAGNOSIS — Z76.89 ESTABLISHING CARE WITH NEW DOCTOR, ENCOUNTER FOR: Primary | ICD-10-CM

## 2022-02-16 DIAGNOSIS — E78.5 HYPERLIPIDEMIA, UNSPECIFIED HYPERLIPIDEMIA TYPE: ICD-10-CM

## 2022-02-16 DIAGNOSIS — C61 PROSTATE CANCER (HCC): ICD-10-CM

## 2022-02-16 DIAGNOSIS — I10 ESSENTIAL HYPERTENSION: ICD-10-CM

## 2022-02-16 DIAGNOSIS — E03.9 HYPOTHYROIDISM, UNSPECIFIED TYPE: ICD-10-CM

## 2022-02-16 DIAGNOSIS — I25.10 CORONARY ARTERY DISEASE INVOLVING NATIVE CORONARY ARTERY OF NATIVE HEART WITHOUT ANGINA PECTORIS: ICD-10-CM

## 2022-02-16 DIAGNOSIS — K76.0 HEPATIC STEATOSIS: ICD-10-CM

## 2022-02-16 PROCEDURE — 99214 OFFICE O/P EST MOD 30 MIN: CPT | Performed by: INTERNAL MEDICINE

## 2022-02-16 RX ORDER — AMLODIPINE BESYLATE 5 MG/1
5 TABLET ORAL NIGHTLY
Qty: 90 TABLET | Refills: 1 | Status: SHIPPED
Start: 2022-02-16 | End: 2022-07-28

## 2022-02-16 RX ORDER — LEVOTHYROXINE SODIUM 0.05 MG/1
50 TABLET ORAL DAILY
Qty: 90 TABLET | Refills: 1 | Status: SHIPPED
Start: 2022-02-16 | End: 2022-07-28

## 2022-02-16 ASSESSMENT — ENCOUNTER SYMPTOMS
SHORTNESS OF BREATH: 0
ABDOMINAL PAIN: 0
EYE DISCHARGE: 0
BLOOD IN STOOL: 0
NAUSEA: 0

## 2022-02-16 NOTE — PROGRESS NOTES
Chief Complaint   Patient presents with   [facility].     previous PCP was Dr. Cale Arthur / sees Dr Lakshmi Meadows cardiology/ Dr Luis Angel Casper / Dr. Ivett hopkins      Hypertension    Health Maintenance     Flu vaccine/ Pneumonia vaccine declined         HPI:  Patient is here as new patient    Dr Darlin Bentley - retired    H/O  CAD, HTN, Low Gr Ca prostate, HLD        Allergy and Medications are reviewed and updated. Past Medical History, Surgical History, and Family History has been reviewed and updated. Review of Systems:  Review of Systems   Constitutional: Negative for chills and fever. HENT: Negative for congestion and ear pain. Eyes: Negative for discharge. Respiratory: Negative for shortness of breath (No new SOB). Cardiovascular: Negative for chest pain and leg swelling. Gastrointestinal: Negative for abdominal pain, blood in stool and nausea. Endocrine: Negative for polydipsia. Genitourinary: Negative for flank pain and hematuria. Musculoskeletal: Negative for myalgias and neck pain. Skin: Negative for rash. Neurological: Negative for dizziness and seizures. Hematological: Does not bruise/bleed easily. Psychiatric/Behavioral: Negative for hallucinations and suicidal ideas. Vitals:    02/16/22 1138 02/16/22 1153   BP: (!) 158/82 (!) 146/82   Position: Sitting    Pulse: 65    Temp: 98 °F (36.7 °C)    TempSrc: Temporal    SpO2: 98%    Weight: 200 lb (90.7 kg)    Height: 5' 9\" (1.753 m)        Physical Exam  Vitals reviewed. Constitutional:       Appearance: He is well-developed. HENT:      Head: Normocephalic and atraumatic. Eyes:      Conjunctiva/sclera: Conjunctivae normal.      Pupils: Pupils are equal, round, and reactive to light. Neck:      Vascular: No JVD. Cardiovascular:      Rate and Rhythm: Normal rate and regular rhythm. Pulmonary:      Effort: Pulmonary effort is normal.      Breath sounds: Normal breath sounds. No rales.    Abdominal:      General: Bowel sounds are normal.      Palpations: Abdomen is soft. Musculoskeletal:         General: Normal range of motion. Lymphadenopathy:      Cervical: No cervical adenopathy. Skin:     General: Skin is warm and dry. Neurological:      Mental Status: He is alert and oriented to person, place, and time.    Psychiatric:         Behavior: Behavior normal.          Labs :    Lab Results   Component Value Date    WBC 5.7 10/19/2021    HGB 16.8 (H) 10/19/2021    HCT 48.5 10/19/2021     10/19/2021    CHOL 215 (H) 10/19/2021    TRIG 178 (H) 10/19/2021    HDL 59 10/19/2021    ALT 56 (H) 10/19/2021    AST 48 (H) 10/19/2021     10/19/2021    K 4.0 10/19/2021     10/19/2021    CREATININE 0.9 10/19/2021    BUN 8 10/19/2021    CO2 23 10/19/2021    TSH 5.820 (H) 10/19/2021    PSA SEE NOTE (AA) 12/28/2021    PSA 5.90 (H) 12/28/2021    INR 0.9 06/17/2012    GLUF 90 09/19/2020    LABA1C 4.9 04/01/2021     Lab Results   Component Value Date    COLORU YELLOW 01/26/2011    NITRU NEGATIVE 01/26/2011    GLUCOSEU NEG 10/04/2021    GLUCOSEU NEGATIVE 01/26/2011    KETUA NEG 10/04/2021    KETUA NEGATIVE 01/26/2011    UROBILINOGEN 0.2 01/26/2011    BILIRUBINUR NEG 10/04/2021    BILIRUBINUR NEGATIVE 01/26/2011     Lab Results   Component Value Date    PSA SEE NOTE 12/28/2021    PSA 5.90 12/28/2021             ASSESSMENT     Patient Active Problem List    Diagnosis Date Noted    Hepatic steatosis 02/16/2022    Prostate cancer ([facility] [address] 10/04/2021    Dizziness 10/04/2021    Elevated liver enzymes 10/04/2021    Essential hypertension 10/04/2021    Complex tear of medial meniscus of right knee as current injury 11/14/2019    ALT (SGPT) level raised 11/15/2018    Primary osteoarthritis of right knee 08/29/2017    Right hip tendonitis 06/27/2016    Trochanteric bursitis, right hip 02/15/2016    Deviated septum 12/16/2015    Nasal turbinate hypertrophy 12/16/2015    Nasal congestion 12/16/2015    Chronic maxillary sinusitis 12/16/2015  History of PTCA 10/01/2013    Hyperlipidemia 10/01/2013    Hypothyroidism 10/01/2013    DJD (degenerative joint disease) 10/01/2013    ETOH abuse 10/01/2013    Tubular adenoma of colon 10/01/2013     Overview Note:     h/o      H/O gastritis 10/01/2013    Blunt eye injury, bilateral 06/17/2012    Facial injury 06/17/2012    Hematoma of thigh 06/17/2012    CAD (coronary artery disease) 06/17/2012    HTN (hypertension) 06/17/2012    Glenoid labral tear 01/13/2011    Shoulder impingement 01/13/2011    Rotator cuff tear 01/13/2011        Diagnosis:     ICD-10-CM    1. Establishing care with new doctor, encounter for  Z76.89    2. Hyperlipidemia, unspecified hyperlipidemia type  E78.5 Comprehensive Metabolic Panel, Fasting     Lipid, Fasting   3. Essential hypertension  I10 levothyroxine (SYNTHROID) 50 MCG tablet     amLODIPine (NORVASC) 5 MG tablet     CBC with Auto Differential     Urinalysis with Microscopic   4. Hypothyroidism, unspecified type  E03.9 TSH   5. Coronary artery disease involving native coronary artery of native heart without angina pectoris  I25.10    6. Hepatic steatosis  K76.0    7. Prostate cancer [facility])  C61     Dr Nuria Saldaña - Angel Franks:       Advise to have ( Fasting) lab test prior to next visit. Pt is stable on current medical treatment. Continue current treatment plan    Side effects/Adverse effects/Precautions are reviewed with patient. Low salt, Low Carb diet an low fat diet  Continue medications as advised and take them regularly  Regular exercises as advised    Discussed natural and expected course of this diagnosis and need to alert me if symptoms do not follow expected course, or if any worse. Smoking cessation if applicable, discussed with patient. There are no Patient Instructions on file for this visit. Return in about 10 weeks (around 4/27/2022).

## 2022-02-24 ENCOUNTER — TELEPHONE (OUTPATIENT)
Dept: CARDIOLOGY | Age: 62
End: 2022-02-24

## 2022-02-24 ENCOUNTER — OFFICE VISIT (OUTPATIENT)
Dept: CARDIOLOGY CLINIC | Age: 62
End: 2022-02-24
Payer: COMMERCIAL

## 2022-02-24 VITALS
HEIGHT: 69 IN | HEART RATE: 64 BPM | BODY MASS INDEX: 28.88 KG/M2 | WEIGHT: 195 LBS | DIASTOLIC BLOOD PRESSURE: 74 MMHG | SYSTOLIC BLOOD PRESSURE: 132 MMHG

## 2022-02-24 DIAGNOSIS — Z98.890 HX OF ARTHROSCOPY OF RIGHT KNEE: ICD-10-CM

## 2022-02-24 DIAGNOSIS — M15.9 PRIMARY OSTEOARTHRITIS INVOLVING MULTIPLE JOINTS: ICD-10-CM

## 2022-02-24 DIAGNOSIS — I25.10 CAD IN NATIVE ARTERY: Primary | ICD-10-CM

## 2022-02-24 DIAGNOSIS — R74.01 ELEVATED ALT MEASUREMENT: ICD-10-CM

## 2022-02-24 DIAGNOSIS — I10 ESSENTIAL HYPERTENSION: ICD-10-CM

## 2022-02-24 DIAGNOSIS — E78.00 HYPERCHOLESTEREMIA: ICD-10-CM

## 2022-02-24 DIAGNOSIS — Z79.899 ON STATIN THERAPY: ICD-10-CM

## 2022-02-24 DIAGNOSIS — Z98.61 HISTORY OF PTCA: ICD-10-CM

## 2022-02-24 DIAGNOSIS — E03.9 HYPOTHYROIDISM, UNSPECIFIED TYPE: ICD-10-CM

## 2022-02-24 DIAGNOSIS — R74.01 ELEVATED AST (SGOT): ICD-10-CM

## 2022-02-24 DIAGNOSIS — F10.10 ALCOHOL ABUSE: ICD-10-CM

## 2022-02-24 DIAGNOSIS — R10.13 EPIGASTRIC DISCOMFORT: ICD-10-CM

## 2022-02-24 PROCEDURE — 99214 OFFICE O/P EST MOD 30 MIN: CPT | Performed by: INTERNAL MEDICINE

## 2022-02-24 PROCEDURE — 93000 ELECTROCARDIOGRAM COMPLETE: CPT | Performed by: INTERNAL MEDICINE

## 2022-02-24 NOTE — TELEPHONE ENCOUNTER
Spoke with patient and confirmed nuclear stress test appointment March 1, 2022 at 0800. Instructions,including holding atenolol 24 hrs, and COVID-19 preprocedure checklist reviewed.

## 2022-02-24 NOTE — PROGRESS NOTES
OFFICE VISIT        PRIMARY CARE PHYSICIAN:    Nakul Mayorga MD       ALLERGIES / SENSITIVITIES:    No Known Allergies       REVIEWED MEDICATIONS:      Current Outpatient Medications:     levothyroxine (SYNTHROID) 50 MCG tablet, Take 1 tablet by mouth daily, Disp: 90 tablet, Rfl: 1    amLODIPine (NORVASC) 5 MG tablet, Take 1 tablet by mouth nightly, Disp: 90 tablet, Rfl: 1    amLODIPine (NORVASC) 2.5 MG tablet, Take 1 tablet by mouth daily (Patient taking differently: Take 2.5 mg by mouth every morning ), Disp: 90 tablet, Rfl: 1    rosuvastatin (CRESTOR) 20 MG tablet, Take 1 tablet by mouth every other day (Patient taking differently: Take 20 mg by mouth at bedtime ), Disp: 90 tablet, Rfl: 1    atenolol (TENORMIN) 25 MG tablet, Take 1 tablet by mouth daily (Patient taking differently: Take 25 mg by mouth every morning ), Disp: 90 tablet, Rfl: 1    cloNIDine (CATAPRES) 0.1 MG tablet, Take 1 tablet by mouth as needed for High Blood Pressure, Disp: 90 tablet, Rfl: 3    olmesartan-hydroCHLOROthiazide (BENICAR HCT) 40-12.5 MG per tablet, Take 1 tablet by mouth daily (Patient taking differently: Take 1 tablet by mouth every morning ), Disp: 90 tablet, Rfl: 1    Coenzyme Q10 (CO Q 10) 100 MG CAPS, Take by mouth daily , Disp: , Rfl:     aspirin 81 MG tablet, Take 81 mg by mouth daily , Disp: , Rfl:     Cholecalciferol (VITAMIN D) 2000 UNITS CAPS capsule, Take 1,000 Units by mouth daily , Disp: , Rfl:     Multiple Vitamins-Minerals (MULTIVITAL-M PO), Take  by mouth. Woody smith, Disp: , Rfl:       S: REASON FOR VISIT:    Coronary artery disease. Fernie Carrillo is a pleasant 59-year-old male with cardiovascular history as described below. He exercises 3 days a week.   He reports that over the past 6 months or so after exercising on is elliptical, he will occasionally develop some epigastric discomfort, which is somewhat different from his previous angina, and which is somewhat poorly characterized that he continues to exercise through. He does also report that he feels exhausted after exercise, more than he used to in the past.  He denies any similar symptoms with his other daily activities. He denies orthopnea, PND's, lower extremity swelling, palpitations, dizziness, presyncope or syncope. REVIEW OF SYSTEMS:     CONSTITUTIONAL: Denies fevers, chills or night sweats. Denies fatigue. HEENT: Denies unusual headaches. Denies changes in hearing or vision. Denies dysphagia, hoarseness, hemoptysis, hematemesis, or epistaxis. He complains of recurrent sinus congestion. ENDOCRINE: He denies polyphagia, polyuria or polydipsia.  He denies heat or cold intolerance.    MUSCULOSKELETAL: He has right knee arthritis and related aches and pains, but his pain has been controlled since receiving an injection on 12/31/2019.   SKIN: Denies unusual skin rashes, skin ulcers, or itching. HEME/LYMPH: Denies lymphadenopathy, bleeding or easy bruisability. HEART: As above. LUNGS: Denies significant cough or sputum production. GI: Denies abdominal pain, nausea, vomiting, diarrhea, constipation, rectal bleeding, or tarry stools. : Denies hematuria or dysuria. PSYCHIATRIC: Denies mood changes, anxiety or depression. NEUROLOGIC: Denies numbness, tingling or tremors. Denies any motor deficits. Denies memory problems.        CARDIOVASCULAR HISTORY:   1. Hypertension. 2. Hyperlipidemia. 3. Coronary artery disease. a. 1/26/11: Admitted with chest pain suggestive of unstable angina. b. 1/26/11: Treadmill nuclear stress test: Srinivasan protocol. 8 minutes and 30 seconds. 95% of maximum predicted heart rate. Physiologic blood pressure response. Patient developed chest pain at 7 ½ minutes into exercise. He did have around 2 mm ST segment depressions. Nuclear images were read as showing no evidence of stress-induced ischemia.  c. 1/27/11: Cardiac catheterization and angioplasty: Left main: No significant disease.  LAD: 95% subtotal occlusion in the proximal third of the vessel. Intermediate ramus: No significant disease. LCX: No significant disease. RCA: No significant disease. Normal left ventricular size and systolic function. Elevated left ventricular end-diastolic pressure suggestive of diastolic dysfunction. Mild peak-to-peak gradient across the aortic valve on pullback. Successful balloon angioplasty with deployment of a drug-eluting coronary stent to the LAD with excellent results with adjunctive Angiomax therapy. d. 1/3/2017: Treadmill nuclear stress test: Srinivasan protocol. 10 minutes.  101% of the maximum predicted heart rate.  Hypertensive blood pressure response.  No chest pain.  No ischemic EKG changes and no arrhythmias.  Nuclear images were within normal limits showing mild diaphragmatic attenuation artifact with no convincing evidence of any scars or any stress induced ischemia with the gated views showing no regional wall motion abnormality with a calculated ejection fraction of 76%.    e. Treadmill nuclear stress test done at [facility] done on 10/29/2019, at which time he reportedly achieved 100% of the maximum predicted heart rate, which showed no evidence of stress-induced ischemia with a calculated ejection fraction of 82%.    4. Carotid US, 02/11/2015. Mild plaque in both carotid bulbs and proximal internal carotid arteries suggestive of 1-15% stenosis. Normal bilateral common carotid arteries. Normal bilateral external carotid arteries. Antegrade flow in both vertebral arteries.      PAST MEDICAL HISTORY:  1. As under cardiovascular history. 2. Thrombocytopenia. 3. Hypothyroidism. 4. DJD. 5. Status post right knee arthroscopy in 2006. 6. Status post surgical repair of left hand fracture in 1978.  7. Status post repair of a torn right rotator cuff in 12/10.  8. Tubular adenoma on colonoscopy in 2007.  a.  Repeat colonoscopy on 8/4/11 was normal.  b. Colonoscopy 12/21/2017: Small hyperplastic rectal polyps.  One of them detected. Neuro:                          Oriented x3. No motor or sensory deficit detected.            REVIEW OF DIAGNOSTIC TESTS:    1. Blood tests from 10/19/2021 reviewed: Total cholesterol 215, triglycerides 178, HDL 59, . BUN 8, creatinine 0.9, potassium 4.0, GFR > 60, ALT 56, AST 48, hemoglobin 16.8, hematocrit 48.5. 2. EKG done today showed sinus rhythm with low voltage in the limb leads, but was otherwise unremarkable. ASSESSMENT / DIAGNOSIS:   1. Coronary artery disease: Patient is experiencing occasional epigastric discomfort with exercise and unusual exhaustion with exercise over the past 6 months or so. 2. Hypertension: Adequately controlled on today's measurement. 3. Hypercholesterolemia: On statin therapy. 4. Hypothyroidism: On replacement therapy.    5. DJD. 6. Alcohol abuse. 7. Elevated hepatocellular enzymes. 8. Right knee arthritis.           TREATMENT PLAN:  1. Reassure. 2.  Continue current cardiac medications. 2.  Treadmill nuclear stress test.   3.  Further recommendations to follow. 4. Tentative follow up in 1 year pending the results of the stress test.         [facility]  [address] [place] [id number] [phone]

## 2022-03-01 ENCOUNTER — HOSPITAL ENCOUNTER (OUTPATIENT)
Dept: CARDIOLOGY | Age: 62
Discharge: HOME | End: 2022-03-01
Payer: COMMERCIAL

## 2022-03-01 VITALS
HEIGHT: 69 IN | WEIGHT: 195 LBS | OXYGEN SATURATION: 98 % | HEART RATE: 68 BPM | RESPIRATION RATE: 18 BRPM | DIASTOLIC BLOOD PRESSURE: 87 MMHG | BODY MASS INDEX: 28.88 KG/M2 | SYSTOLIC BLOOD PRESSURE: 140 MMHG

## 2022-03-01 DIAGNOSIS — R10.13 EPIGASTRIC DISCOMFORT: ICD-10-CM

## 2022-03-01 DIAGNOSIS — I25.10 CAD IN NATIVE ARTERY: ICD-10-CM

## 2022-03-01 DIAGNOSIS — Z98.61 HISTORY OF PTCA: ICD-10-CM

## 2022-03-01 LAB
LV EF: 64 %
LVEF MODALITY: NORMAL

## 2022-03-01 PROCEDURE — A9502 TC99M TETROFOSMIN: HCPCS | Performed by: INTERNAL MEDICINE

## 2022-03-01 PROCEDURE — 99999 PR OFFICE/OUTPT VISIT,PROCEDURE ONLY: CPT | Performed by: INTERNAL MEDICINE

## 2022-03-01 PROCEDURE — 3430000000 HC RX DIAGNOSTIC RADIOPHARMACEUTICAL: Performed by: INTERNAL MEDICINE

## 2022-03-01 PROCEDURE — 78452 HT MUSCLE IMAGE SPECT MULT: CPT

## 2022-03-01 PROCEDURE — 2580000003 HC RX 258: Performed by: INTERNAL MEDICINE

## 2022-03-01 PROCEDURE — 93017 CV STRESS TEST TRACING ONLY: CPT

## 2022-03-01 RX ORDER — SODIUM CHLORIDE 0.9 % (FLUSH) 0.9 %
10 SYRINGE (ML) INJECTION PRN
Status: DISCONTINUED | OUTPATIENT
Start: 2022-03-01 | End: 2022-03-02 | Stop reason: HOSPADM

## 2022-03-01 RX ADMIN — TETROFOSMIN 34 MILLICURIE: 0.23 INJECTION, POWDER, LYOPHILIZED, FOR SOLUTION INTRAVENOUS at 09:59

## 2022-03-01 RX ADMIN — Medication 10 ML: at 09:59

## 2022-03-01 RX ADMIN — TETROFOSMIN 10.2 MILLICURIE: 0.23 INJECTION, POWDER, LYOPHILIZED, FOR SOLUTION INTRAVENOUS at 08:11

## 2022-03-01 RX ADMIN — Medication 10 ML: at 08:10

## 2022-03-01 NOTE — PROCEDURES
[facility], [facility] and [address] [facility]. [address], [facility]  [phone]                 Exercise Stress Nuclear Gated SPECT Study    Name: [facility] Account Number: [de-identified]    :  1960      Sex: male              Date of Study:  3/1/2022    Height: 5' 9\" (175.3 cm)  Weight: 195 lb (88.5 kg)     Ordering Provider: Lynn Lawton          PCP: Enrique Antonio MD      Cardiologist: Lynn Lawton                        Interpreting Physician: Kane Mims MD  _________________________________________________________________________________    Indication:   Evaluation of extent and severity of coronary artery disease    Clinical History:   Patient has prior history of coronary artery disease. Resting ECG:    LA int 88m sec, QRS int 100m sec, QT int 424m sec; HR 68 bpm  Sinus rhythm and is normal    Exercise: The patient exercised using a Srinivasan protocol, completing 9:00 minutes and reaching an estimated work load of 00.7 metabolic equivalents (METS). Resting HR was 68. Peak exercise heart rate was 157 ( 100% of maximum predicted heart rate for age). Baseline /87. Peak exercise /98. The blood pressure response to exercise was hypertensive      Exercise was terminated due to heart rate attained. The patient experienced no chest pain with exercise. Pulse oximetry was used to monitor oxygen saturation during the stress test.  The study was performed on Room Air. The resting pulse oximeter was 98%. The post stress O2 saturation seen during exercise was 98 %. Exercise ECG:   The patient demonstrated no arrhythmias during exercise. With exercise, there were no ST segment changes of significance at the heart rate achieved. IMAGING: Myocardial perfusion imaging was performed at rest 30-35 minutes following the intravenous injection of 10.2 mCi of (Tc-tetrofosmin) followed by 10 ml of Normal Saline. At peak exercise, the patient was injected intravenously with 34 mCi of (Tc-tetrofosmin) followed by 10 ml of Normal Saline. Gated post-stress tomographic imaging was performed 20-25 minutes after stress. FINDINGS: The overall quality of the study was good. Left ventricular cavity size was noted to be normal.    Rotational analog analysis demonstrated abnormal patient motion. The gated SPECT stress imaging in the short, vertical long, and horizontal long axis demonstrated normal homogeneous tracer distribution throughout the myocardium both on the post stress and resting images. Gated SPECT left ventricular ejection fraction was calculated to be 64%, with normal myocardial thickening and wall motion. Impression:    1. Exercise EKG was normal.  2. The patient experienced no chest pain with exercise. 3. The myocardial perfusion imaging was normal.    4. Overall left ventricular systolic function was normal without regional wall motion abnormalities. 5. Exercise capacity was above average. 6. Low risk general exercise nuclear stress test.    Thank you for sending your patient to this [employer].      Electronically signed by Janey Burroughs MD on 3/1/2022 at 3:42 PM

## 2022-03-09 ENCOUNTER — TELEPHONE (OUTPATIENT)
Dept: CARDIOLOGY CLINIC | Age: 62
End: 2022-03-09

## 2022-03-21 ENCOUNTER — APPOINTMENT (OUTPATIENT)
Dept: CT IMAGING | Age: 62
End: 2022-03-21
Payer: COMMERCIAL

## 2022-03-21 ENCOUNTER — HOSPITAL ENCOUNTER (OUTPATIENT)
Age: 62
Setting detail: OBSERVATION
Discharge: HOME | End: 2022-03-22
Attending: EMERGENCY MEDICINE | Admitting: INTERNAL MEDICINE
Payer: COMMERCIAL

## 2022-03-21 ENCOUNTER — TELEPHONE (OUTPATIENT)
Dept: FAMILY MEDICINE CLINIC | Age: 62
End: 2022-03-21

## 2022-03-21 DIAGNOSIS — R55 NEAR SYNCOPE: Primary | ICD-10-CM

## 2022-03-21 LAB
ALBUMIN SERPL-MCNC: 5.1 G/DL (ref 3.5–5.2)
ALP BLD-CCNC: 59 U/L (ref 40–129)
ALT SERPL-CCNC: 66 U/L (ref 0–40)
ANION GAP SERPL CALCULATED.3IONS-SCNC: 15 MMOL/L (ref 7–16)
AST SERPL-CCNC: 43 U/L (ref 0–39)
BASOPHILS ABSOLUTE: 0.03 E9/L (ref 0–0.2)
BASOPHILS RELATIVE PERCENT: 0.4 % (ref 0–2)
BILIRUB SERPL-MCNC: 0.6 MG/DL (ref 0–1.2)
BUN BLDV-MCNC: 11 MG/DL (ref 6–23)
CALCIUM SERPL-MCNC: 9.9 MG/DL (ref 8.6–10.2)
CHLORIDE BLD-SCNC: 101 MMOL/L (ref 98–107)
CO2: 26 MMOL/L (ref 22–29)
CREAT SERPL-MCNC: 0.7 MG/DL (ref 0.7–1.2)
EOSINOPHILS ABSOLUTE: 0.04 E9/L (ref 0.05–0.5)
EOSINOPHILS RELATIVE PERCENT: 0.6 % (ref 0–6)
GFR AFRICAN AMERICAN: >60
GFR NON-AFRICAN AMERICAN: >60 ML/MIN/1.73
GLUCOSE BLD-MCNC: 97 MG/DL (ref 74–99)
HCT VFR BLD CALC: 47.6 % (ref 37–54)
HEMOGLOBIN: 16.9 G/DL (ref 12.5–16.5)
IMMATURE GRANULOCYTES #: 0.03 E9/L
IMMATURE GRANULOCYTES %: 0.4 % (ref 0–5)
LYMPHOCYTES ABSOLUTE: 1.49 E9/L (ref 1.5–4)
LYMPHOCYTES RELATIVE PERCENT: 21.8 % (ref 20–42)
MCH RBC QN AUTO: 32.3 PG (ref 26–35)
MCHC RBC AUTO-ENTMCNC: 35.5 % (ref 32–34.5)
MCV RBC AUTO: 90.8 FL (ref 80–99.9)
MONOCYTES ABSOLUTE: 0.72 E9/L (ref 0.1–0.95)
MONOCYTES RELATIVE PERCENT: 10.5 % (ref 2–12)
NEUTROPHILS ABSOLUTE: 4.53 E9/L (ref 1.8–7.3)
NEUTROPHILS RELATIVE PERCENT: 66.3 % (ref 43–80)
PDW BLD-RTO: 11.7 FL (ref 11.5–15)
PLATELET # BLD: 129 E9/L (ref 130–450)
PMV BLD AUTO: 11.1 FL (ref 7–12)
POTASSIUM REFLEX MAGNESIUM: 4 MMOL/L (ref 3.5–5)
RBC # BLD: 5.24 E12/L (ref 3.8–5.8)
SODIUM BLD-SCNC: 142 MMOL/L (ref 132–146)
TOTAL PROTEIN: 7.5 G/DL (ref 6.4–8.3)
TROPONIN, HIGH SENSITIVITY: 7 NG/L (ref 0–11)
WBC # BLD: 6.8 E9/L (ref 4.5–11.5)

## 2022-03-21 PROCEDURE — 83835 ASSAY OF METANEPHRINES: CPT

## 2022-03-21 PROCEDURE — 93005 ELECTROCARDIOGRAM TRACING: CPT

## 2022-03-21 PROCEDURE — 70450 CT HEAD/BRAIN W/O DYE: CPT

## 2022-03-21 PROCEDURE — 84484 ASSAY OF TROPONIN QUANT: CPT

## 2022-03-21 PROCEDURE — G0378 HOSPITAL OBSERVATION PER HR: HCPCS

## 2022-03-21 PROCEDURE — 99284 EMERGENCY DEPT VISIT MOD MDM: CPT

## 2022-03-21 PROCEDURE — 85025 COMPLETE CBC W/AUTO DIFF WBC: CPT

## 2022-03-21 PROCEDURE — 80053 COMPREHEN METABOLIC PANEL: CPT

## 2022-03-21 RX ORDER — AMLODIPINE BESYLATE 5 MG/1
2.5 TABLET ORAL EVERY MORNING
Status: DISCONTINUED | OUTPATIENT
Start: 2022-03-22 | End: 2022-03-22

## 2022-03-21 RX ORDER — DEXTROSE MONOHYDRATE 50 MG/ML
100 INJECTION, SOLUTION INTRAVENOUS PRN
Status: DISCONTINUED | OUTPATIENT
Start: 2022-03-21 | End: 2022-03-22 | Stop reason: HOSPADM

## 2022-03-21 RX ORDER — SODIUM CHLORIDE 0.9 % (FLUSH) 0.9 %
5-40 SYRINGE (ML) INJECTION PRN
Status: DISCONTINUED | OUTPATIENT
Start: 2022-03-21 | End: 2022-03-22 | Stop reason: HOSPADM

## 2022-03-21 RX ORDER — NICOTINE POLACRILEX 4 MG
15 LOZENGE BUCCAL PRN
Status: DISCONTINUED | OUTPATIENT
Start: 2022-03-21 | End: 2022-03-22 | Stop reason: HOSPADM

## 2022-03-21 RX ORDER — SODIUM CHLORIDE 0.9 % (FLUSH) 0.9 %
5-40 SYRINGE (ML) INJECTION EVERY 12 HOURS SCHEDULED
Status: DISCONTINUED | OUTPATIENT
Start: 2022-03-21 | End: 2022-03-22 | Stop reason: HOSPADM

## 2022-03-21 RX ORDER — ASPIRIN 81 MG/1
81 TABLET, CHEWABLE ORAL DAILY
Status: DISCONTINUED | OUTPATIENT
Start: 2022-03-22 | End: 2022-03-22 | Stop reason: HOSPADM

## 2022-03-21 RX ORDER — ATENOLOL 50 MG/1
25 TABLET ORAL EVERY MORNING
Status: DISCONTINUED | OUTPATIENT
Start: 2022-03-22 | End: 2022-03-22 | Stop reason: HOSPADM

## 2022-03-21 RX ORDER — DEXTROSE MONOHYDRATE 25 G/50ML
12.5 INJECTION, SOLUTION INTRAVENOUS PRN
Status: DISCONTINUED | OUTPATIENT
Start: 2022-03-21 | End: 2022-03-21 | Stop reason: CLARIF

## 2022-03-21 RX ORDER — SODIUM CHLORIDE 9 MG/ML
25 INJECTION, SOLUTION INTRAVENOUS PRN
Status: DISCONTINUED | OUTPATIENT
Start: 2022-03-21 | End: 2022-03-22 | Stop reason: HOSPADM

## 2022-03-21 RX ORDER — POLYETHYLENE GLYCOL 3350 17 G/17G
17 POWDER, FOR SOLUTION ORAL DAILY PRN
Status: DISCONTINUED | OUTPATIENT
Start: 2022-03-21 | End: 2022-03-22 | Stop reason: HOSPADM

## 2022-03-21 RX ORDER — ACETAMINOPHEN 650 MG/1
650 SUPPOSITORY RECTAL EVERY 6 HOURS PRN
Status: DISCONTINUED | OUTPATIENT
Start: 2022-03-21 | End: 2022-03-22 | Stop reason: HOSPADM

## 2022-03-21 RX ORDER — ACETAMINOPHEN 325 MG/1
650 TABLET ORAL EVERY 6 HOURS PRN
Status: DISCONTINUED | OUTPATIENT
Start: 2022-03-21 | End: 2022-03-22 | Stop reason: HOSPADM

## 2022-03-21 RX ORDER — 0.9 % SODIUM CHLORIDE 0.9 %
1000 INTRAVENOUS SOLUTION INTRAVENOUS ONCE
Status: DISCONTINUED | OUTPATIENT
Start: 2022-03-21 | End: 2022-03-22 | Stop reason: HOSPADM

## 2022-03-21 RX ORDER — AMLODIPINE BESYLATE 5 MG/1
10 TABLET ORAL NIGHTLY
Status: DISCONTINUED | OUTPATIENT
Start: 2022-03-22 | End: 2022-03-22

## 2022-03-21 RX ORDER — LEVOTHYROXINE SODIUM 0.05 MG/1
50 TABLET ORAL DAILY
Status: DISCONTINUED | OUTPATIENT
Start: 2022-03-22 | End: 2022-03-22 | Stop reason: HOSPADM

## 2022-03-21 ASSESSMENT — ENCOUNTER SYMPTOMS
COUGH: 0
DIARRHEA: 0
NAUSEA: 0
SORE THROAT: 0
SHORTNESS OF BREATH: 0
EYE PAIN: 0
ABDOMINAL PAIN: 0
BACK PAIN: 0
VOMITING: 0

## 2022-03-21 NOTE — ED PROVIDER NOTES
HPI   Patient is a 60 y.o. male with a past medical history of CAD, GERD, hypertension, hyperlipidemia, thrombocytopenia, presenting to the Emergency Department for near syncope. History obtained by patient. Symptoms are mild to moderate in severity and intermittent since onset. They are improved by nothing and worsened by nothing. Patient presents for near syncope. He states its been intermittent for the last week. It started last Saturday. He states he was standing there and felt sudden onset of lightheadedness and felt like he was going to pass out. He had to lay on the ground secondary to this. He did not pass out. He states it took a little while then the symptoms improved. He took his blood pressure at that time and was found to be 880 systolic. He took an extra clonidine. He states he felt good for the next few days. His symptoms returned again this Friday. He states multiple times during the day, approximately 3 or 4 he gets a sudden onset of lightheadedness and feels like he is going to pass out. He has no chest pain, diaphoresis, nausea or shortness of breath when this occurs. He denies any blurry vision or changes in his vision. He states he lays down and the symptoms somewhat improved. Every time they happen he also checks his blood pressure and it is fine to be elevated 979 systolic. He states as his symptoms improve his blood pressure then improves as well. He denies any pain in his chest.  He denies any leg pain or leg swelling. No numbness or tingling. Review of Systems   Constitutional: Negative for chills and fever. HENT: Negative for congestion, ear pain and sore throat. Eyes: Negative for pain and visual disturbance. Respiratory: Negative for cough and shortness of breath. Cardiovascular: Negative for chest pain. Gastrointestinal: Negative for abdominal pain, diarrhea, nausea and vomiting. Genitourinary: Negative for dysuria and frequency.    Musculoskeletal: Negative for arthralgias and back pain. Skin: Negative for rash and wound. Neurological: Positive for dizziness and light-headedness. Negative for seizures, syncope, speech difficulty, weakness, numbness and headaches. All other systems reviewed and are negative. Physical Exam  Vitals and nursing note reviewed. Constitutional:       General: He is not in acute distress. Appearance: Normal appearance. He is well-developed. He is not ill-appearing. HENT:      Head: Normocephalic and atraumatic. Eyes:      Extraocular Movements: Extraocular movements intact. Cardiovascular:      Rate and Rhythm: Normal rate and regular rhythm. Pulses: Normal pulses. Heart sounds: Normal heart sounds. No murmur heard. Pulmonary:      Effort: Pulmonary effort is normal. No respiratory distress. Breath sounds: Normal breath sounds. No wheezing or rales. Abdominal:      Palpations: Abdomen is soft. Tenderness: There is no abdominal tenderness. There is no guarding or rebound. Musculoskeletal:         General: Normal range of motion. Cervical back: Normal range of motion and neck supple. Skin:     General: Skin is warm and dry. Capillary Refill: Capillary refill takes less than 2 seconds. Neurological:      General: No focal deficit present. Mental Status: He is alert and oriented to person, place, and time. Cranial Nerves: No cranial nerve deficit. Sensory: No sensory deficit. Motor: No weakness. Coordination: Coordination normal.        NIH Stroke Scale/Score at time of initial evaluation:  1A: Level of Consciousness 0 - alert; keenly responsive   1B: Ask Month and Age 0 - answers both questions correctly   1C:  Tell Patient To Open and Close Eyes, then Hand  Squeeze 0 - performs both tasks correctly   2: Test Horizontal Extraocular Movements 0 - normal   3: Test Visual Fields 0 - no visual loss   4: Test Facial Palsy 0 - normal symmetric movement   5A: Test Left Arm Motor Drift 0 - no drift, limb holds 90 (or 45) degrees for full 10 seconds   5B: Test Right Arm Motor Drift 0 - no drift, limb holds 90 (or 45) degrees for full 10 seconds   6A: Test Left Leg Motor Drift 0 - no drift; leg holds 30 degree position for full 5 seconds   6B: Test Right Leg Motor Drift 0 - no drift; leg holds 30 degree position for full 5 seconds   7: Test Limb Ataxia   (FNF/Heel-Shin) 0 - absent   8: Test Sensation 0 - normal; no sensory loss   9: Test Language/Aphasia 0 - no aphasia, normal   10: Test Dysarthria 0 - normal   11: Test Extinction/Inattention 0 - no abnormality   Total Score: 0   3/21/22 at on arrival to ED. Acute CVA Core Measures:      - t-PA Eligibility: IV t-PA was considered and not given due to violations in inclusion criteria including stroke onset was greater than 3 hours prior to presentation and H&P less concnering for CVA          Procedures     MDM   Patient presented to the Emergency Department for near syncope . They are clinically stable, VSS, non toxic appearing. Differential broad and work up initiated. neurovasc in tact. H&P less concerning for CVA. No chest pain or anginal like symptoms. EKG reassuring and troponin. ACS less likely. Labs reassuring other than a mild transaminitits. No abdominal pain. Remained asymptomatic in the ED. Initially concerned possible orthostasis but symptoms can happen at rest as well. Shared decision making had and patient and wife requesting admission for further work up and requesting Dr. Geovanny Singh for admission.  With recurrent of symptoms and multiple episodes daily for the last few days and symptomatic, will admit for observation.               ----------------------------------------------- PAST HISTORY --------------------------------------------  Past Medical History:  has a past medical history of Alcohol abuse, BPH with elevated PSA, CAD (coronary artery disease), [facility] Chest pain, DJD (degenerative joint disease), Gastritis, GERD (gastroesophageal reflux disease), H/O exercise stress test, H/O exercise stress test, Hx of cardiovascular stress test, Hyperlipidemia, Hypertension, Hypothyroidism, PONV (postoperative nausea and vomiting), Thrombocytopenia ([address] and Thyroid disease. Past Surgical History:  has a past surgical history that includes Cardiac surgery (01/2011); Rotator cuff repair (Right, 12/2010); Diagnostic Cardiac Cath Lab Procedure (1/27/11); Knee arthroscopy (Right, 2006); Hand surgery (Left, 1978); Colonoscopy (8/4/11); Septoplasty (12/2015); Knee arthroscopy (Right, 11/14/2019); Knee arthroscopy (Right, 11/14/2019); Knee Arthroplasty; and Prostate biopsy (08/01/2021). Social History:  reports that he has never smoked. He has never used smokeless tobacco. He reports current alcohol use. He reports that he does not use drugs. Family History: family history includes Coronary Art Dis in his mother; Heart Surgery in his mother; Heart Surgery (age of onset: 76) in his father; High Blood Pressure in his father; High Cholesterol in his father; Hypertension in his brother, brother, father, mother, and sister; Other in his father; Thyroid Disease in his father. The patients home medications have been reviewed. Allergies: Patient has no known allergies.     ------------------------------------------------ RESULTS ---------------------------------------------------    LABS:  Results for orders placed or performed during the hospital encounter of 03/21/22   CBC with Auto Differential   Result Value Ref Range    WBC 6.8 4.5 - 11.5 E9/L    RBC 5.24 3.80 - 5.80 E12/L    Hemoglobin 16.9 (H) 12.5 - 16.5 g/dL    Hematocrit 47.6 37.0 - 54.0 %    MCV 90.8 80.0 - 99.9 fL    MCH 32.3 26.0 - 35.0 pg    MCHC 35.5 (H) 32.0 - 34.5 %    RDW 11.7 11.5 - 15.0 fL    Platelets 980 (L) 717 - 450 E9/L    MPV 11.1 7.0 - 12.0 fL    Neutrophils % 66.3 43.0 - 80.0 %    Immature Granulocytes % 0.4 0.0 - 5.0 %    Lymphocytes % 21.8 20.0 - 42.0 %    Monocytes % 10.5 2.0 - 12.0 %    Eosinophils % 0.6 0.0 - 6.0 %    Basophils % 0.4 0.0 - 2.0 %    Neutrophils Absolute 4.53 1.80 - 7.30 E9/L    Immature Granulocytes # 0.03 E9/L    Lymphocytes Absolute 1.49 (L) 1.50 - 4.00 E9/L    Monocytes Absolute 0.72 0.10 - 0.95 E9/L    Eosinophils Absolute 0.04 (L) 0.05 - 0.50 E9/L    Basophils Absolute 0.03 0.00 - 0.20 E9/L   Comprehensive Metabolic Panel w/ Reflex to MG   Result Value Ref Range    Sodium 142 132 - 146 mmol/L    Potassium reflex Magnesium 4.0 3.5 - 5.0 mmol/L    Chloride 101 98 - 107 mmol/L    CO2 26 22 - 29 mmol/L    Anion Gap 15 7 - 16 mmol/L    Glucose 97 74 - 99 mg/dL    BUN 11 6 - 23 mg/dL    CREATININE 0.7 0.7 - 1.2 mg/dL    GFR Non-African American >60 >=60 mL/min/1.73    GFR African American >60     Calcium 9.9 8.6 - 10.2 mg/dL    Total Protein 7.5 6.4 - 8.3 g/dL    Albumin 5.1 3.5 - 5.2 g/dL    Total Bilirubin 0.6 0.0 - 1.2 mg/dL    Alkaline Phosphatase 59 40 - 129 U/L    ALT 66 (H) 0 - 40 U/L    AST 43 (H) 0 - 39 U/L   Troponin   Result Value Ref Range    Troponin, High Sensitivity 7 0 - 11 ng/L   EKG 12 Lead   Result Value Ref Range    Ventricular Rate 52 BPM    Atrial Rate 52 BPM    P-R Interval 158 ms    QRS Duration 98 ms    Q-T Interval 456 ms    QTc Calculation (Bazett) 424 ms    P Axis 64 degrees    R Axis 30 degrees    T Axis 43 degrees       RADIOLOGY:    All Radiology results interpreted by Radiologist unless otherwise noted. CT Head WO Contrast   Final Result   No acute intracranial abnormality. EKG:  This EKG is signed and interpreted by ED Physician. Time:  1659   Rate: 52  Rhythm: Sinus. Interpretation: non-specific EKG. no STEMI. Normal axis. QTc 424.   Comparison: stable as compared to patient's most recent EKG.    ---------------------------- NURSING NOTES AND VITALS REVIEWED -------------------------   The nursing notes within the ED encounter and vital signs as below have been reviewed. BP (!) 144/91   Pulse 59   Temp 97.6 °F (36.4 °C)   Resp 16   SpO2 98%   Oxygen Saturation Interpretation: Normal      ------------------------------------------PROGRESS NOTES -------------------------------------------    ED COURSE MEDICATIONS:                Medications   0.9 % sodium chloride bolus (has no administration in time range)   amLODIPine (NORVASC) tablet 10 mg (has no administration in time range)   amLODIPine (NORVASC) tablet 2.5 mg (has no administration in time range)   aspirin chewable tablet 81 mg (has no administration in time range)   atenolol (TENORMIN) tablet 25 mg (has no administration in time range)   levothyroxine (SYNTHROID) tablet 50 mcg (has no administration in time range)   glucose (GLUTOSE) 40 % oral gel 15 g (has no administration in time range)   glucagon (rDNA) injection 1 mg (has no administration in time range)   dextrose 5 % solution (has no administration in time range)   sodium chloride flush 0.9 % injection 5-40 mL (has no administration in time range)   sodium chloride flush 0.9 % injection 5-40 mL (has no administration in time range)   0.9 % sodium chloride infusion (has no administration in time range)   enoxaparin (LOVENOX) injection 40 mg (has no administration in time range)   polyethylene glycol (GLYCOLAX) packet 17 g (has no administration in time range)   acetaminophen (TYLENOL) tablet 650 mg (has no administration in time range)     Or   acetaminophen (TYLENOL) suppository 650 mg (has no administration in time range)   dextrose bolus (hypoglycemia) 10% 125 mL (has no administration in time range)     Or   dextrose bolus (hypoglycemia) 10% 250 mL (has no administration in time range)       CONSULTATIONS:            Consultation:  I Spoke with Dr. Louie Dc  Discussed case. They will admit this patient. Lowell Alvarez PROCEDURES:            none.       COUNSELING:   I have spoken with the patient and discussed todays results, in addition to providing specific details for the plan of care and counseling regarding the diagnosis and prognosis.     --------------------------------------- IMPRESSION & DISPOSITION --------------------------------     IMPRESSION(s):  1. Near syncope        This patient's ED course included: a personal history and physicial examination, cardiac monitoring and continuous pulse oximetry    This patient has been closely monitored during their ED course. DISPOSITION:  Disposition: Admit to telemetry. Patient condition is stable. END OF PROVIDER NOTE.             Sotero Triana DO  Resident  03/21/22 0704

## 2022-03-21 NOTE — TELEPHONE ENCOUNTER
Patients wife Sreekanth Leighjulieta called asking to talk to Dr. Cornelius Tapia. She was informed  Was not in the office today. Still she wants  To call her she is taking pt to the hospital and she has questions.   Please advise    Sreekanth Levy [phone]    Last seen 2/16/2022  Next appt 5/10/2022

## 2022-03-21 NOTE — Clinical Note
Discharge Plan[de-identified] Other/[facility])   Telemetry/Cardiac Monitoring Required?: Yes

## 2022-03-22 ENCOUNTER — APPOINTMENT (OUTPATIENT)
Dept: CT IMAGING | Age: 62
End: 2022-03-22
Payer: COMMERCIAL

## 2022-03-22 VITALS
DIASTOLIC BLOOD PRESSURE: 78 MMHG | HEART RATE: 64 BPM | BODY MASS INDEX: 28.88 KG/M2 | TEMPERATURE: 97.4 F | OXYGEN SATURATION: 97 % | HEIGHT: 69 IN | WEIGHT: 195 LBS | SYSTOLIC BLOOD PRESSURE: 115 MMHG | RESPIRATION RATE: 16 BRPM

## 2022-03-22 PROBLEM — I10 ACCELERATED HYPERTENSION: Status: ACTIVE | Noted: 2022-03-22

## 2022-03-22 LAB
ALBUMIN SERPL-MCNC: 4.7 G/DL (ref 3.5–5.2)
ALP BLD-CCNC: 55 U/L (ref 40–129)
ALT SERPL-CCNC: 66 U/L (ref 0–40)
ANION GAP SERPL CALCULATED.3IONS-SCNC: 11 MMOL/L (ref 7–16)
AST SERPL-CCNC: 40 U/L (ref 0–39)
BASOPHILS ABSOLUTE: 0.04 E9/L (ref 0–0.2)
BASOPHILS RELATIVE PERCENT: 0.6 % (ref 0–2)
BILIRUB SERPL-MCNC: 0.8 MG/DL (ref 0–1.2)
BUN BLDV-MCNC: 11 MG/DL (ref 6–23)
CALCIUM SERPL-MCNC: 9.5 MG/DL (ref 8.6–10.2)
CHLORIDE BLD-SCNC: 100 MMOL/L (ref 98–107)
CO2: 25 MMOL/L (ref 22–29)
CORTISOL TOTAL: 13.72 MCG/DL (ref 2.68–18.4)
CREAT SERPL-MCNC: 0.8 MG/DL (ref 0.7–1.2)
EOSINOPHILS ABSOLUTE: 0.08 E9/L (ref 0.05–0.5)
EOSINOPHILS RELATIVE PERCENT: 1.1 % (ref 0–6)
FOLATE: >20 NG/ML (ref 4.8–24.2)
GFR AFRICAN AMERICAN: >60
GFR NON-AFRICAN AMERICAN: >60 ML/MIN/1.73
GLUCOSE BLD-MCNC: 83 MG/DL (ref 74–99)
HCT VFR BLD CALC: 47.9 % (ref 37–54)
HEMOGLOBIN: 17 G/DL (ref 12.5–16.5)
IMMATURE GRANULOCYTES #: 0.02 E9/L
IMMATURE GRANULOCYTES %: 0.3 % (ref 0–5)
LV EF: 63 %
LVEF MODALITY: NORMAL
LYMPHOCYTES ABSOLUTE: 1.77 E9/L (ref 1.5–4)
LYMPHOCYTES RELATIVE PERCENT: 25.4 % (ref 20–42)
MAGNESIUM: 2.2 MG/DL (ref 1.6–2.6)
MCH RBC QN AUTO: 32.6 PG (ref 26–35)
MCHC RBC AUTO-ENTMCNC: 35.5 % (ref 32–34.5)
MCV RBC AUTO: 91.8 FL (ref 80–99.9)
MONOCYTES ABSOLUTE: 0.71 E9/L (ref 0.1–0.95)
MONOCYTES RELATIVE PERCENT: 10.2 % (ref 2–12)
NEUTROPHILS ABSOLUTE: 4.35 E9/L (ref 1.8–7.3)
NEUTROPHILS RELATIVE PERCENT: 62.4 % (ref 43–80)
PDW BLD-RTO: 11.8 FL (ref 11.5–15)
PHOSPHORUS: 3.7 MG/DL (ref 2.5–4.5)
PLATELET # BLD: 141 E9/L (ref 130–450)
PMV BLD AUTO: 10.8 FL (ref 7–12)
POTASSIUM SERPL-SCNC: 3.7 MMOL/L (ref 3.5–5)
RBC # BLD: 5.22 E12/L (ref 3.8–5.8)
SODIUM BLD-SCNC: 136 MMOL/L (ref 132–146)
T4 FREE: 1.77 NG/DL (ref 0.93–1.7)
TOTAL CK: 115 U/L (ref 20–200)
TOTAL PROTEIN: 7.1 G/DL (ref 6.4–8.3)
TROPONIN, HIGH SENSITIVITY: 7 NG/L (ref 0–11)
TROPONIN, HIGH SENSITIVITY: 9 NG/L (ref 0–11)
TSH SERPL DL<=0.05 MIU/L-ACNC: 4.16 UIU/ML (ref 0.27–4.2)
VITAMIN B-12: 788 PG/ML (ref 211–946)
WBC # BLD: 7 E9/L (ref 4.5–11.5)

## 2022-03-22 PROCEDURE — 83735 ASSAY OF MAGNESIUM: CPT

## 2022-03-22 PROCEDURE — 6370000000 HC RX 637 (ALT 250 FOR IP): Performed by: INTERNAL MEDICINE

## 2022-03-22 PROCEDURE — 74178 CT ABD&PLV WO CNTR FLWD CNTR: CPT

## 2022-03-22 PROCEDURE — 6370000000 HC RX 637 (ALT 250 FOR IP): Performed by: NURSE PRACTITIONER

## 2022-03-22 PROCEDURE — 6360000004 HC RX CONTRAST MEDICATION: Performed by: RADIOLOGY

## 2022-03-22 PROCEDURE — 36415 COLL VENOUS BLD VENIPUNCTURE: CPT

## 2022-03-22 PROCEDURE — 80053 COMPREHEN METABOLIC PANEL: CPT

## 2022-03-22 PROCEDURE — 84484 ASSAY OF TROPONIN QUANT: CPT

## 2022-03-22 PROCEDURE — 84100 ASSAY OF PHOSPHORUS: CPT

## 2022-03-22 PROCEDURE — G0378 HOSPITAL OBSERVATION PER HR: HCPCS

## 2022-03-22 PROCEDURE — 82550 ASSAY OF CK (CPK): CPT

## 2022-03-22 PROCEDURE — 2580000003 HC RX 258: Performed by: INTERNAL MEDICINE

## 2022-03-22 PROCEDURE — 82533 TOTAL CORTISOL: CPT

## 2022-03-22 PROCEDURE — 82607 VITAMIN B-12: CPT

## 2022-03-22 PROCEDURE — 84443 ASSAY THYROID STIM HORMONE: CPT

## 2022-03-22 PROCEDURE — 85025 COMPLETE CBC W/AUTO DIFF WBC: CPT

## 2022-03-22 PROCEDURE — APPSS60 APP SPLIT SHARED TIME 46-60 MINUTES: Performed by: PHYSICIAN ASSISTANT

## 2022-03-22 PROCEDURE — 84439 ASSAY OF FREE THYROXINE: CPT

## 2022-03-22 PROCEDURE — 93306 TTE W/DOPPLER COMPLETE: CPT

## 2022-03-22 PROCEDURE — 82746 ASSAY OF FOLIC ACID SERUM: CPT

## 2022-03-22 RX ORDER — HYDRALAZINE HYDROCHLORIDE 25 MG/1
25 TABLET, FILM COATED ORAL EVERY 12 HOURS SCHEDULED
Qty: 60 TABLET | Refills: 0 | Status: SHIPPED | OUTPATIENT
Start: 2022-03-22 | End: 2022-04-04

## 2022-03-22 RX ORDER — HYDRALAZINE HYDROCHLORIDE 25 MG/1
25 TABLET, FILM COATED ORAL EVERY 12 HOURS SCHEDULED
Status: DISCONTINUED | OUTPATIENT
Start: 2022-03-22 | End: 2022-03-22 | Stop reason: HOSPADM

## 2022-03-22 RX ORDER — HYDROCHLOROTHIAZIDE 12.5 MG/1
12.5 TABLET ORAL EVERY MORNING
Status: DISCONTINUED | OUTPATIENT
Start: 2022-03-22 | End: 2022-03-22 | Stop reason: HOSPADM

## 2022-03-22 RX ORDER — LOSARTAN POTASSIUM 50 MG/1
100 TABLET ORAL EVERY MORNING
Status: DISCONTINUED | OUTPATIENT
Start: 2022-03-22 | End: 2022-03-22 | Stop reason: HOSPADM

## 2022-03-22 RX ORDER — AMLODIPINE BESYLATE 5 MG/1
2.5 TABLET ORAL DAILY
Status: DISCONTINUED | OUTPATIENT
Start: 2022-03-22 | End: 2022-03-22 | Stop reason: HOSPADM

## 2022-03-22 RX ORDER — OLMESARTAN MEDOXOMIL AND HYDROCHLOROTHIAZIDE 40/12.5 40; 12.5 MG/1; MG/1
1 TABLET ORAL EVERY MORNING
Status: DISCONTINUED | OUTPATIENT
Start: 2022-03-22 | End: 2022-03-22 | Stop reason: CLARIF

## 2022-03-22 RX ORDER — AMLODIPINE BESYLATE 5 MG/1
5 TABLET ORAL NIGHTLY
Status: DISCONTINUED | OUTPATIENT
Start: 2022-03-22 | End: 2022-03-22 | Stop reason: HOSPADM

## 2022-03-22 RX ADMIN — ATENOLOL 25 MG: 50 TABLET ORAL at 11:37

## 2022-03-22 RX ADMIN — SODIUM CHLORIDE, PRESERVATIVE FREE 10 ML: 5 INJECTION INTRAVENOUS at 11:38

## 2022-03-22 RX ADMIN — HYDRALAZINE HYDROCHLORIDE 25 MG: 25 TABLET, FILM COATED ORAL at 11:37

## 2022-03-22 RX ADMIN — ASPIRIN 81 MG 81 MG: 81 TABLET ORAL at 11:35

## 2022-03-22 RX ADMIN — LEVOTHYROXINE SODIUM 50 MCG: 0.05 TABLET ORAL at 11:35

## 2022-03-22 RX ADMIN — IOPAMIDOL 75 ML: 755 INJECTION, SOLUTION INTRAVENOUS at 10:09

## 2022-03-22 RX ADMIN — AMLODIPINE BESYLATE 2.5 MG: 5 TABLET ORAL at 11:35

## 2022-03-22 RX ADMIN — HYDROCHLOROTHIAZIDE 12.5 MG: 12.5 TABLET ORAL at 11:35

## 2022-03-22 RX ADMIN — LOSARTAN POTASSIUM 100 MG: 50 TABLET, FILM COATED ORAL at 11:35

## 2022-03-22 RX ADMIN — AMLODIPINE BESYLATE 10 MG: 5 TABLET ORAL at 00:46

## 2022-03-22 RX ADMIN — SODIUM CHLORIDE, PRESERVATIVE FREE 10 ML: 5 INJECTION INTRAVENOUS at 00:48

## 2022-03-22 NOTE — PROGRESS NOTES
Internal Medicine Progress Note    [facility]    Geeoz Edu. Getachew Vanegas., EDITA.NEGRITA.NIKOArsalanI. Darline Cartwright D.O., CHELSEAOArsalanIArsalan Momin D.O. Conrado Ormond, MSN, APRN, NP-C  Pushpa Purcell. Roxanne Arrington, MSN, APRN-CNP     Primary Care Physician: Vinnie Fagan MD   Admitting Physician:  Waleska Menard DO  Admission date and time: 3/21/2022  3:45 PM    Room:  [facility]5796-  Admitting diagnosis: Pre-syncope [R55]  Near syncope [R55]    Patient Name: Vito Oliva  MRN: [id number]    Date of Service: 3/22/2022     Subjective:  Alexus Chiang is a 60 y.o. male who was seen and examined today,3/22/2022, at the bedside. He is alert and pleasant and voiced numerous concerns about his intermittent hypertension. He states that it \"debilitates\" him during  episodes of hypertension. He states that he can hear his pulse in his ears and his head throbs. He also reports that he feels like he is going to faint when he lowers himself to the floor but never loses consciousness. He is well informed on his medication regimen and takes his blood pressure regularly. Wife present via speaker phone during exam.    Review of System:   Constitutional:   Denies fever or chills,  fatigue or malaise. Reports intentional weight loss via exercise. HEENT:   Denies ear pain, sore throat, sinus or eye problems. Cardiovascular:   Denies any chest pain, irregular heartbeats, or palpitations. Reports being able to hear his pulse and his head will throb. Also reports bouts of near syncope at times with significant hypertension   Respiratory:   Denies shortness of breath, coughing, sputum production, hemoptysis, or wheezing. Gastrointestinal:   Denies nausea, vomiting, diarrhea, or constipation. Denies any abdominal pain. Genitourinary:    Denies any urgency, frequency, hematuria. Voiding  without difficulty. Extremities:   Denies lower extremity swelling, edema or cyanosis.    Neurology:    Denies any headache or focal neurological deficits, Denies generalized weakness or memory difficulty. Near syncope as above  Psch:   Denies being anxious or depressed. Musculoskeletal:    Denies  myalgias, joint complaints or back pain. Integumentary:   Denies any rashes, ulcers, or excoriations. Denies bruising. Hematologic/Lymphatic:  Denies bruising or bleeding. Physical Exam:  No intake/output data recorded. Intake/Output Summary (Last 24 hours) at 3/22/2022 0909  Last data filed at 3/22/2022 0008  Gross per 24 hour   Intake 1500 ml   Output --   Net 1500 ml   I/O last 3 completed shifts: In: 1500 [P.O.:1500]  Out: -   No data found. Vital Signs:   Blood pressure 124/72, pulse 67, temperature 97.4 °F (36.3 °C), temperature source Oral, resp. rate 16, SpO2 97 %. General appearance:  Alert, responsive, oriented to person, place, and time. Somewhat anxious appearing, no distress. Head:  Normocephalic. No masses, lesions or tenderness. Eyes:  PERRLA. EOMI. Sclera clear. Buccal mucosa moist.  ENT:  Ears normal. Mucosa normal.  Neck:    Supple. Trachea midline. No thyromegaly. No JVD. No bruits. Heart:    Rhythm regular. Rate controlled. S1 and S2.  Lungs:    Symmetrical. Clear to auscultation bilaterally. No wheezes. No rhonchi. No rales. Abdomen:   Soft. Non-tender. Non-distended. Bowel sounds positive. No organomegaly or masses. No pain on palpation. Extremities:    Peripheral pulses present. No peripheral edema. No ulcers. No cyanosis. No clubbing. Neurologic:    Alert x 3. No focal deficit. Cranial nerves grossly intact. No focal weakness. Psych:   Behavior is normal. Mood appears normal. Speech is not rapid and/or pressured. Musculoskeletal:   Spine ROM normal. Muscular strength intact. Gait not assessed. Integumentary:  No rashes  Skin normal color and texture.   Genitalia/Breast:  Deferred    Medication:  Scheduled Meds:   losartan  100 mg Oral QAM    hydroCHLOROthiazide  12.5 mg Oral QAM    hydrALAZINE  25 mg Oral 2 times per day    sodium chloride  1,000 mL IntraVENous Once    amLODIPine  10 mg Oral Nightly    [Held by provider] amLODIPine  2.5 mg Oral QAM    aspirin  81 mg Oral Daily    [Held by provider] atenolol  25 mg Oral QAM    levothyroxine  50 mcg Oral Daily    sodium chloride flush  5-40 mL IntraVENous 2 times per day    enoxaparin  40 mg SubCUTAneous Daily     Continuous Infusions:   dextrose      sodium chloride         Objective Data:  CBC with Differential:    Lab Results   Component Value Date    WBC 7.0 03/22/2022    RBC 5.22 03/22/2022    HGB 17.0 03/22/2022    HCT 47.9 03/22/2022     03/22/2022    MCV 91.8 03/22/2022    MCH 32.6 03/22/2022    MCHC 35.5 03/22/2022    RDW 11.8 03/22/2022    SEGSPCT 56 10/24/2013    LYMPHOPCT 25.4 03/22/2022    MONOPCT 10.2 03/22/2022    BASOPCT 0.6 03/22/2022    MONOSABS 0.71 03/22/2022    LYMPHSABS 1.77 03/22/2022    EOSABS 0.08 03/22/2022    BASOSABS 0.04 03/22/2022     BMP:    Lab Results   Component Value Date     03/22/2022    K 3.7 03/22/2022    K 4.0 03/21/2022     03/22/2022    CO2 25 03/22/2022    BUN 11 03/22/2022    LABALBU 4.7 03/22/2022    LABALBU 4.5 08/04/2011    CREATININE 0.8 03/22/2022    CALCIUM 9.5 03/22/2022    GFRAA >60 03/22/2022    LABGLOM >60 03/22/2022    GLUCOSE 83 03/22/2022    GLUCOSE 82 08/04/2011     Assessment:  · Intermittent near syncope associated with accelerated hypertension  · Coronary artery disease status post stent placement with recent normal exercise stress/nuclear  · BPH with grade 1 prostate cancer  · Hyperlipidemia  · Hypothyroidism  · Alcohol abuse/dependence    Plan:   Siva Gunn is a 78-year-old male with complaints of intermittent hypertension. He has been symptomatic with this at times as described above. Problematic is his fluctuation in blood pressure as he is within normal range much of the day.   We have had extensive discussion at bedside with the patient and his wife via telephone in regards to potential medication adjustments. Norvasc was divided into twice daily dosing due to orthostasis and he is resistant to escalation in this. He has been taking intermittent dose clonidine at home of his own volition with some intermittent improvement and we have expressed our concern using this medication as such as it may result in rebound hypertension or tachycardia. Atenolol cannot be escalated as the patient states is resting heart rate is in the 50s to 60 bpm range. Due to this, after further discussion we have opted to add hydralazine to his regimen. 2D echo will be obtained along with a fasting cortisol level. The patient is wife request contrasted CT scan of the abdomen pelvis for further evaluation of adrenal abnormality. He has had a rather recent extensive outpatient work-up in regards to carotid ultrasound, stress test and no further cardiac testing will be performed aside from echocardiogram.  Depending upon results of the preliminary echocardiogram and response to medication adjustments, we will consider for discharge later today. Continue current therapy. See orders for further plan of care. More than 50% of my  time was spent at the bedside counseling/coordinating care with the patient and/or family with face to face contact. This time was spent reviewing notes and laboratory data as well as instructing and counseling the patient. Time I spent with the family or surrogate(s) is included only if the patient was incapable of providing the necessary information or participating in medical decisions. I also discussed the differential diagnosis and all of the proposed management plans with the patient and individuals accompanying the patient. Laketon Riddles requires this high level of physician care and nursing on the IMC/Telemetry unit due the complexity of decision management and chance of rapid decline or death.   Continued cardiac monitoring and higher level of nursing are required. I am readily available for any further decision-making and intervention.      Carlos Funez, TALI - CNP  3/22/2022  9:09 AM

## 2022-03-22 NOTE — ED NOTES
Report relayed to [facility], [place] Lyndsey Beverly RN  03/21/22 1506

## 2022-03-22 NOTE — PROGRESS NOTES
CLINICAL PHARMACY NOTE: MEDS TO BEDS    Total # of Prescriptions Filled: 1   The following medications were delivered to the patient:  · Hydralazine 25 mg    Additional Documentation:

## 2022-03-22 NOTE — CONSULTS
Inpatient [facility] Consultation      Reason for Consult: Near syncope    Consulting Physician: Dr. Ina Worthy    Requesting Physician: Dr. Thomas Tanner    Date of Consultation: 3/22/2022    HISTORY OF PRESENT ILLNESS:   Patient is a 60year old WM known to Dr. Payam Brown. He is being seen in consultation this hospital admission by Dr. Ina Worthy for evaluation and recommendations regarding near syncope. PMH: HTN, HLD, CAD s/p PCI/ROSE x 1 to the LAD in 2011, carotid artery disease, history of thrombocytopenia, hypothyroidism on replacement therapy, degenerative disc disease, gastritis/GERD, BPH, history of prostate cancer, alcohol abuse and chronically elevated LFTs. Patient presented to [address] [place] [address] on March 21, 2022 with complaints of dizziness and near syncope. Patient states that last Fall 2021, he noted of an episode of lightheadedness while exercising. He noted of near syncope at that time, but denied any complaints of chest pain, shortness of breath, palpitations or actual loss of consciousness. He states he was evaluated by his PCP, and underwent carotid ultrasound with no significant stenosis noted. He recently underwent exercise nuclear stress testing earlier this month by his primary cardiologist, which revealed no abnormalities. However, patient states that approximately one week ago this past Saturday, he was standing in his home talking on the phone with his son, when he suddenly began to feel lightheaded and near syncopal.  He denies the lightheadedness as being vertiginous in etiology. It did not occur after changing position. He additionally admitted to headache, and tinnitus. He also describes a sensation of \" his head feeling full\". He denies any focal neurological deficits. He denies any actual syncope or fall. The episode lasted a few minutes in total duration before spontaneous resolution.   He states he took his blood pressure at this time, and was noted to be elevated with systolics in the 398F, heart rate in the 50s (which is his typical baseline heart rate). He did not seek medical care at that time. He states he did well for a few days, and then noted of a recurrent episode this past Friday (4 days ago)  --> same as described above, except occurring at rest.  Again, he did not seek medical care, as the episode resolved within a few minutes. BP at that time was in the 380T systolic, heart rate 93B-82J. He again had another recurrent episode yesterday afternoon, and decided to come to the ED for further evaluation. He has had no further episodes since hospital admission. During these episodes of uncontrolled hypertension, patient did take as needed clonidine that is prescribed to him by his primary cardiologist.  He admits to medication compliance. He denies chest discomfort at rest or on exertion, shortness of breath at rest or on exertion, palpitations, nausea, emesis, vision changes or diaphoresis. Denies peripheral edema, PND, orthopnea. Please note: past medical records were reviewed per electronic medical record (EMR) - see detailed reports under Past Medical/ Surgical History. CARDIOVASCULAR HISTORY:   HTN. HLD, on statin therapy. Coronary artery disease. 1/26/11: Admitted with chest pain suggestive of unstable angina. 1/26/11: Treadmill nuclear stress test: Srinivasan protocol. 8 minutes and 30 seconds. 95% of maximum predicted heart rate. Physiologic blood pressure response. Patient developed chest pain at 7 ½ minutes into exercise. He did have around 2 mm ST segment depressions. Nuclear images were read as showing no evidence of stress-induced ischemia. 1/27/11: Cardiac catheterization and angioplasty: Left main: No significant disease. LAD: 95% subtotal occlusion in the proximal third of the vessel. Intermediate ramus: No significant disease. LCX: No significant disease. RCA: No significant disease. Normal left ventricular size and systolic function. Elevated left ventricular end-diastolic pressure suggestive of diastolic dysfunction. Mild peak-to-peak gradient across the aortic valve on pullback. Successful balloon angioplasty with deployment of a drug-eluting coronary stent to the LAD with excellent results with adjunctive Angiomax therapy. 1/3/2017: Treadmill nuclear stress test: Srinivasan protocol. 10 minutes. 101% of the maximum predicted heart rate. Hypertensive blood pressure response. No chest pain. No ischemic EKG changes and no arrhythmias. Nuclear images were within normal limits showing mild diaphragmatic attenuation artifact with no convincing evidence of any scars or any stress induced ischemia with the gated views showing no regional wall motion abnormality with a calculated ejection fraction of 76%. Treadmill nuclear stress test done at [facility] done on 10/29/2019, at which time he reportedly achieved 100% of the maximum predicted heart rate, which showed no evidence of stress-induced ischemia with a calculated ejection fraction of 82%. Treadmill nuclear stress test (3/1/2022): Completed 9 minutes of exercise reaching 10 METS, 100% MPHR. No chest pain with exercise. No ST changes with exercise. Exercise EKG was normal.  MPI was normal.  LVEF 64%, no WMA. Exercise capacity was above average with low risk a general exercise nuclear stress test.  Carotid artery disease. Ultrasound 10/2021 revealing less than 50% stenosis bilateral internal carotid arteries. TTE (Dr. Pawan Rangel, 2011): No significant valvular abnormalities. Stage I diastolic dysfunction. Normal LV size and function, EF 61%. PAST MEDICAL HISTORY:  As under cardiovascular history. Thrombocytopenia. Hypothyroidism, on replacement therapy. DJD. Status post right knee arthroscopy in 2006. Status post surgical repair of left hand fracture in 1978. Status post repair of a torn right rotator cuff in 12/10. Tubular adenoma on colonoscopy in 2007.   Repeat colonoscopy on 11 was normal.  Colonoscopy 2017: Small hyperplastic rectal polyps. One of them was sampled:  Reported negative pathology. Next examination in 10 years. Gastritis on EGD in 2/10. Alcohol abuse: Patient drinks 8-10 beers daily. History of deviated nasal septum, S/P surgery in 2015. Left arm biceps partial tear (after lifting/moving a heavy TV set) on MRI done on 2018. Right knee arthroscopy with tri compartmental synovectomy, chondroplasty of MFC/patella/LFC on 2019. Right knee injection with [place]ill, 2019. Vitamin D deficiency. History of prostate cancer. Chronically elevated hepatocellular enzymes. FAMILY HISTORY:  Mother living in her mid to late [de-identified], has coronary artery disease and is status post stenting. She has hypertension and diabetes mellitus. Father  at age 76 from post-operative complications post abdominal aortic aneurysm repair: Had hypertension and hypothyroidism. SOCIAL HISTORY:  Patient does not smoke. He drinks 8-10 beers a day. 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 PAST SURGICAL HISTORY:    Past Surgical History:   Procedure Laterality Date    CARDIAC SURGERY  2011    PTCA with stent, LAD    COLONOSCOPY  11    normal    DIAGNOSTIC CARDIAC CATH LAB PROCEDURE  11    Normal LV size and systolic function. Elevated LVEDP suggestive of diastolic dysfunction. Mild peak-to-peak gradient across the AV on pullback.  Successful balloon angioplasty with deployment of ROSE to LAD with excellent results with adjunctive Angiomax therapy     HAND SURGERY Left     fracture    KNEE ARTHROPLASTY      KNEE ARTHROSCOPY Right     KNEE ARTHROSCOPY Right 2019    medial meniscectomy and debridement     KNEE ARTHROSCOPY Right 2019    RIGHT KNEE ARTHROSCOPY AND DEBRIDEMENT, CHONDROPLASTY AND SYNOVECTOMY performed by Alejandro Kessler DO at [address]  2021    ROTATOR CUFF REPAIR Right Geno Art, DO    0.9 % sodium chloride bolus, 1,000 mL, IntraVENous, Once, Geno Art, DO    amLODIPine (NORVASC) tablet 10 mg, 10 mg, Oral, Nightly, Ismail U Dyan, DO, 10 mg at 03/22/22 0046    amLODIPine (NORVASC) tablet 2.5 mg, 2.5 mg, Oral, QAM, Ismail U Dyan, DO    aspirin chewable tablet 81 mg, 81 mg, Oral, Daily, Ismail U Dyan, DO    atenolol (TENORMIN) tablet 25 mg, 25 mg, Oral, QAM, Ismail U Dyan, DO    levothyroxine (SYNTHROID) tablet 50 mcg, 50 mcg, Oral, Daily, Ismail U Dyan, DO    glucose (GLUTOSE) 40 % oral gel 15 g, 15 g, Oral, PRN, Ismail U Dyan, DO    glucagon (rDNA) injection 1 mg, 1 mg, IntraMUSCular, PRN, Ismail U Dyan, DO    dextrose 5 % solution, 100 mL/hr, IntraVENous, PRN, Ismail U Dyan, DO    sodium chloride flush 0.9 % injection 5-40 mL, 5-40 mL, IntraVENous, 2 times per day, Geno Art, DO, 10 mL at 03/22/22 0048    sodium chloride flush 0.9 % injection 5-40 mL, 5-40 mL, IntraVENous, PRN, Ismail U Dyan, DO    0.9 % sodium chloride infusion, 25 mL, IntraVENous, PRN, Ismail U Dyan, DO    enoxaparin (LOVENOX) injection 40 mg, 40 mg, SubCUTAneous, Daily, Ismail U Dyan, DO    polyethylene glycol (GLYCOLAX) packet 17 g, 17 g, Oral, Daily PRN, Geno Art, DO    acetaminophen (TYLENOL) tablet 650 mg, 650 mg, Oral, Q6H PRN **OR** acetaminophen (TYLENOL) suppository 650 mg, 650 mg, Rectal, Q6H PRN, Ismail U Dyan, DO    dextrose bolus (hypoglycemia) 10% 125 mL, 125 mL, IntraVENous, PRN **OR** dextrose bolus (hypoglycemia) 10% 250 mL, 250 mL, IntraVENous, PRN, Geno Art, DO      ALLERGIES:  Patient has no known allergies. REVIEW OF SYSTEMS:     Negative except as noted above in HPI. PHYSICAL EXAM:   /72   Pulse 67   Temp 97.4 °F (36.3 °C) (Oral)   Resp 16   SpO2 97%   CONST:  Well developed, well nourished WM who appears stated age. Awake, alert, cooperative, no apparent distress. HEENT:   Head- Normocephalic, atraumatic.    Eyes- Conjunctivae pink, anicteric. Neck-  No stridor, trachea midline, no apparent jugular venous distention. CHEST: Chest symmetrical and non-tender to palpation. No accessory muscle use or intercostal retractions. RESPIRATORY: Lung sounds - clear throughout fields. No wheezing, rales or rhonchi. CARDIOVASCULAR:     No noted carotid bruit. Heart Ausculation- Regular rate and rhythm, no significant murmur appreciated. PV: No lower extremity edema. Pedal pulses palpable, no clubbing or cyanosis. ABDOMEN: Soft, non-tender to light palpation. Bowel sounds present. MS: Good muscle strength and tone. No atrophy or abnormal movements. SKIN: Warm and dry. NEURO / PSYCH: Oriented to person, place and time. Speech clear and appropriate. Follows all commands. Pleasant affect. DATA:    Telemetry: NSR with HR in the 70s    Diagnostic:  All diagnostic testing and lab work thus far this admission reviewed in detail. CT Head 3/21/2022:  Impression:  No acute intracranial abnormality. CT Abdomen/Pelvis 3/22/2022:  Impression:  Normal appearance of bilateral adrenal glands. Sclerosing mesenteritis, nonspecific. Calcified atherosclerotic disease seen at the origin of bilateral renal  arteries. Further evaluation may be obtained with renal aortic ratio  ultrasound if clinically warranted. No acute findings in the abdomen or pelvis.       Intake/Output Summary (Last 24 hours) at 3/22/2022 0845  Last data filed at 3/22/2022 0008  Gross per 24 hour   Intake 1500 ml   Output --   Net 1500 ml       Labs:   CBC:   Recent Labs     03/21/22  1706 03/22/22  0650   WBC 6.8 7.0   HGB 16.9* 17.0*   HCT 47.6 47.9   * 141     BMP:   Recent Labs     03/21/22  1706 03/22/22  0650    136   K 4.0 3.7   CO2 26 25   BUN 11 11   CREATININE 0.7 0.8   LABGLOM >60 >60   CALCIUM 9.9 9.5     Mag:   Recent Labs     03/22/22  0650   MG 2.2     Phos:   Recent Labs     03/22/22  0650   PHOS 3.7     TSH:   Recent Labs 03/22/22  0650   TSH 4.160     HgA1c:   Lab Results   Component Value Date    LABA1C 4.9 04/01/2021     CARDIAC ENZYMES:  Recent Labs     03/22/22  0054   CKTOTAL 115     FASTING LIPID PANEL:  Lab Results   Component Value Date    CHOL 215 10/19/2021    HDL 59 10/19/2021    LDLCALC 120 10/19/2021    TRIG 178 10/19/2021     LIVER PROFILE:  Recent Labs     03/21/22  1706 03/22/22  0650   AST 43* 40*   ALT 66* 66*   LABALBU 5.1 4.7      Ref Range & Units 03/21/22 1706   Troponin, High Sensitivity 0 - 11 ng/L 7       Ref Range & Units 03/22/22 0054   Total CK 20 - 200 U/L 115        ASSESSMENT:  Recurrent episodes of lightheadedness with near syncope. Associated symptoms of headache, tinnitus. Possibly in setting of intermittent episodes of severely uncontrolled hypertension as reported by the patient. Head CT negative for acute intracranial abnormality this admission. Abdomen/Pelvis CT this admission revealing calcified atherosclerotic disease at the origin of bilateral renal arteries, with normal caliber abdominal aorta. Orthostatic vital signs negative. No bradyarrhythmias or tachyarrhythmias noted on telemetry thus far. HTN, intermittently uncontrolled. HLD, on statin therapy. Chronically elevated LFTs. History of thrombocytopenia. Hypothyroidism, on replacement therapy. GERD. Gastritis. Alcohol abuse. BPH. History of prostate cancer. RECOMMENDATIONS:  Trend troponin. Echocardiogram for evaluation of LV/RV function -- pending. Abdomen/Pelvis CT reviewed. Recommend outpatient nephrology evaluation for possible renal artery stenosis. Currently undergoing evaluation of secondary causes of uncontrolled hypertension on multiple antihypertensive therapies. Recent exercise nuclear stress testing reviewed. Recommend 14-day outpatient event monitor upon discharge. Alcohol cessation advised. Outpatient sleep study recommended to patient.   Hydralazine has been added for more optimal blood pressure control. Advise discontinuing Clonidine due to risk of rebound HTN. Continue other cardiac medications same at this time. Okay for discharge from a cardiology standpoint. Follow up outpatient in 4-6 weeks. The above case and recommendations have been discussed and made in collaboration with Dr. Ravindra Green. NOTE: This report was transcribed using voice recognition software. Every effort was made to ensure accuracy; however, inadvertent computerized transcription errors may be present. Andrei Velazquez, [address] [place] [address], [place] [address] Cardiology    Electronically signed by Thomas Benjamin PA-C on 3/22/2022 at 8:45 AM       Patient seen and examined and Case discussed in detail with cardiology nurse practitioner/physician assistant and I agree with the H&P and A&P discussed in detail and documented above. I reviewed the patient chart and talked to the patient at length and reviewed all lab and imaging as well as medication and contributed more than 50% of the care discussed and documented above.

## 2022-03-22 NOTE — CARE COORDINATION
3/22/2022 1244 CM note: No covid testing. Pt out of room for testing. Per chart review  and IDR, pt is independent with ADLs and resides with his wife. PCP is Dr Libby Wood. Plan is for pt to return home at [place].  Miguel Angel SCHWARTZ

## 2022-03-22 NOTE — ED NOTES
Pt taken to floor at this time. Pt is A&OX4 , has all belongings and is in stable condition upon leaving ED.       Anika Stovall RN  03/21/22 8607

## 2022-03-22 NOTE — DISCHARGE SUMMARY
Internal Medicine Progress Note     [facility]     Siria Gama. Jamarcus Strong, ANDRY Malave D.O., KRYSTA Guzmán, MSN, APRN, NP-C  Agsutín Gandara. Mague Mayorga, MSN, APRN-CNP       Internal Medicine  Discharge Summary    NAME: Kanchan Miles  :  1960  MRN:  [id number]  Jacob Marsh MD  ADMITTED: 3/21/2022      DISCHARGED: 3/22/22    ADMITTING PHYSICIAN: Siria Canseco DO    CONSULTANT(S):   IP CONSULT TO CARDIOLOGY     ADMITTING DIAGNOSIS:   Pre-syncope [R55]  Near syncope [R55]     DISCHARGE DIAGNOSES:   · Intermittent near syncope associated with accelerated hypertension  · Coronary artery disease status post stent placement with recent normal exercise stress/nuclear  · BPH with grade 1 prostate cancer  · Hyperlipidemia  · Hypothyroidism  · Alcohol abuse/dependence      BRIEF HISTORY OF PRESENT ILLNESS:   Patient is a 78-year-old male who presented to the ED due to  Burgess Janessa. Patient states that this started about a week ago on Saturday. States that initially his first episode occurred while standing. He felt his hands tingling, he felt fullness to his head, and he felt lightheaded and dizzy, he felt malaise. He did develop a slight headache as well. He checked his blood pressure and it was high. His heart rate was in the 50s. He denies any associated chest pain or palpitation. Denies any shortness of breath. He continued to have episodes. He also had episode earlier today while sitting. As such he presented to the ED for further evaluation and management.     LABS[de-identified]  Lab Results   Component Value Date    WBC 7.0 2022    HGB 17.0 (H) 2022    HCT 47.9 2022     2022     2022    K 3.7 2022     2022    CREATININE 0.8 2022    BUN 11 2022    CO2 25 2022    GLUCOSE 83 2022    ALT 66 (H) 2022    AST 40 (H) 2022 INR 0.9 06/17/2012     Lab Results   Component Value Date    INR 0.9 06/17/2012    INR 1.4 01/27/2011    PROTIME 11.6 06/17/2012    PROTIME 14.7 (H) 01/27/2011      Lab Results   Component Value Date    TSH 4.160 03/22/2022     Lab Results   Component Value Date    TRIG 178 (H) 10/19/2021    TRIG 162 (H) 04/01/2021    TRIG 71 05/03/2019     Lab Results   Component Value Date    HDL 59 10/19/2021    HDL 58 04/01/2021    HDL 59 09/19/2020     Lab Results   Component Value Date    LDLCALC 120 (H) 10/19/2021    LDLCALC 116 (H) 04/01/2021    LDLCALC 114 (H) 09/19/2020     Lab Results   Component Value Date    LABA1C 4.9 04/01/2021       IMAGING:  CT ABDOMEN PELVIS W WO CONTRAST Additional Contrast? None    Result Date: 3/22/2022  EXAMINATION: CT OF THE ABDOMEN AND PELVIS WITH AND WITHOUT CONTRAST 3/22/2022 9:50 am TECHNIQUE: CT of the abdomen and pelvis was performed with and without the administration of intravenous contrast.  Multiplanar reformatted images are provided for review. Dose modulation, iterative reconstruction, and/or weight based adjustment of the mA/kV was utilized to reduce the radiation dose to as low as reasonably achievable. COMPARISON: Retroperitoneal ultrasound February 1, 2013 HISTORY: ORDERING SYSTEM PROVIDED HISTORY: Adrenal protocol, severe episodic hypertension TECHNOLOGIST PROVIDED HISTORY: Reason for exam:->Adrenal protocol, severe episodic hypertension Additional Contrast?->None FINDINGS: Lung Bases: Bilateral compressive atelectasis. Liver: No hepatic lesions identified. Bile ducts:  Gallbladder is unremarkable. No evidence of intrahepatic or extrahepatic biliary dilatation. Pancreas: No pancreatic lesions. The pancreatic duct is not dilated. Adrenal glands: Normal in appearance. Kidneys: No evidence of hydronephrosis. No abnormal renal lesions. Spleen: No enhancing lesions. Bowel: No evidence of bowel obstruction. Appendix is normal appearance.  Vessels: Abdominal aorta is normal in caliber. There are calcified atherosclerotic disease seen at the ostium of the bilateral renal arteries. Peritoneum/Retroperitoneum: No abnormal pelvic lymphadenopathy. Increased sclerosis of the left abdominal mesenteric fat. Pelvis: Bladder is unremarkable. Bones/Soft tissues: No aggressive osseous lesions     Normal appearance of bilateral adrenal glands. Sclerosing mesenteritis, nonspecific. Calcified atherosclerotic disease seen at the origin of bilateral renal arteries. Further evaluation may be obtained with renal aortic ratio ultrasound if clinically warranted. No acute findings in the abdomen or pelvis. CT Head WO Contrast    Result Date: 3/21/2022  EXAMINATION: CT OF THE HEAD WITHOUT CONTRAST  3/21/2022 5:56 pm TECHNIQUE: CT of the head was performed without the administration of intravenous contrast. Dose modulation, iterative reconstruction, and/or weight based adjustment of the mA/kV was utilized to reduce the radiation dose to as low as reasonably achievable. COMPARISON: None. HISTORY: ORDERING SYSTEM PROVIDED HISTORY: near syncope TECHNOLOGIST PROVIDED HISTORY: Reason for exam:->near syncope Has a \"code stroke\" or \"stroke alert\" been called? ->No Decision Support Exception - unselect if not a suspected or confirmed emergency medical condition->Emergency Medical Condition (MA) FINDINGS: BRAIN/VENTRICLES: There is no acute intracranial hemorrhage, mass effect or midline shift. No abnormal extra-axial fluid collection. The gray-white differentiation is maintained without evidence of an acute infarct. There is no evidence of hydrocephalus. ORBITS: The visualized portion of the orbits demonstrate no acute abnormality. SINUSES: The visualized paranasal sinuses and mastoid air cells demonstrate no acute abnormality. SOFT TISSUES/SKULL:  No acute abnormality of the visualized skull or soft tissues. No acute intracranial abnormality. HOSPITAL COURSE:   Well throughout hospitalization.   He presented last evening due to accelerated hypertension with symptoms of near syncope, headache and a sensation that he could hear his pulse in his ears. He had been taking clonidine intermittently at home while monitoring his blood pressure and we have counseled him against this as this may cause rebound hypertension and tachycardia. Given the lability of his blood pressure readings, and his prior intolerances to higher dose Norvasc and his relative resting bradycardia, we have discussed the institution of low-dose hydralazine which she is agreeable with. This was instituted in the hospital and was well-tolerated. Extensive work-up has been undertaken including urine metanephrine, CT scan of the abdomen pelvis demonstrated no adrenal mass or abnormalities and an echocardiogram that did not show any significant abnormalities on preliminary report. Perla Licona himself is an RN and is very understanding of sodium restrictions, medication adherence and close follow-up. We have reinforced the need to take medications as prescribed, and have discontinued the clonidine. He will follow closely with his primary care provider. His wife was updated extensively via telephone this morning. He is acceptable for discharge home as discussed. BRIEF PHYSICAL EXAMINATION AND LABORATORIES ON DAY OF DISCHARGE:  VITALS:  /72   Pulse 67   Temp 97.4 °F (36.3 °C) (Oral)   Resp 16   Ht 5' 9\" (1.753 m)   Wt 195 lb (88.5 kg)   SpO2 97%   BMI 28.80 kg/m²     HEENT:  PERRLA. EOMI. Sclera clear. Buccal mucosa moist.    Neck:  Supple. Trachea midline. No thyromegaly. No JVD. No bruits. Heart:  Rhythm regular, rate controlled. S1-S2    Lungs:  Symmetrical. Clear to auscultation bilaterally. No wheezes. No rhonchi. No rales. Abdomen: Soft. Non-tender. Non-distended. Bowel sounds positive. No organomegaly or masses. No pain on palpation    Extremities:  Peripheral pulses present. No peripheral edema.   No ulcers. Neurologic:  Alert x 3. No focal deficit. Cranial nerves grossly intact. Skin:  No petechia. No hemorrhage. No wounds. DISPOSITION:  The patient's condition is good. At this time the patient is without objective evidence of an acute process requiring continuing hospitalization or inpatient management. They are stable for discharge with outpatient follow-up. I have spoken with the patient and discussed the results of the current hospitalization, in addition to providing specific details for the plan of care and counseling regarding the diagnosis and prognosis. The plan has been discussed in detail and they are aware of the specific conditions for emergent return, as well as the importance of follow-up.   Their questions are answered at this time and they are agreeable with the plan for discharge to home    DISCHARGE MEDICATIONS:   Current Discharge Medication List           Details   hydrALAZINE (APRESOLINE) 25 MG tablet Take 1 tablet by mouth every 12 hours  Qty: 60 tablet, Refills: 0              Details   levothyroxine (SYNTHROID) 50 MCG tablet Take 1 tablet by mouth daily  Qty: 90 tablet, Refills: 1    Associated Diagnoses: Essential hypertension      !! amLODIPine (NORVASC) 5 MG tablet Take 1 tablet by mouth nightly  Qty: 90 tablet, Refills: 1    Associated Diagnoses: Essential hypertension      !! amLODIPine (NORVASC) 2.5 MG tablet Take 1 tablet by mouth daily  Qty: 90 tablet, Refills: 1      rosuvastatin (CRESTOR) 20 MG tablet Take 1 tablet by mouth every other day  Qty: 90 tablet, Refills: 1      atenolol (TENORMIN) 25 MG tablet Take 1 tablet by mouth daily  Qty: 90 tablet, Refills: 1      olmesartan-hydroCHLOROthiazide (BENICAR HCT) 40-12.5 MG per tablet Take 1 tablet by mouth daily  Qty: 90 tablet, Refills: 1      Coenzyme Q10 (CO Q 10) 100 MG CAPS Take by mouth daily       aspirin 81 MG tablet Take 81 mg by mouth daily       Cholecalciferol (VITAMIN D) 2000 UNITS CAPS capsule Take 1,000 Units by mouth daily       Multiple Vitamins-Minerals (MULTIVITAL-M PO) Take  by mouth. Shaklee vits       ! ! - Potential duplicate medications found. Please discuss with provider. FOLLOW UP/INSTRUCTIONS:  · This patient is instructed to follow-up with his primary care physician. · Patient is instructed to follow-up with the consults listed above as directed by them. · he is instructed to resume home medications and take new medications as indicated in the list above. · If the patient has a recurrence of symptoms, he is instructed to go to the ED. Preparing for this patient's discharge, including paperwork, orders, instructions, and meeting with patient did require > 40 minutes.     TALI Ponce - ES     3/22/2022  1:40 PM

## 2022-03-22 NOTE — PROGRESS NOTES
Comprehensive Nutrition Assessment    Type and Reason for Visit:  Initial,Positive Nutrition Screen    Nutrition Recommendations/Plan: Start Ensure HP BID. Will continue to monitor. Nutrition Assessment:  Pt adm d/t Intermittent near syncope associated w/ accelerated HTN. Note ETOH abuse, prostate CA, CAD. Pt currently ungoing CT. Will continue to monitor & provide ONS. Malnutrition Assessment:  Malnutrition Status: At risk for malnutrition (Comment)    Context:  Chronic Illness     Findings of the 6 clinical characteristics of malnutrition:  Energy Intake:  Mild decrease in energy intake (Comment)  Weight Loss:  Unable to assess (UTO updated measured wt)     Body Fat Loss:  Unable to assess (testing)     Muscle Mass Loss:  Unable to assess (testing)    Fluid Accumulation:  No significant fluid accumulation     Strength:  Not Performed    Estimated Daily Nutrient Needs:  Energy (kcal):  3706-4407; Weight Used for Energy Requirements:  Current     Protein (g):  ; Weight Used for Protein Requirements:  Ideal (1.3-1.5)        Fluid (ml/day):  6856-4957; Method Used for Fluid Requirements:  1 ml/kcal      Nutrition Related Findings:  A&O, abd WNL, no noted edema, +I/Os      Wounds:  None       Current Nutrition Therapies:    ADULT DIET; Regular    Anthropometric Measures:  · Height: 5' 9\" (175.3 cm)  · Current Body Weight: 195 lb (88.5 kg) (3/22 no method, UTO updated measured wt, pt in testing)   · Usual Body Weight:  (lack measured wt hx per EMR)     · Ideal Body Weight: 160 lbs; % Ideal Body Weight 121.9 %   · BMI: 28.8  · BMI Categories: Overweight (BMI 25.0-29. 9)       Nutrition Diagnosis:   · Inadequate oral intake related to psychological cause or life stress (ETOH abuse) as evidenced by poor intake prior to admission    Nutrition Interventions:   Food and/or Nutrient Delivery:  Continue Current Diet,Start Oral Nutrition Supplement (Ensure HP BID)  Nutrition Education/Counseling:  Education not indicated   Coordination of Nutrition Care:  Continue to monitor while inpatient    Goals:  Pt consumes >50% meals/ONS       Nutrition Monitoring and Evaluation:   Behavioral-Environmental Outcomes:  None Identified   Food/Nutrient Intake Outcomes:  Food and Nutrient Intake,Supplement Intake  Physical Signs/Symptoms Outcomes:  Biochemical Data,Nutrition Focused Physical Findings,Skin,Weight,Chewing or Swallowing,GI Status,Fluid Status or Edema     Discharge Planning:     Too soon to determine     Electronically signed by Roman Landrum MS, RD, LD on 3/22/22 at 12:46 PM EDT    Contact: [phone]

## 2022-03-22 NOTE — H&P
Department of Internal Medicine  History and Physical    PCP: Jerry Limon MD  Admitting Physician: Dr. Braxton Peñaloza  Consultants:   Date of Service: 3/21/2022    CHIEF COMPLAINT: Near syncope    HISTORY OF PRESENT ILLNESS:    Patient is a 78-year-old male who presented to the ED due to  Burgess Janessa. Patient states that this started about a week ago on Saturday. States that initially his first episode occurred while standing. He felt his hands tingling, he felt fullness to his head, and he felt lightheaded and dizzy, he felt malaise. He did develop a slight headache as well. He checked his blood pressure and it was high. His heart rate was in the 50s. He denies any associated chest pain or palpitation. Denies any shortness of breath. He continued to have episodes. He also had episode earlier today while sitting. As such he presented to the ED for further evaluation and management. PAST MEDICAL Hx:  Past Medical History:   Diagnosis Date    Alcohol abuse     drinks 8-10 beers daily    BPH with elevated PSA     CAD (coronary artery disease)     Cancer (HCC) 08/01/2021    Grade 1 prostrate cancer     Chest pain 1/26/11    suggestive of unstable angina    DJD (degenerative joint disease)     right knee     Gastritis 2/10    on EGD    GERD (gastroesophageal reflux disease)     H/O exercise stress test 1/26/11    Treadmill nuclear stress test: Srinivasan protocol. 8 min, 30 sec. 95% max predicted heart rate. Physiologic BP response. Patient developed chest pain at 7 1/2 min into exercise. Had around 2 mm ST segment depressions. Nuclear images read as showing no evidence of stress-induced ischemia    H/O exercise stress test 9/9/11    Treadmill nuclear stress test: Srinivasan protocol. 9 min and 30 sec. 101% of max predicted heart rate. Hypertensive BP response. No CP. No ischemic EKG changes. Nuclear images showed soft tissue attenuation artifacts.  There did not appear to be any evidence of stress-induced ischemia on study. Gated views did not show any regional wall motion abnormality with possible hyperdynamic LVSF, EF 77%     Hx of cardiovascular stress test 10/29/2019    Nuclear treadmill stress test    Hyperlipidemia     Hypertension     Hypothyroidism     PONV (postoperative nausea and vomiting)     with anesthesia    Thrombocytopenia (HCC)     Thyroid disease        PAST SURGICAL Hx:   Past Surgical History:   Procedure Laterality Date    CARDIAC SURGERY  2011    PTCA with stent, LAD    COLONOSCOPY  11    normal    DIAGNOSTIC CARDIAC CATH LAB PROCEDURE  11    Normal LV size and systolic function. Elevated LVEDP suggestive of diastolic dysfunction. Mild peak-to-peak gradient across the AV on pullback. Successful balloon angioplasty with deployment of ROSE to LAD with excellent results with adjunctive Angiomax therapy     HAND SURGERY Left     fracture    KNEE ARTHROPLASTY      KNEE ARTHROSCOPY Right     KNEE ARTHROSCOPY Right 2019    medial meniscectomy and debridement     KNEE ARTHROSCOPY Right 2019    RIGHT KNEE ARTHROSCOPY AND DEBRIDEMENT, CHONDROPLASTY AND SYNOVECTOMY performed by Don Ott DO at [place] [address]  2021    ROTATOR CUFF REPAIR Right 2010    SEPTOPLASTY  2015       FAMILY Hx:  Family History   Problem Relation Age of Onset    Coronary Art Dis Mother         presently 80     Heart Surgery Mother     Hypertension Mother     Heart Surgery Father 76        Aortic aneurysm  post op     Hypertension Father     Thyroid Disease Father     Other Father         adbominal aortic aneurysm     High Blood Pressure Father     High Cholesterol Father     Hypertension Sister     Hypertension Brother     Hypertension Brother        HOME MEDICATIONS:  Prior to Admission medications    Medication Sig Start Date End Date Taking?  Authorizing Provider   levothyroxine (SYNTHROID) 50 MCG tablet Take 1 tablet by mouth daily 2/16/22   Samia Qureshi MD   amLODIPine (NORVASC) 5 MG tablet Take 1 tablet by mouth nightly  Patient taking differently: Take 10 mg by mouth nightly  2/16/22   Samia Qureshi MD   amLODIPine (NORVASC) 2.5 MG tablet Take 1 tablet by mouth daily  Patient taking differently: Take 2.5 mg by mouth every morning  1/5/22   Rod Jaramillo MD   rosuvastatin (CRESTOR) 20 MG tablet Take 1 tablet by mouth every other day  Patient taking differently: Take 20 mg by mouth at bedtime  12/7/21   Rod Jaramillo MD   atenolol (TENORMIN) 25 MG tablet Take 1 tablet by mouth daily  Patient taking differently: Take 25 mg by mouth every morning  12/7/21   Rod Jaramillo MD   cloNIDine (CATAPRES) 0.1 MG tablet Take 1 tablet by mouth as needed for High Blood Pressure 12/6/21   Amita White MD   olmesartan-hydroCHLOROthiazide (BENICAR HCT) 40-12.5 MG per tablet Take 1 tablet by mouth daily  Patient taking differently: Take 1 tablet by mouth every morning  11/19/21   Rod Jaramillo MD   Coenzyme Q10 (CO Q 10) 100 MG CAPS Take by mouth daily     Historical Provider, MD   aspirin 81 MG tablet Take 81 mg by mouth daily     Historical Provider, MD   Cholecalciferol (VITAMIN D) 2000 UNITS CAPS capsule Take 1,000 Units by mouth daily     Historical Provider, MD   Multiple Vitamins-Minerals (MULTIVITAL-M PO) Take  by mouth. Woody smith    Historical Provider, MD       ALLERGIES:  Patient has no known allergies. SOCIAL Hx:  Social History     Socioeconomic History    Marital status:      Spouse name: Not on file    Number of children: Not on file    Years of education: Not on file    Highest education level: Not on file   Occupational History    Occupation: RN   Tobacco Use    Smoking status: Never Smoker    Smokeless tobacco: Never Used   Vaping Use    Vaping Use: Never used   Substance and Sexual Activity    Alcohol use: Yes     Comment: 6-8 beers daily.    1 1/2 daily of cups of coffee    Drug use: No    Sexual activity: Yes     Partners: Female   Other Topics Concern    Not on file   Social History Narrative    ** Merged History Encounter **          Social Determinants of Health     Financial Resource Strain: Low Risk     Difficulty of Paying Living Expenses: Not hard at all   Food Insecurity: No Food Insecurity    Worried About Running Out of Food in the Last Year: Never true    [address] in the Last Year: Never true   Transportation Needs:     Lack of Transportation (Medical): Not on file    Lack of Transportation (Non-Medical): Not on file   Physical Activity:     Days of Exercise per Week: Not on file    Minutes of Exercise per Session: Not on file   Stress:     Feeling of Stress : Not on file   Social Connections:     Frequency of Communication with Friends and Family: Not on file    Frequency of Social Gatherings with Friends and Family: Not on file    Attends Denominational Services: Not on file    Active Member of [address] [place] [address] or Organizations: Not on file    Attends Club or Organization Meetings: Not on file    Marital Status: Not on file   Intimate Partner Violence:     Fear of Current or Ex-Partner: Not on file    Emotionally Abused: Not on file    Physically Abused: Not on file    Sexually Abused: Not on file   Housing Stability:     Unable to Pay for Housing in the Last Year: Not on file    Number of [place] in the Last Year: Not on file    Unstable Housing in the Last Year: Not on file       ROS: Positive in bold  General:   Denies chills, fatigue, fever, malaise, night sweats or weight loss    Psychological:   Denies anxiety, disorientation or hallucinations    ENT:    Denies epistaxis, headaches, vertigo or visual changes    Cardiovascular:   Denies any chest pain, irregular heartbeats, or palpitations. No paroxysmal nocturnal dyspnea. Respiratory:   Denies shortness of breath, coughing, sputum production, hemoptysis, or wheezing.   No 03/21/2022    MCHC 35.5 03/21/2022    RDW 11.7 03/21/2022    SEGSPCT 56 10/24/2013    LYMPHOPCT 21.8 03/21/2022    MONOPCT 10.5 03/21/2022    BASOPCT 0.4 03/21/2022    MONOSABS 0.72 03/21/2022    LYMPHSABS 1.49 03/21/2022    EOSABS 0.04 03/21/2022    BASOSABS 0.03 03/21/2022     CMP:    Lab Results   Component Value Date     03/21/2022    K 4.0 03/21/2022     03/21/2022    CO2 26 03/21/2022    BUN 11 03/21/2022    CREATININE 0.7 03/21/2022    GFRAA >60 03/21/2022    LABGLOM >60 03/21/2022    GLUCOSE 97 03/21/2022    GLUCOSE 82 08/04/2011    PROT 7.5 03/21/2022    LABALBU 5.1 03/21/2022    LABALBU 4.5 08/04/2011    CALCIUM 9.9 03/21/2022    BILITOT 0.6 03/21/2022    ALKPHOS 59 03/21/2022    AST 43 03/21/2022    ALT 66 03/21/2022       ASSESSMENT/PLAN:  1. Near syncope   2. Coronary artery disease status post stent placement  3. BPH with grade 1 prostate cancer  4. GERD  5. Hyperlipidemia  6. hypertension  7. Hypothyroidism  8. Alcohol abuse/dependence    Patient presents with near syncopal episodes. Patient had recent exercise stress test earlier this month which was within normal limits. May be related to change in his medication regimens with regards to Crestor and thyroxine. TSH and free T4 will be checked. Additionally evaluation for underlying endocrine disorder will be done with assessing for urinary metanephrine. Continue to monitor heart rate and blood pressure. Consult cardiology.     Jody Edwards [facility]  9:46 PM  3/21/2022    Electronically signed by Sue Metcalf DO on 3/21/22 at 9:46 PM EDT

## 2022-03-23 ENCOUNTER — CARE COORDINATION (OUTPATIENT)
Dept: OTHER | Facility: CLINIC | Age: 62
End: 2022-03-23

## 2022-03-23 ENCOUNTER — TELEPHONE (OUTPATIENT)
Dept: CARDIOLOGY CLINIC | Age: 62
End: 2022-03-23

## 2022-03-23 DIAGNOSIS — R55 SYNCOPE, UNSPECIFIED SYNCOPE TYPE: Primary | ICD-10-CM

## 2022-03-23 NOTE — CARE COORDINATION
Care Transitions Outreach Attempt    Call within 2 business days of discharge: Yes   Attempted to reach patient for transitions of care follow up. Unable to reach patient. Patient: Brice Manner Patient : 1960 MRN: [id number]    Last Discharge [facility]       Complaint Diagnosis Description Type Department Provider    3/21/22 Loss of Consciousness Near syncope ED to Hosp-Admission (Discharged) (ADMITTED) DO Chi; Sammie Jaquez. .. Was this an external facility discharge? No Discharge Facility: [facility]    Noted following upcoming appointments from discharge chart review:   [facility] follow up appointment(s):   Future Appointments   Date Time Provider Suresh Mejía   3/24/2022  1:30 PM Ericka Valero [facility] [address] [facility]   2022  3:00 PM MD Oralia Morel [facility]   5/10/2022 10:30 AM MD Oralia Morel [facility]     Non-[facility] follow up appointment(s): none noted    ACM attempted to reach patient for introduction to Associate Care Management related to admission for near syncope. HIPAA compliant message left requesting a return phone call. Will attempt to outreach patient again.

## 2022-03-23 NOTE — TELEPHONE ENCOUNTER
----- Message from Abhijeet Page PA-C sent at 3/22/2022  3:40 PM EDT -----  Dion Perez    Can we put a 14 Day event monitor on this patient (Hay Ann is fine) for evaluation of near syncope. He should be getting discharged soon    Thanks    [facility] patient re: Kevin Gastelum for 14 days.     bryon

## 2022-03-24 ENCOUNTER — CARE COORDINATION (OUTPATIENT)
Dept: OTHER | Facility: CLINIC | Age: 62
End: 2022-03-24

## 2022-03-24 ENCOUNTER — NURSE ONLY (OUTPATIENT)
Dept: CARDIOLOGY CLINIC | Age: 62
End: 2022-03-24

## 2022-03-25 ENCOUNTER — TELEPHONE (OUTPATIENT)
Dept: CARDIOLOGY CLINIC | Age: 62
End: 2022-03-25

## 2022-03-25 ENCOUNTER — CARE COORDINATION (OUTPATIENT)
Dept: OTHER | Facility: CLINIC | Age: 62
End: 2022-03-25

## 2022-03-25 LAB
CREATININE 24 HOUR URINE: NORMAL MG/D (ref 800–2100)
CREATININE URINE: 87 MG/DL
EKG ATRIAL RATE: 52 BPM
EKG P AXIS: 64 DEGREES
EKG P-R INTERVAL: 158 MS
EKG Q-T INTERVAL: 456 MS
EKG QRS DURATION: 98 MS
EKG QTC CALCULATION (BAZETT): 424 MS
EKG R AXIS: 30 DEGREES
EKG T AXIS: 43 DEGREES
EKG VENTRICULAR RATE: 52 BPM
HOURS COLLECTED: 2330
METANEPHRINE INTREP URINE: NORMAL
METANEPHRINE UG/G CRE: 85 UG/G CRT (ref 0–300)
METANEPHRINE, UR-PER VOL: 74 UG/L
METANEPHRINES URINE: NORMAL UG/D (ref 55–320)
NORMETANEPHRINE 24 HOUR URINE: NORMAL UG/D (ref 114–865)
NORMETANEPHRINE, (G/CRT): 187 UG/G CRT (ref 0–400)
NORMETANEPHRINES, NMOL/L: 163 UG/L
URINE TOTAL VOLUME: NORMAL

## 2022-03-25 NOTE — CARE COORDINATION
[place] [address] Transitions Follow Up Call    3/25/2022    Patient: Addie Espinal  Patient : 1960   MRN: [id number]  Reason for Admission: Accelerated hypertension  Discharge Date: 3/22/22 RARS: No data recorded       Spoke with: Addie Espinal, patient    Transitions of Care Initial Call    Was this an external facility discharge? No Discharge Facility: [address] [place] [address] to be reviewed by the provider   Additional needs identified to be addressed with provider: No  none             Method of communication with provider : none    Was this a readmission? No  Patient stated reason for admission: dizzy, hand tingling, fullness to head  Patients top risk factors for readmission: medical condition-cad, htn    Care Transition Nurse (CTN) contacted the patient by telephone to perform post hospital discharge assessment. Verified name and  with patient as identifiers. Provided introduction to self, and explanation of the CTN role. CTN reviewed discharge instructions, medical action plan and red flags with patient who verbalized understanding. Patient given an opportunity to ask questions and does not have any further questions or concerns at this time. Were discharge instructions available to patient? Yes. Reviewed appropriate site of care based on symptoms and resources available to patient including: PCP  Specialist  When to call 911. The patient agrees to contact the PCP office for questions related to their healthcare. Medication reconciliation was performed with patient, who verbalizes understanding of administration of home medications. Prior to Visit Medications    Medication Sig Taking?  Authorizing Provider   Zinc 15 MG CAPS Take 30 mg by mouth daily Yes Historical Provider, MD   hydrALAZINE (APRESOLINE) 25 MG tablet Take 1 tablet by mouth every 12 hours Yes TALI Porter - CNP   levothyroxine (SYNTHROID) 50 MCG tablet Take 1 tablet by mouth daily Yes Prasanth Montgomery MD amLODIPine (NORVASC) 5 MG tablet Take 1 tablet by mouth nightly Yes Myra Cordoba MD   amLODIPine (NORVASC) 2.5 MG tablet Take 1 tablet by mouth daily  Patient taking differently: Take 2.5 mg by mouth every morning  Yes Mary Wolf MD   rosuvastatin (CRESTOR) 20 MG tablet Take 1 tablet by mouth every other day  Patient taking differently: Take 20 mg by mouth at bedtime  Yes Mary Wolf MD   atenolol (TENORMIN) 25 MG tablet Take 1 tablet by mouth daily  Patient taking differently: Take 25 mg by mouth every morning  Yes Mary Wolf MD   olmesartan-hydroCHLOROthiazide (BENICAR HCT) 40-12.5 MG per tablet Take 1 tablet by mouth daily  Patient taking differently: Take 1 tablet by mouth every morning  Yes Mary Wolf MD   Coenzyme Q10 (CO Q 10) 100 MG CAPS Take by mouth daily  Yes Historical Provider, MD   aspirin 81 MG tablet Take 81 mg by mouth daily  Yes Historical Provider, MD   Cholecalciferol (VITAMIN D) 2000 UNITS CAPS capsule Take 1,000 Units by mouth daily  Yes Historical Provider, MD   Multiple Vitamins-Minerals (MULTIVITAL-M PO) Take  by mouth. Shaklee vits Yes Historical Provider, MD GALLO provided contact information. Plan for follow-up call in 5-7 days based on severity of symptoms and risk factors.   Plan for next call: symptom management-dizziness, palpitations  follow up appointment-any appts schedued  medication management-Med changes      Care Transitions Subsequent and Final Call    Subsequent and Final Calls  Do you have any ongoing symptoms?: Yes  Onset of Patient-reported symptoms: Other  Patient-reported symptoms: Other  Do you have any questions related to your medications?: No  Do you currently have any active services?: No  Do you have any needs or concerns that I can assist you with?: Yes  Patient-reported Needs or Concerns: possible appeal for cardiac monitor  Identified Barriers: None  Care Transitions Interventions  No Identified Needs  Other Interventions:         Spoke with patient for initial care transition call post hospital discharge. Med review completed. Patient reports held Synthroid x 3 days due to thyrotoxicosis. Patient to continue taking Synthroid 50 mcg with his morning meal as he had been doing for years to avoid issues. Patient reports monitoring blood pressure four times daily. Patient reports bp 131/75 3/24/22 am and at 1100 140/70. Patient did take Hydralazine. Patient reprots bp at 1430 123/68, asymptomatic. Patient presented to the emergency department on 3/21/22 for feeling of doom, throbbing in ears, feeling like going to black out, tachycardia, palpitations. Patient reports minor dizziness that is brief at this time and a little fatigue. Patient denies any other symptoms. Patient denies stimulant use. Patient reports weight loss 12 lbs last 2 weeks. Patient reports told 14 d heart monitor is covered under insurance; however, company [employer] is out of network. Patient removed monitor and returned to company to avoid costs. Patient denies any further needs, questions, or concerns at this time. Patient is agreeable to future follow up calls.     Follow Up  Future Appointments   Date Time Provider Suresh Mejía   4/4/2022  3:00 PM Christopher Little MD [facility]   5/10/2022 10:30 AM Christopher Little MD [facility]       Traci Yousif RN

## 2022-03-31 ENCOUNTER — CARE COORDINATION (OUTPATIENT)
Dept: OTHER | Facility: CLINIC | Age: 62
End: 2022-03-31

## 2022-03-31 DIAGNOSIS — E78.5 HYPERLIPIDEMIA, UNSPECIFIED HYPERLIPIDEMIA TYPE: ICD-10-CM

## 2022-03-31 DIAGNOSIS — E03.9 HYPOTHYROIDISM, UNSPECIFIED TYPE: ICD-10-CM

## 2022-03-31 DIAGNOSIS — I10 ESSENTIAL HYPERTENSION: ICD-10-CM

## 2022-03-31 LAB
ALBUMIN SERPL-MCNC: 4.8 G/DL (ref 3.5–5.2)
ALP BLD-CCNC: 54 U/L (ref 40–129)
ALT SERPL-CCNC: 38 U/L (ref 0–40)
ANION GAP SERPL CALCULATED.3IONS-SCNC: 15 MMOL/L (ref 7–16)
AST SERPL-CCNC: 29 U/L (ref 0–39)
BASOPHILS ABSOLUTE: 0.04 E9/L (ref 0–0.2)
BASOPHILS RELATIVE PERCENT: 0.7 % (ref 0–2)
BILIRUB SERPL-MCNC: 0.7 MG/DL (ref 0–1.2)
BUN BLDV-MCNC: 10 MG/DL (ref 6–23)
CALCIUM SERPL-MCNC: 9.9 MG/DL (ref 8.6–10.2)
CHLORIDE BLD-SCNC: 103 MMOL/L (ref 98–107)
CHOLESTEROL, FASTING: 161 MG/DL (ref 0–199)
CO2: 25 MMOL/L (ref 22–29)
CREAT SERPL-MCNC: 0.9 MG/DL (ref 0.7–1.2)
EOSINOPHILS ABSOLUTE: 0.08 E9/L (ref 0.05–0.5)
EOSINOPHILS RELATIVE PERCENT: 1.3 % (ref 0–6)
GFR AFRICAN AMERICAN: >60
GFR NON-AFRICAN AMERICAN: >60 ML/MIN/1.73
GLUCOSE FASTING: 88 MG/DL (ref 74–99)
HCT VFR BLD CALC: 47.3 % (ref 37–54)
HDLC SERPL-MCNC: 52 MG/DL
HEMOGLOBIN: 16.2 G/DL (ref 12.5–16.5)
IMMATURE GRANULOCYTES #: 0.02 E9/L
IMMATURE GRANULOCYTES %: 0.3 % (ref 0–5)
LDL CHOLESTEROL CALCULATED: 87 MG/DL (ref 0–99)
LYMPHOCYTES ABSOLUTE: 1.82 E9/L (ref 1.5–4)
LYMPHOCYTES RELATIVE PERCENT: 30.1 % (ref 20–42)
MCH RBC QN AUTO: 32.6 PG (ref 26–35)
MCHC RBC AUTO-ENTMCNC: 34.2 % (ref 32–34.5)
MCV RBC AUTO: 95.2 FL (ref 80–99.9)
MONOCYTES ABSOLUTE: 0.77 E9/L (ref 0.1–0.95)
MONOCYTES RELATIVE PERCENT: 12.7 % (ref 2–12)
NEUTROPHILS ABSOLUTE: 3.32 E9/L (ref 1.8–7.3)
NEUTROPHILS RELATIVE PERCENT: 54.9 % (ref 43–80)
PDW BLD-RTO: 12.3 FL (ref 11.5–15)
PLATELET # BLD: 147 E9/L (ref 130–450)
PMV BLD AUTO: 10.6 FL (ref 7–12)
POTASSIUM SERPL-SCNC: 4.1 MMOL/L (ref 3.5–5)
RBC # BLD: 4.97 E12/L (ref 3.8–5.8)
SODIUM BLD-SCNC: 143 MMOL/L (ref 132–146)
TOTAL PROTEIN: 7.2 G/DL (ref 6.4–8.3)
TRIGLYCERIDE, FASTING: 112 MG/DL (ref 0–149)
TSH SERPL DL<=0.05 MIU/L-ACNC: 4.33 UIU/ML (ref 0.27–4.2)
VLDLC SERPL CALC-MCNC: 22 MG/DL
WBC # BLD: 6.1 E9/L (ref 4.5–11.5)

## 2022-03-31 NOTE — CARE COORDINATION
[facility] [address] Transitions Follow Up Call    3/31/2022    Patient: Yonas Conde  Patient : 1960   MRN: [id number]  Reason for Admission: Accelerated hypertension  Discharge Date: 3/22/22 RARS: No data recorded       Spoke with: No One    ACM attempted to reach patient for follow up call regarding admission for accelerated htn. HIPAA compliant message left requesting a return phone call at patients convenience. Will continue to follow.        Follow Up  Future Appointments   Date Time Provider Suresh Mejía   2022  3:00 PM Gonzalez Green MD [facility]   5/10/2022 10:30 AM Gonzalez Green MD [facility]       Pedro Gomez RN

## 2022-04-04 ENCOUNTER — OFFICE VISIT (OUTPATIENT)
Dept: FAMILY MEDICINE CLINIC | Age: 62
End: 2022-04-04
Payer: COMMERCIAL

## 2022-04-04 VITALS
BODY MASS INDEX: 27.8 KG/M2 | HEIGHT: 69 IN | TEMPERATURE: 97.9 F | OXYGEN SATURATION: 98 % | SYSTOLIC BLOOD PRESSURE: 134 MMHG | WEIGHT: 187.7 LBS | HEART RATE: 71 BPM | DIASTOLIC BLOOD PRESSURE: 80 MMHG

## 2022-04-04 DIAGNOSIS — I10 ESSENTIAL HYPERTENSION: ICD-10-CM

## 2022-04-04 DIAGNOSIS — E03.9 HYPOTHYROIDISM, UNSPECIFIED TYPE: Primary | ICD-10-CM

## 2022-04-04 DIAGNOSIS — I70.1 RENAL ARTERY ATHEROSCLEROSIS (HCC): ICD-10-CM

## 2022-04-04 PROCEDURE — 1111F DSCHRG MED/CURRENT MED MERGE: CPT | Performed by: INTERNAL MEDICINE

## 2022-04-04 PROCEDURE — 99495 TRANSJ CARE MGMT MOD F2F 14D: CPT | Performed by: INTERNAL MEDICINE

## 2022-04-04 ASSESSMENT — ENCOUNTER SYMPTOMS
NAUSEA: 0
SHORTNESS OF BREATH: 0
EYE DISCHARGE: 0
ABDOMINAL PAIN: 0
BLOOD IN STOOL: 0

## 2022-04-04 ASSESSMENT — PATIENT HEALTH QUESTIONNAIRE - PHQ9
SUM OF ALL RESPONSES TO PHQ QUESTIONS 1-9: 2
2. FEELING DOWN, DEPRESSED OR HOPELESS: 1
SUM OF ALL RESPONSES TO PHQ QUESTIONS 1-9: 2
SUM OF ALL RESPONSES TO PHQ9 QUESTIONS 1 & 2: 2
1. LITTLE INTEREST OR PLEASURE IN DOING THINGS: 1

## 2022-04-04 NOTE — PROGRESS NOTES
Post-Discharge Transitional Care Follow Up      Sherlynn Klinefelter   YOB: 1960    Date of Office Visit:  4/4/2022  Date of Hospital Admission: 3/21/22  Date of Hospital Discharge: 3/22/22  Readmission Risk Score (high >=14%. Medium >=10%):No data recorded    Care management risk score Rising risk (score 2-5) and Complex Care (Scores >=6): 4     Non face to face  following discharge, date last encounter closed (first attempt may have been earlier): 3/24/2022  5:38 PM     Call initiated 2 business days of discharge: Yes     Hypothyroidism, unspecified type  -     TSH; Future  -     T4, Free; Future  -     [place] DISCHARGE MEDS RECONCILED W/ CURRENT OUTPATIENT MED LIST  Renal artery atherosclerosis (HCC)  -     [place] DISCHARGE MEDS RECONCILED W/ CURRENT OUTPATIENT MED LIST  Essential hypertension  -     [place] DISCHARGE MEDS RECONCILED W/ CURRENT OUTPATIENT MED LIST      Medical Decision Making: high complexity  Return in 2 months (on 6/4/2022). Subjective:   HPI    Inpatient course: Discharge summary reviewed- see chart.   Pt was in hospital with Accelerated HTN, Feeling foggy, HA, Near Syncope    Initial work up for Metanephrine urine, CT abd for adrenal adenoma- Neg  Echo -ok    Pt has started taking his thyroid medicine- in AM- and symptoms started about 2 weeks after    His FT4 - was high  Pt has decrease his Levothyroxine to 25 mcg daily    Symptoms has decrease - but plateau per pt  Had stress test in 3/22- Neg             Interval history/Current status:    Pt was in hospital     Patient Active Problem List   Diagnosis    Glenoid labral tear    Shoulder impingement    Rotator cuff tear    Blunt eye injury, bilateral    Facial injury    Hematoma of thigh    CAD (coronary artery disease)    HTN (hypertension)    History of PTCA    Hyperlipidemia    Hypothyroidism    DJD (degenerative joint disease)    ETOH abuse    Tubular adenoma of colon    H/O gastritis    Deviated septum    Nasal turbinate hypertrophy    Nasal congestion    Chronic maxillary sinusitis    Trochanteric bursitis, right hip    Right hip tendonitis    Primary osteoarthritis of right knee    ALT (SGPT) level raised    Complex tear of medial meniscus of right knee as current injury    Prostate cancer ([facility] [address]    Dizziness    Elevated liver enzymes    Essential hypertension    Hepatic steatosis    Near syncope    Pre-syncope    Accelerated hypertension       Medications listed as ordered at the time of discharge from hospital     Medication List          Accurate as of April 4, 2022  4:03 PM. If you have any questions, ask your nurse or doctor. CHANGE how you take these medications    * amLODIPine 2.5 MG tablet  Commonly known as: NORVASC  Take 1 tablet by mouth daily  What changed: when to take this     * amLODIPine 5 MG tablet  Commonly known as: Norvasc  Take 1 tablet by mouth nightly  What changed: Another medication with the same name was changed. Make sure you understand how and when to take each. atenolol 25 MG tablet  Commonly known as: TENORMIN  Take 1 tablet by mouth daily  What changed: when to take this     levothyroxine 50 MCG tablet  Commonly known as: Synthroid  Take 1 tablet by mouth daily  What changed: additional instructions     olmesartan-hydroCHLOROthiazide 40-12.5 MG per tablet  Commonly known as: Benicar HCT  Take 1 tablet by mouth daily  What changed:   · when to take this  · additional instructions     rosuvastatin 20 MG tablet  Commonly known as: Crestor  Take 1 tablet by mouth every other day  What changed: when to take this         * This list has 2 medication(s) that are the same as other medications prescribed for you. Read the directions carefully, and ask your doctor or other care provider to review them with you.             CONTINUE taking these medications    aspirin 81 MG tablet     Co Q 10 100 MG Caps     MULTIVITAL-M PO     vitamin D 50 MCG (2000 UT) Caps capsule Zinc 15 MG Caps        STOP taking these medications    hydrALAZINE 25 MG tablet  Commonly known as: APRESOLINE  Stopped by: Harry Arceo MD             Medications marked \"taking\" at this time  Outpatient Medications Marked as Taking for the 4/4/22 encounter (Office Visit) with Harry Arceo MD   Medication Sig Dispense Refill    Zinc 15 MG CAPS Take 30 mg by mouth daily      levothyroxine (SYNTHROID) 50 MCG tablet Take 1 tablet by mouth daily (Patient taking differently: Take 50 mcg by mouth daily Pt taking 1/2 tablet (25mcg) daily) 90 tablet 1    amLODIPine (NORVASC) 5 MG tablet Take 1 tablet by mouth nightly 90 tablet 1    amLODIPine (NORVASC) 2.5 MG tablet Take 1 tablet by mouth daily (Patient taking differently: Take 2.5 mg by mouth every morning ) 90 tablet 1    rosuvastatin (CRESTOR) 20 MG tablet Take 1 tablet by mouth every other day (Patient taking differently: Take 20 mg by mouth at bedtime ) 90 tablet 1    atenolol (TENORMIN) 25 MG tablet Take 1 tablet by mouth daily (Patient taking differently: Take 25 mg by mouth every morning ) 90 tablet 1    olmesartan-hydroCHLOROthiazide (BENICAR HCT) 40-12.5 MG per tablet Take 1 tablet by mouth daily (Patient taking differently: Take 1 tablet by mouth every morning Taking around noon) 90 tablet 1    Coenzyme Q10 (CO Q 10) 100 MG CAPS Take by mouth daily       aspirin 81 MG tablet Take 81 mg by mouth daily       Cholecalciferol (VITAMIN D) 2000 UNITS CAPS capsule Take 1,000 Units by mouth daily           Medications patient taking as of now reconciled against medications ordered at time of hospital discharge: Yes    Review of Systems   Constitutional: Negative for chills and fever. HENT: Negative for congestion and ear pain. Eyes: Negative for discharge. Respiratory: Negative for shortness of breath (No new SOB). Cardiovascular: Negative for chest pain and leg swelling.    Gastrointestinal: Negative for abdominal pain, blood in stool and nausea. Endocrine: Negative for polydipsia. Genitourinary: Negative for flank pain and hematuria. Musculoskeletal: Negative for myalgias and neck pain. Skin: Negative for rash. Neurological: Negative for dizziness and seizures. Hematological: Does not bruise/bleed easily. Psychiatric/Behavioral: Negative for hallucinations and suicidal ideas. Objective:    /80 (Position: Sitting)   Pulse 71   Temp 97.9 °F (36.6 °C) (Temporal)   Ht 5' 9\" (1.753 m)   Wt 187 lb 11.2 oz (85.1 kg)   SpO2 98%   BMI 27.72 kg/m²   Physical Exam  Vitals reviewed. Constitutional:       Appearance: He is well-developed. HENT:      Head: Normocephalic and atraumatic. Eyes:      Conjunctiva/sclera: Conjunctivae normal.      Pupils: Pupils are equal, round, and reactive to light. Neck:      Vascular: No JVD. Cardiovascular:      Rate and Rhythm: Normal rate and regular rhythm. Pulmonary:      Effort: Pulmonary effort is normal.      Breath sounds: Normal breath sounds. No rales. Abdominal:      General: Bowel sounds are normal.      Palpations: Abdomen is soft. Musculoskeletal:         General: Normal range of motion. Lymphadenopathy:      Cervical: No cervical adenopathy. Skin:     General: Skin is warm and dry. Neurological:      Mental Status: He is alert and oriented to person, place, and time. Psychiatric:         Behavior: Behavior normal.         An electronic signature was used to authenticate this note.   --Chriss Suggs MD    Add    4/5/22    Discussed with Dr Sara Perez( Not on patient case) - about his CT findings on renal atherosclerosis of his bilateral renal artery    I think best option is to have renal angiogram by IR at [facility]     Discussed with patient and he is updated    He will look in to this

## 2022-04-06 DIAGNOSIS — I70.1 RENAL ARTERY ATHEROSCLEROSIS (HCC): Primary | ICD-10-CM

## 2022-04-07 ENCOUNTER — CARE COORDINATION (OUTPATIENT)
Dept: OTHER | Facility: CLINIC | Age: 62
End: 2022-04-07

## 2022-04-07 ENCOUNTER — TELEPHONE (OUTPATIENT)
Dept: FAMILY MEDICINE CLINIC | Age: 62
End: 2022-04-07

## 2022-04-07 NOTE — TELEPHONE ENCOUNTER
----- Message from Nena Camp sent at 4/7/2022  8:52 AM EDT -----  Subject: Message to Provider    QUESTIONS  Information for Provider? [place] with [facility] radiology was calling in   regard to prior authorization for radiology orders. MRI renal with   contrast. She would like to know if the prior Shauna Dunn has been started yet? Call back # [phone]  ---------------------------------------------------------------------------  --------------  Lokesh BRANTLEY  What is the best way for the office to contact you? OK to leave message on   voicemail  Preferred Call Back Phone Number? [phone]  ---------------------------------------------------------------------------  --------------  SCRIPT ANSWERS  Relationship to Patient? Third Party  Third Party Type? Other  Other Third Party Type? Radiology  Representative Name?  [facility] AND [facility] radiology

## 2022-04-07 NOTE — CARE COORDINATION
[facility] Transitions Follow Up Call    2022    Patient: Justin Gonzaels  Patient : 1960   MRN: [id number]  Reason for Admission: Accelerated hypetension  Discharge Date: 3/22/22 RARS: No data recorded       Spoke with: No one    ACM attempted to reach patient for follow up call regarding admission. HIPAA compliant message left requesting a return phone call at patients convenience. No further outreach scheduled with this ACM, ACM will sign off care team at this time. Patient has been provided with this ACM's contact information.          Follow Up  Future Appointments   Date Time Provider Suresh Mejía   2022 11:45 AM Saroj Fuentes MD [facility]       Kristina Brra RN

## 2022-04-12 ENCOUNTER — TELEPHONE (OUTPATIENT)
Dept: CARDIOLOGY CLINIC | Age: 62
End: 2022-04-12

## 2022-04-12 ENCOUNTER — HOSPITAL ENCOUNTER (OUTPATIENT)
Dept: INTERVENTIONAL RADIOLOGY/VASCULAR | Age: 62
Discharge: HOME | End: 2022-04-14
Payer: COMMERCIAL

## 2022-04-12 ENCOUNTER — HOSPITAL ENCOUNTER (OUTPATIENT)
Dept: MRI IMAGING | Age: 62
Discharge: HOME | End: 2022-04-14
Payer: COMMERCIAL

## 2022-04-12 DIAGNOSIS — I70.1 RENAL ARTERY ATHEROSCLEROSIS (HCC): ICD-10-CM

## 2022-04-12 DIAGNOSIS — I70.1 ATHEROSCLEROTIC RAS (RENAL ARTERY STENOSIS), BILATERAL (HCC): ICD-10-CM

## 2022-04-12 DIAGNOSIS — R55 SYNCOPE, UNSPECIFIED SYNCOPE TYPE: ICD-10-CM

## 2022-04-12 PROCEDURE — 6360000004 HC RX CONTRAST MEDICATION: Performed by: RADIOLOGY

## 2022-04-12 PROCEDURE — A9579 GAD-BASE MR CONTRAST NOS,1ML: HCPCS | Performed by: RADIOLOGY

## 2022-04-12 PROCEDURE — 74185 MRA ABD W OR W/O CNTRST: CPT | Performed by: RADIOLOGY

## 2022-04-12 PROCEDURE — 99213 OFFICE O/P EST LOW 20 MIN: CPT | Performed by: RADIOLOGY

## 2022-04-12 PROCEDURE — C8900 MRA W/CONT, ABD: HCPCS

## 2022-04-12 RX ADMIN — GADOTERIDOL 18 ML: 279.3 INJECTION, SOLUTION INTRAVENOUS at 10:05

## 2022-04-12 NOTE — TELEPHONE ENCOUNTER
Per Dr Zeus Gonzales,    Event monitor is good. No problems. Called patient re:  Above.   Vanessa Meyer

## 2022-05-03 ENCOUNTER — PATIENT MESSAGE (OUTPATIENT)
Dept: FAMILY MEDICINE CLINIC | Age: 62
End: 2022-05-03

## 2022-05-03 NOTE — TELEPHONE ENCOUNTER
From: Tish Soriano  To: Dr. Elina Licona: 5/3/2022 1:07 PM EDT  Subject: Olmesartan/HCTZ refill    Request refill by 5/25/22. Thank you.

## 2022-05-04 RX ORDER — OLMESARTAN MEDOXOMIL AND HYDROCHLOROTHIAZIDE 40/12.5 40; 12.5 MG/1; MG/1
1 TABLET ORAL DAILY
Qty: 90 TABLET | Refills: 0 | Status: SHIPPED
Start: 2022-05-04 | End: 2022-07-28 | Stop reason: SDUPTHER

## 2022-06-15 ENCOUNTER — PATIENT MESSAGE (OUTPATIENT)
Dept: FAMILY MEDICINE CLINIC | Age: 62
End: 2022-06-15

## 2022-06-15 RX ORDER — ATENOLOL 25 MG/1
25 TABLET ORAL DAILY
Qty: 90 TABLET | Refills: 0 | Status: SHIPPED
Start: 2022-06-15 | End: 2022-07-28 | Stop reason: SDUPTHER

## 2022-06-15 NOTE — TELEPHONE ENCOUNTER
From: Sandrita Eric  To: Dr. Montalvo Pont: 6/15/2022 8:17 AM EDT  Subject: Atenolol 25    Request refill please. I have one week remaining.  Thank you, [facility]

## 2022-07-25 DIAGNOSIS — E03.9 HYPOTHYROIDISM, UNSPECIFIED TYPE: ICD-10-CM

## 2022-07-25 LAB
T4 FREE: 1.25 NG/DL (ref 0.93–1.7)
TSH SERPL DL<=0.05 MIU/L-ACNC: 3.59 UIU/ML (ref 0.27–4.2)

## 2022-07-28 ENCOUNTER — OFFICE VISIT (OUTPATIENT)
Dept: FAMILY MEDICINE CLINIC | Age: 62
End: 2022-07-28
Payer: COMMERCIAL

## 2022-07-28 VITALS
TEMPERATURE: 97.8 F | HEIGHT: 69 IN | BODY MASS INDEX: 28.5 KG/M2 | OXYGEN SATURATION: 97 % | DIASTOLIC BLOOD PRESSURE: 80 MMHG | SYSTOLIC BLOOD PRESSURE: 142 MMHG | WEIGHT: 192.4 LBS | HEART RATE: 66 BPM

## 2022-07-28 DIAGNOSIS — E03.9 HYPOTHYROIDISM, UNSPECIFIED TYPE: ICD-10-CM

## 2022-07-28 DIAGNOSIS — R51.9 NONINTRACTABLE EPISODIC HEADACHE, UNSPECIFIED HEADACHE TYPE: ICD-10-CM

## 2022-07-28 DIAGNOSIS — H93.13 TINNITUS OF BOTH EARS: ICD-10-CM

## 2022-07-28 DIAGNOSIS — I10 ESSENTIAL HYPERTENSION: Primary | ICD-10-CM

## 2022-07-28 DIAGNOSIS — E78.5 HYPERLIPIDEMIA, UNSPECIFIED HYPERLIPIDEMIA TYPE: ICD-10-CM

## 2022-07-28 PROCEDURE — 99214 OFFICE O/P EST MOD 30 MIN: CPT | Performed by: INTERNAL MEDICINE

## 2022-07-28 RX ORDER — ATENOLOL 25 MG/1
25 TABLET ORAL EVERY MORNING
Qty: 90 TABLET | Refills: 0 | Status: SHIPPED
Start: 2022-07-28 | End: 2022-10-24 | Stop reason: SDUPTHER

## 2022-07-28 RX ORDER — OLMESARTAN MEDOXOMIL AND HYDROCHLOROTHIAZIDE 40/12.5 40; 12.5 MG/1; MG/1
1 TABLET ORAL EVERY EVENING
Qty: 90 TABLET | Refills: 0 | Status: SHIPPED
Start: 2022-07-28 | End: 2022-10-24 | Stop reason: SDUPTHER

## 2022-07-28 RX ORDER — UBIDECARENONE/VIT E ACET 100MG-5
CAPSULE ORAL
COMMUNITY
Start: 2015-01-01

## 2022-07-28 RX ORDER — AMLODIPINE BESYLATE 2.5 MG/1
2.5 TABLET ORAL EVERY MORNING
Qty: 90 TABLET | Refills: 0 | Status: SHIPPED
Start: 2022-07-28 | End: 2022-10-24 | Stop reason: SDUPTHER

## 2022-07-28 RX ORDER — SILDENAFIL CITRATE 20 MG/1
TABLET ORAL
COMMUNITY
Start: 2022-05-02

## 2022-07-28 ASSESSMENT — ENCOUNTER SYMPTOMS
NAUSEA: 0
EYE DISCHARGE: 0
SHORTNESS OF BREATH: 0
ABDOMINAL PAIN: 0
BLOOD IN STOOL: 0

## 2022-07-28 NOTE — PROGRESS NOTES
Chief Complaint   Patient presents with    Hypertension     Here for follow up on BP - BP log brought today      Discuss Labs     Review labs from 07/25/2022. Pt stopped his Levothyroxine     Headache     Having headaches and ringing in the ears        HPI:  Patient is here for follow-up  Above noted    Has HA and tinnitus bilateral since march this year    Also describe as fullness in throat - while talking, get better if rest for some time    No other associate symptoms  No Swallowing difficulty etc    Very active physically - no limitation  Allergy and Medications are reviewed and updated. Past Medical History, Surgical History, and Family History has been reviewed and updated. Review of Systems:  Review of Systems   Constitutional:  Negative for chills and fever. HENT:  Negative for congestion and ear pain. Eyes:  Negative for discharge. Respiratory:  Negative for shortness of breath (No new SOB). Cardiovascular:  Negative for chest pain and leg swelling. Gastrointestinal:  Negative for abdominal pain, blood in stool and nausea. Endocrine: Negative for polydipsia. Genitourinary:  Negative for flank pain and hematuria. Musculoskeletal:  Negative for myalgias and neck pain. Skin:  Negative for rash. Neurological:  Negative for dizziness and seizures. Hematological:  Does not bruise/bleed easily. Psychiatric/Behavioral:  Negative for hallucinations and suicidal ideas. Vitals:    07/28/22 1222 07/28/22 1232   BP: (!) 142/80 (!) 142/80   Pulse: 66    Temp: 97.8 °F (36.6 °C)    TempSrc: Temporal    SpO2: 97%    Weight: 192 lb 6.4 oz (87.3 kg)    Height: 5' 9\" (1.753 m)        Physical Exam  Vitals reviewed. Constitutional:       Appearance: He is well-developed. HENT:      Head: Normocephalic and atraumatic. Eyes:      Conjunctiva/sclera: Conjunctivae normal.      Pupils: Pupils are equal, round, and reactive to light. Neck:      Vascular: No JVD.    Cardiovascular: Rate and Rhythm: Normal rate and regular rhythm. Pulmonary:      Effort: Pulmonary effort is normal.      Breath sounds: Normal breath sounds. Abdominal:      General: Bowel sounds are normal.      Palpations: Abdomen is soft. Musculoskeletal:         General: Normal range of motion. Skin:     General: Skin is warm and dry. Neurological:      Mental Status: He is alert and oriented to person, place, and time.    Psychiatric:         Behavior: Behavior normal.        Labs :    Lab Results   Component Value Date    WBC 6.1 03/31/2022    HGB 16.2 03/31/2022    HCT 47.3 03/31/2022     03/31/2022    CHOL 215 (H) 10/19/2021    TRIG 178 (H) 10/19/2021    HDL 52 03/31/2022    ALT 38 03/31/2022    AST 29 03/31/2022     03/31/2022    K 4.1 03/31/2022     03/31/2022    CREATININE 0.9 03/31/2022    BUN 10 03/31/2022    CO2 25 03/31/2022    TSH 3.590 07/25/2022    PSA 6.29 (H) 06/07/2022    INR 0.9 06/17/2012    GLUF 88 03/31/2022    LABA1C 4.9 04/01/2021     Lab Results   Component Value Date/Time    COLORU YELLOW 01/26/2011 09:40 PM    NITRU NEGATIVE 01/26/2011 09:40 PM    GLUCOSEU NEG 10/04/2021 04:17 PM    GLUCOSEU NEGATIVE 01/26/2011 09:40 PM    KETUA NEG 10/04/2021 04:17 PM    KETUA NEGATIVE 01/26/2011 09:40 PM    UROBILINOGEN 0.2 01/26/2011 09:40 PM    BILIRUBINUR NEG 10/04/2021 04:17 PM    BILIRUBINUR NEGATIVE 01/26/2011 09:40 PM     Lab Results   Component Value Date/Time    PSA 6.29 06/07/2022 01:41 PM             ASSESSMENT     Patient Active Problem List    Diagnosis Date Noted    Accelerated hypertension 03/22/2022    Near syncope 03/21/2022    Pre-syncope 03/21/2022    Hepatic steatosis 02/16/2022    Prostate cancer ([facility] [address] 10/04/2021    Dizziness 10/04/2021    Elevated liver enzymes 10/04/2021    Essential hypertension 10/04/2021     Assessment & Plan Note:      Well-controlled, continue current medications   On Benicar , Norvasc and Atenolol      Complex tear of medial meniscus of right knee as current injury 11/14/2019    ALT (SGPT) level raised 11/15/2018    Primary osteoarthritis of right knee 08/29/2017    Right hip tendonitis 06/27/2016    Trochanteric bursitis, right hip 02/15/2016    Deviated septum 12/16/2015    Nasal turbinate hypertrophy 12/16/2015    Nasal congestion 12/16/2015    Chronic maxillary sinusitis 12/16/2015    History of PTCA 10/01/2013    Hyperlipidemia 10/01/2013     Assessment & Plan Note:      Well-controlled, continue current medications     On Crestor 20 mg daily          Hypothyroidism 10/01/2013     Assessment & Plan Note:      Off medication  Labs - Normal       DJD (degenerative joint disease) 10/01/2013    ETOH abuse 10/01/2013    Tubular adenoma of colon 10/01/2013     Overview Note:     h/o      H/O gastritis 10/01/2013    Blunt eye injury, bilateral 06/17/2012    Facial injury 06/17/2012    Hematoma of thigh 06/17/2012    CAD (coronary artery disease) 06/17/2012    HTN (hypertension) 06/17/2012    Glenoid labral tear 01/13/2011    Shoulder impingement 01/13/2011    Rotator cuff tear 01/13/2011        Diagnosis:   1. Essential hypertension  Assessment & Plan:   Well-controlled, continue current medications   On Benicar , Norvasc and Atenolol  Orders:  -     atenolol (TENORMIN) 25 MG tablet; Take 1 tablet by mouth every morning, Disp-90 tablet, R-0Normal  -     olmesartan-hydroCHLOROthiazide (BENICAR HCT) 40-12.5 MG per tablet; Take 1 tablet by mouth every evening, Disp-90 tablet, R-0Normal  -     amLODIPine (NORVASC) 2.5 MG tablet; Take 1 tablet by mouth every morning, Disp-90 tablet, R-0Normal  2. Hypothyroidism, unspecified type  Assessment & Plan:   Off medication  Labs - Normal   3. Hyperlipidemia, unspecified hyperlipidemia type  Assessment & Plan:   Well-controlled, continue current medications     On Crestor 20 mg daily      4. Tinnitus of both ears  -     MRA HEAD WO CONTRAST; Future  -     MRI BRAIN WO CONTRAST;  Future  -     [facility]  5. Nonintractable episodic headache, unspecified headache type  -     MRA HEAD WO CONTRAST; Future  -     MRI BRAIN WO CONTRAST; Future  -     One Segundo Emery [facility]         PLAN:     RFs are sent  Off Thyroid med    Ref to ENT    Check MRI and MRA - non contrast    Pt is stable on current medical treatment. Continue current treatment plan    Side effects/Adverse effects/Precautions are reviewed with patient. Low salt, Low Carb diet an low fat diet  Continue medications as advised and take them regularly  Regular exercises as advised    Discussed natural and expected course of this diagnosis and need to alert me if symptoms do not follow expected course, or if any worse. Smoking cessation if applicable, discussed with patient. There are no Patient Instructions on file for this visit. Return in about 11 weeks (around 10/13/2022).

## 2022-08-01 ENCOUNTER — TELEPHONE (OUTPATIENT)
Dept: FAMILY MEDICINE CLINIC | Age: 62
End: 2022-08-01

## 2022-08-01 NOTE — TELEPHONE ENCOUNTER
Good Morning Doctor: We have scheduled Gelacio Leann for his MRI and MRA. As I was looking at the dx. Should the MRI be with IAC? If so, please place another order. Thank you.

## 2022-08-02 DIAGNOSIS — H93.13 TINNITUS OF BOTH EARS: Primary | ICD-10-CM

## 2022-08-02 DIAGNOSIS — Z01.812 PRE-PROCEDURAL LABORATORY EXAMINATION: ICD-10-CM

## 2022-08-02 NOTE — TELEPHONE ENCOUNTER
Called patient  Per Dr. Dickson Nail orders in the system for MRI and BMP- Pt verbalized understanding

## 2022-08-02 NOTE — TELEPHONE ENCOUNTER
It should be with and wo contrast.  Also, Patient is over 60 and will need an order for bun and creat. As well.   Thank you

## 2022-08-04 DIAGNOSIS — Z01.812 PRE-PROCEDURAL LABORATORY EXAMINATION: ICD-10-CM

## 2022-08-04 LAB
ANION GAP SERPL CALCULATED.3IONS-SCNC: 13 MMOL/L (ref 7–16)
BUN BLDV-MCNC: 11 MG/DL (ref 6–23)
CALCIUM SERPL-MCNC: 9.3 MG/DL (ref 8.6–10.2)
CHLORIDE BLD-SCNC: 103 MMOL/L (ref 98–107)
CO2: 25 MMOL/L (ref 22–29)
CREAT SERPL-MCNC: 0.8 MG/DL (ref 0.7–1.2)
GFR AFRICAN AMERICAN: >60
GFR NON-AFRICAN AMERICAN: >60 ML/MIN/1.73
GLUCOSE BLD-MCNC: 81 MG/DL (ref 74–99)
POTASSIUM SERPL-SCNC: 4.2 MMOL/L (ref 3.5–5)
SODIUM BLD-SCNC: 141 MMOL/L (ref 132–146)

## 2022-09-21 ENCOUNTER — PROCEDURE VISIT (OUTPATIENT)
Dept: AUDIOLOGY | Age: 62
End: 2022-09-21
Payer: COMMERCIAL

## 2022-09-21 ENCOUNTER — OFFICE VISIT (OUTPATIENT)
Dept: ENT CLINIC | Age: 62
End: 2022-09-21
Payer: COMMERCIAL

## 2022-09-21 VITALS
WEIGHT: 193 LBS | HEART RATE: 56 BPM | DIASTOLIC BLOOD PRESSURE: 91 MMHG | BODY MASS INDEX: 27.63 KG/M2 | HEIGHT: 70 IN | SYSTOLIC BLOOD PRESSURE: 165 MMHG

## 2022-09-21 DIAGNOSIS — H93.13 TINNITUS OF BOTH EARS: Primary | ICD-10-CM

## 2022-09-21 DIAGNOSIS — H90.3 SENSORY HEARING LOSS, BILATERAL: Primary | ICD-10-CM

## 2022-09-21 DIAGNOSIS — H93.13 TINNITUS, BILATERAL: ICD-10-CM

## 2022-09-21 DIAGNOSIS — H90.3 SENSORINEURAL HEARING LOSS, BILATERAL: ICD-10-CM

## 2022-09-21 PROCEDURE — 99204 OFFICE O/P NEW MOD 45 MIN: CPT | Performed by: OTOLARYNGOLOGY

## 2022-09-21 PROCEDURE — 92567 TYMPANOMETRY: CPT | Performed by: AUDIOLOGIST

## 2022-09-21 PROCEDURE — 92557 COMPREHENSIVE HEARING TEST: CPT | Performed by: AUDIOLOGIST

## 2022-09-21 RX ORDER — AZELASTINE 1 MG/ML
2 SPRAY, METERED NASAL 2 TIMES DAILY
Qty: 30 ML | Refills: 1 | Status: SHIPPED | OUTPATIENT
Start: 2022-09-21

## 2022-09-21 NOTE — PROGRESS NOTES
[facility] Otolaryngology  Dr. Juan Rene. KRYSTA Lemus Ms.Ed. New Consult       Patient Name:  Kathy Stephens  :  1960     CHIEF C/O:    Chief Complaint   Patient presents with    Hearing Problem     Tinnitus both ears- have seen improvement since appt was made. HISTORY OBTAINED FROM:    patient    HISTORY OF PRESENT ILLNESS:       Tirso Dailey is a 60y.o. year old male, here today for tinnitis and hearing loss and associated tenderness. Patient underwent full audiogram today which is reviewed. Patient also has a history of MRI, for his tinnitus and hearing loss which was reviewed and was found to be negative for intracranial or IAC pathology. No complaints today no new room spinning vertigo no complaints of ear pain or drainage. No prior history of head trauma no prior history of ear surgeries. No other complaints today of difficulty swallowing change in voice shortness of breath or stridor. Past Medical History:   Diagnosis Date    Alcohol abuse     drinks 8-10 beers daily    BPH with elevated PSA     CAD (coronary artery disease)     Cancer ([facility] [address] 2021    Grade 1 prostrate cancer     Chest pain 11    suggestive of unstable angina    DJD (degenerative joint disease)     right knee     Gastritis /10    on EGD    GERD (gastroesophageal reflux disease)     H/O exercise stress test 11    Treadmill nuclear stress test: Srinivasan protocol. 8 min, 30 sec. 95% max predicted heart rate. Physiologic BP response. Patient developed chest pain at 7 1/2 min into exercise. Had around 2 mm ST segment depressions. Nuclear images read as showing no evidence of stress-induced ischemia    H/O exercise stress test 11    Treadmill nuclear stress test: Srinivasan protocol. 9 min and 30 sec. 101% of max predicted heart rate. Hypertensive BP response. No CP. No ischemic EKG changes. Nuclear images showed soft tissue attenuation artifacts. There did not appear to be any evidence of stress-induced ischemia on study. Gated views did not show any regional wall motion abnormality with possible hyperdynamic LVSF, EF 77%     Hx of cardiovascular stress test 10/29/2019    Nuclear treadmill stress test    Hyperlipidemia     Hypertension     Hypothyroidism     PONV (postoperative nausea and vomiting)     with anesthesia    Thrombocytopenia ([address]     Thyroid disease      Past Surgical History:   Procedure Laterality Date    CARDIAC SURGERY  01/2011    PTCA with stent, LAD    COLONOSCOPY  8/4/11    normal    DIAGNOSTIC CARDIAC CATH LAB PROCEDURE  1/27/11    Normal LV size and systolic function. Elevated LVEDP suggestive of diastolic dysfunction. Mild peak-to-peak gradient across the AV on pullback.  Successful balloon angioplasty with deployment of ROSE to LAD with excellent results with adjunctive Angiomax therapy     HAND SURGERY Left 1978    fracture    KNEE ARTHROPLASTY      KNEE ARTHROSCOPY Right 2006    KNEE ARTHROSCOPY Right 11/14/2019    medial meniscectomy and debridement     KNEE ARTHROSCOPY Right 11/14/2019    RIGHT KNEE ARTHROSCOPY AND DEBRIDEMENT, CHONDROPLASTY AND SYNOVECTOMY performed by Pily Mcdonald DO at [address] [facility]  08/01/2021    ROTATOR CUFF REPAIR Right 12/2010    SEPTOPLASTY  12/2015       Current Outpatient Medications:     sildenafil (REVATIO) 20 MG tablet, TAKE 1 TO 5 TABLETS BY MOUTH AS NEEDED, Disp: , Rfl:     Resveratrol 100 MG CAPS, , Disp: , Rfl:     atenolol (TENORMIN) 25 MG tablet, Take 1 tablet by mouth every morning, Disp: 90 tablet, Rfl: 0    olmesartan-hydroCHLOROthiazide (BENICAR HCT) 40-12.5 MG per tablet, Take 1 tablet by mouth every evening, Disp: 90 tablet, Rfl: 0    amLODIPine (NORVASC) 2.5 MG tablet, Take 1 tablet by mouth every morning, Disp: 90 tablet, Rfl: 0    Zinc 15 MG CAPS, Take 30 mg by mouth daily, Disp: , Rfl:     rosuvastatin (CRESTOR) 20 MG tablet, Take 1 tablet by mouth every other day (Patient taking differently: Take 20 mg by mouth at bedtime Pt taking Monday thru Friday), Disp: 90 tablet, Rfl: 1    Coenzyme Q10 (CO Q 10) 100 MG CAPS, Take by mouth daily , Disp: , Rfl:     aspirin 81 MG tablet, Take 81 mg by mouth daily , Disp: , Rfl:     Multiple Vitamins-Minerals (MULTIVITAL-M PO), Take  by mouth. Woody smith, Disp: , Rfl:   Patient has no known allergies. Social History     Tobacco Use    Smoking status: Never    Smokeless tobacco: Never   Vaping Use    Vaping Use: Never used   Substance Use Topics    Alcohol use: Yes     Comment: 6-8 beers daily. 1 1/2 daily of cups of coffee    Drug use: No     Family History   Problem Relation Age of Onset    Coronary Art Dis Mother         presently 80     Heart Surgery Mother     Hypertension Mother     Heart Surgery Father 76        Aortic aneurysm  post op     Hypertension Father     Thyroid Disease Father     Other Father         adbominal aortic aneurysm     High Blood Pressure Father     High Cholesterol Father     Hypertension Sister     Hypertension Brother     Hypertension Brother        Review of Systems   Constitutional:  Negative for chills and fever. HENT:  Negative for ear discharge and hearing loss. Respiratory:  Negative for cough and shortness of breath. Cardiovascular:  Negative for chest pain and palpitations. Gastrointestinal:  Negative for vomiting. Skin:  Negative for rash. Allergic/Immunologic: Negative for environmental allergies. Neurological:  Negative for dizziness and headaches. Hematological:  Does not bruise/bleed easily. All other systems reviewed and are negative. BP (!) 165/91   Pulse 56   Ht 5' 10\" (1.778 m)   Wt 193 lb (87.5 kg)   BMI 27.69 kg/m²   Physical Exam  Vitals and nursing note reviewed. Constitutional:       Appearance: He is well-developed. HENT:      Head: Normocephalic and atraumatic. Right Ear: Tympanic membrane and ear canal normal.      Left Ear: Tympanic membrane and ear canal normal.      Nose: No congestion or rhinorrhea. Eyes:      Pupils: Pupils are equal, round, and reactive to light. Neck:      Thyroid: No thyromegaly. Trachea: No tracheal deviation. Cardiovascular:      Rate and Rhythm: Normal rate. Pulmonary:      Effort: Pulmonary effort is normal. No respiratory distress. Musculoskeletal:         General: Normal range of motion. Cervical back: Normal range of motion. Lymphadenopathy:      Cervical: No cervical adenopathy. Skin:     General: Skin is warm. Findings: No erythema. Neurological:      Mental Status: He is alert. Cranial Nerves: No cranial nerve deficit. IMPRESSION/PLAN:  Patient seen exam for history of intermittent episodes of hearing loss subjectively, found to have a normal audiogram and MRI today. He may follow-up for hearing loss changes or further concerns in the future. Excellent also patient will continue intranasal Astelin for chronic management postnasal drainage with congestion. Dr. Skipper Pert D. Bunevich D.O. Ms. Elly Sandifer Otolaryngology/Facial Plastic Surgery Residency  Associate Clinical Professor:  [facility]

## 2022-09-21 NOTE — PROGRESS NOTES
This patient was referred for audiometric and tympanometric testing by Dr. Chyna Perez due to tinnitus and a marginal decrease in hearing sensitivity, bilaterally. Audiometry using pure tone air and bone conduction testing revealed a mild  sensorineural hearing loss, through the frequency range, bilaterally. Reliability was good. Speech reception thresholds were in good agreement with the pure tone averages, bilaterally. Speech discrimination scores were 96%, bilaterally at 55-60dBHL. Tympanometry revealed normal middle ear peak pressure and compliance, bilaterally. Ipsilateral acoustic reflexes were absent, bilaterally at 1000Hz. The results were reviewed with the patient. Recommendations for follow up will be made pending physician consult.     Makayla Garcia CCC/NEGRITA  Audiologist  [id number]  NPI#:  [id number]      Electronically signed by Fly Wakefield on 9/21/2022 at 12:33 PM

## 2022-10-13 ASSESSMENT — ENCOUNTER SYMPTOMS
VOMITING: 0
SHORTNESS OF BREATH: 0
COUGH: 0

## 2022-10-24 ENCOUNTER — OFFICE VISIT (OUTPATIENT)
Dept: FAMILY MEDICINE CLINIC | Age: 62
End: 2022-10-24
Payer: COMMERCIAL

## 2022-10-24 VITALS
TEMPERATURE: 97.3 F | SYSTOLIC BLOOD PRESSURE: 138 MMHG | DIASTOLIC BLOOD PRESSURE: 84 MMHG | BODY MASS INDEX: 27.93 KG/M2 | HEIGHT: 70 IN | OXYGEN SATURATION: 100 % | WEIGHT: 195.1 LBS | HEART RATE: 71 BPM

## 2022-10-24 DIAGNOSIS — C61 PROSTATE CANCER (HCC): ICD-10-CM

## 2022-10-24 DIAGNOSIS — E78.5 HYPERLIPIDEMIA, UNSPECIFIED HYPERLIPIDEMIA TYPE: ICD-10-CM

## 2022-10-24 DIAGNOSIS — I10 ESSENTIAL HYPERTENSION: Primary | ICD-10-CM

## 2022-10-24 DIAGNOSIS — E03.9 HYPOTHYROIDISM, UNSPECIFIED TYPE: ICD-10-CM

## 2022-10-24 PROCEDURE — 99214 OFFICE O/P EST MOD 30 MIN: CPT | Performed by: INTERNAL MEDICINE

## 2022-10-24 RX ORDER — ROSUVASTATIN CALCIUM 20 MG/1
20 TABLET, COATED ORAL NIGHTLY
Qty: 90 TABLET | Refills: 0 | Status: SHIPPED | OUTPATIENT
Start: 2022-10-24 | End: 2023-01-22

## 2022-10-24 RX ORDER — MULTIVIT-MIN/IRON/FOLIC ACID/K 18-600-40
CAPSULE ORAL
COMMUNITY

## 2022-10-24 RX ORDER — OLMESARTAN MEDOXOMIL AND HYDROCHLOROTHIAZIDE 40/12.5 40; 12.5 MG/1; MG/1
1 TABLET ORAL EVERY EVENING
Qty: 90 TABLET | Refills: 0 | Status: SHIPPED | OUTPATIENT
Start: 2022-10-24 | End: 2023-01-22

## 2022-10-24 RX ORDER — AMLODIPINE BESYLATE 5 MG/1
5 TABLET ORAL EVERY MORNING
Qty: 90 TABLET | Refills: 0 | Status: SHIPPED | OUTPATIENT
Start: 2022-10-24 | End: 2023-01-22

## 2022-10-24 RX ORDER — ATENOLOL 25 MG/1
25 TABLET ORAL EVERY MORNING
Qty: 90 TABLET | Refills: 0 | Status: SHIPPED | OUTPATIENT
Start: 2022-10-24 | End: 2023-01-22

## 2022-10-24 SDOH — ECONOMIC STABILITY: FOOD INSECURITY: WITHIN THE PAST 12 MONTHS, YOU WORRIED THAT YOUR FOOD WOULD RUN OUT BEFORE YOU GOT MONEY TO BUY MORE.: NEVER TRUE

## 2022-10-24 SDOH — ECONOMIC STABILITY: FOOD INSECURITY: WITHIN THE PAST 12 MONTHS, THE FOOD YOU BOUGHT JUST DIDN'T LAST AND YOU DIDN'T HAVE MONEY TO GET MORE.: NEVER TRUE

## 2022-10-24 ASSESSMENT — ENCOUNTER SYMPTOMS
ABDOMINAL PAIN: 0
NAUSEA: 0
EYE DISCHARGE: 0
BLOOD IN STOOL: 0
SHORTNESS OF BREATH: 0

## 2022-10-24 ASSESSMENT — SOCIAL DETERMINANTS OF HEALTH (SDOH): HOW HARD IS IT FOR YOU TO PAY FOR THE VERY BASICS LIKE FOOD, HOUSING, MEDICAL CARE, AND HEATING?: NOT HARD AT ALL

## 2022-10-24 NOTE — PROGRESS NOTES
Chief Complaint   Patient presents with    3 Month Follow-Up     Pt states he is here for a 3 month follow up, feeling better headaches come and go. Saw Dr. Caitlin Walker on 09/21/2022      Medication Refill    Health Maintenance     Flu vaccine - pt will get at pharmacy        HPI:  Patient is here for follow-up     Feeling okay    BP - reports many times elevated at home        Allergy and Medications are reviewed and updated. Past Medical History, Surgical History, and Family History has been reviewed and updated. Review of Systems:  Review of Systems   Constitutional:  Negative for chills and fever. HENT:  Negative for congestion and ear pain. Eyes:  Negative for discharge. Respiratory:  Negative for shortness of breath (No new SOB). Cardiovascular:  Negative for chest pain and leg swelling. Gastrointestinal:  Negative for abdominal pain, blood in stool and nausea. Endocrine: Negative for polydipsia. Genitourinary:  Negative for flank pain and hematuria. Musculoskeletal:  Negative for myalgias and neck pain. Skin:  Negative for rash. Neurological:  Negative for dizziness and seizures. Hematological:  Does not bruise/bleed easily. Psychiatric/Behavioral:  Negative for hallucinations and suicidal ideas. Vitals:    10/24/22 1105   BP: 138/84   Position: Sitting   Pulse: 71   Temp: 97.3 °F (36.3 °C)   TempSrc: Temporal   SpO2: 100%   Weight: 195 lb 1.6 oz (88.5 kg)   Height: 5' 10\" (1.778 m)       Physical Exam  Vitals reviewed. Constitutional:       Appearance: He is well-developed. HENT:      Head: Normocephalic and atraumatic. Eyes:      Conjunctiva/sclera: Conjunctivae normal.      Pupils: Pupils are equal, round, and reactive to light. Neck:      Vascular: No JVD. Cardiovascular:      Rate and Rhythm: Normal rate and regular rhythm. Pulmonary:      Effort: Pulmonary effort is normal.      Breath sounds: Normal breath sounds.    Abdominal:      General: Bowel sounds are normal.      Palpations: Abdomen is soft. Musculoskeletal:         General: Normal range of motion. Skin:     General: Skin is warm and dry. Neurological:      Mental Status: He is alert and oriented to person, place, and time.    Psychiatric:         Behavior: Behavior normal.        Labs :    Lab Results   Component Value Date    WBC 6.1 03/31/2022    HGB 16.2 03/31/2022    HCT 47.3 03/31/2022     03/31/2022    CHOL 215 (H) 10/19/2021    TRIG 178 (H) 10/19/2021    HDL 52 03/31/2022    ALT 38 03/31/2022    AST 29 03/31/2022     08/04/2022    K 4.2 08/04/2022     08/04/2022    CREATININE 0.8 08/04/2022    BUN 11 08/04/2022    CO2 25 08/04/2022    TSH 3.590 07/25/2022    PSA 4.92 (H) 09/07/2022    INR 0.9 06/17/2012    GLUF 88 03/31/2022    LABA1C 4.9 04/01/2021     Lab Results   Component Value Date/Time    COLORU YELLOW 01/26/2011 09:40 PM    NITRU NEGATIVE 01/26/2011 09:40 PM    GLUCOSEU NEG 10/04/2021 04:17 PM    GLUCOSEU NEGATIVE 01/26/2011 09:40 PM    KETUA NEG 10/04/2021 04:17 PM    KETUA NEGATIVE 01/26/2011 09:40 PM    UROBILINOGEN 0.2 01/26/2011 09:40 PM    BILIRUBINUR NEG 10/04/2021 04:17 PM    BILIRUBINUR NEGATIVE 01/26/2011 09:40 PM     Lab Results   Component Value Date/Time    PSA 4.92 09/07/2022 08:51 AM             ASSESSMENT     Patient Active Problem List    Diagnosis Date Noted    Accelerated hypertension 03/22/2022    Near syncope 03/21/2022    Pre-syncope 03/21/2022    Hepatic steatosis 02/16/2022    Prostate cancer ([facility] [address] 10/04/2021     Assessment & Plan Note:      Monitored by specialist- no acute findings meriting change in the plan     Active Surveillance Urologist , 3+3 =6, low volume       Dizziness 10/04/2021    Elevated liver enzymes 10/04/2021    Essential hypertension 10/04/2021    Complex tear of medial meniscus of right knee as current injury 11/14/2019    ALT (SGPT) level raised 11/15/2018    Primary osteoarthritis of right knee 08/29/2017    Right hip tendonitis 06/27/2016    Trochanteric bursitis, right hip 02/15/2016    Deviated septum 12/16/2015    Nasal turbinate hypertrophy 12/16/2015    Nasal congestion 12/16/2015    Chronic maxillary sinusitis 12/16/2015    History of PTCA 10/01/2013    Hyperlipidemia 10/01/2013    Hypothyroidism 10/01/2013    DJD (degenerative joint disease) 10/01/2013    ETOH abuse 10/01/2013    Tubular adenoma of colon 10/01/2013     Overview Note:     h/o      H/O gastritis 10/01/2013    Blunt eye injury, bilateral 06/17/2012    Facial injury 06/17/2012    Hematoma of thigh 06/17/2012    CAD (coronary artery disease) 06/17/2012    HTN (hypertension) 06/17/2012    Glenoid labral tear 01/13/2011    Shoulder impingement 01/13/2011    Rotator cuff tear 01/13/2011        Diagnosis:   1. Essential hypertension  -     amLODIPine (NORVASC) 5 MG tablet; Take 1 tablet by mouth every morning, Disp-90 tablet, R-0Print  -     olmesartan-hydroCHLOROthiazide (BENICAR HCT) 40-12.5 MG per tablet; Take 1 tablet by mouth every evening, Disp-90 tablet, R-0Print  -     atenolol (TENORMIN) 25 MG tablet; Take 1 tablet by mouth every morning, Disp-90 tablet, R-0Print  2. Hyperlipidemia, unspecified hyperlipidemia type  -     rosuvastatin (CRESTOR) 20 MG tablet; Take 1 tablet by mouth at bedtime Pt taking Monday thru Friday, Disp-90 tablet, R-0Print  3. Hypothyroidism, unspecified type  Comments:  Off medication  4. Prostate cancer [facility])  Assessment & Plan:   Monitored by specialist- no acute findings meriting change in the plan     Active Surveillance Urologist , 3+3 =6, low volume          PLAN:       Add Norvasc 5 mg daily(increase from 2.5 mg)    Pt is stable on current medical treatment. Continue current treatment plan    Side effects/Adverse effects/Precautions are reviewed with patient.        Low salt, Low Carb diet an low fat diet  Continue medications as advised and take them regularly  Regular exercises as advised    Discussed natural and expected course of this diagnosis and need to alert me if symptoms do not follow expected course, or if any worse. Smoking cessation if applicable, discussed with patient. Active surveillance from Urologist for [place] Prostate     Had Hormone levels checked by his own - Testosterone, Free Testerone - Normal     Scan in system     Patient Instructions   The medication list included in this document is our record of what you are currently taking, including any changes that were made at today's visit. If you find any differences when compared to your medications at home, or have any questions that were not answered at your visit, please contact the office. Return in about 3 months (around 1/24/2023).

## 2022-10-24 NOTE — ASSESSMENT & PLAN NOTE
Monitored by specialist- no acute findings meriting change in the plan     Active Surveillance Urologist , 3+3 =6, low volume

## 2023-01-09 RX ORDER — AZELASTINE 1 MG/ML
2 SPRAY, METERED NASAL 2 TIMES DAILY
Qty: 30 ML | Refills: 1 | Status: SHIPPED
Start: 2023-01-09 | End: 2023-02-23

## 2023-02-01 RX ORDER — AZELASTINE 1 MG/ML
2 SPRAY, METERED NASAL 2 TIMES DAILY
Qty: 30 ML | Refills: 5 | Status: SHIPPED
Start: 2023-02-01 | End: 2023-03-28

## 2023-02-23 ENCOUNTER — OFFICE VISIT (OUTPATIENT)
Dept: CARDIOLOGY CLINIC | Age: 63
End: 2023-02-23
Payer: COMMERCIAL

## 2023-02-23 VITALS
WEIGHT: 192 LBS | SYSTOLIC BLOOD PRESSURE: 128 MMHG | HEART RATE: 60 BPM | BODY MASS INDEX: 27.49 KG/M2 | HEIGHT: 70 IN | RESPIRATION RATE: 16 BRPM | DIASTOLIC BLOOD PRESSURE: 80 MMHG

## 2023-02-23 DIAGNOSIS — M15.9 PRIMARY OSTEOARTHRITIS INVOLVING MULTIPLE JOINTS: ICD-10-CM

## 2023-02-23 DIAGNOSIS — R74.01 ELEVATED ALT MEASUREMENT: ICD-10-CM

## 2023-02-23 DIAGNOSIS — F10.10 ALCOHOL ABUSE: ICD-10-CM

## 2023-02-23 DIAGNOSIS — Z98.890 HX OF ARTHROSCOPY OF RIGHT KNEE: ICD-10-CM

## 2023-02-23 DIAGNOSIS — I25.10 CAD IN NATIVE ARTERY: Primary | ICD-10-CM

## 2023-02-23 DIAGNOSIS — E78.00 HYPERCHOLESTEREMIA: ICD-10-CM

## 2023-02-23 DIAGNOSIS — R74.01 ELEVATED AST (SGOT): ICD-10-CM

## 2023-02-23 DIAGNOSIS — I10 ESSENTIAL HYPERTENSION: ICD-10-CM

## 2023-02-23 DIAGNOSIS — Z86.39 H/O: HYPOTHYROIDISM: ICD-10-CM

## 2023-02-23 DIAGNOSIS — Z79.899 ON STATIN THERAPY: ICD-10-CM

## 2023-02-23 DIAGNOSIS — Z98.61 HISTORY OF PTCA: ICD-10-CM

## 2023-02-23 PROCEDURE — 3078F DIAST BP <80 MM HG: CPT | Performed by: INTERNAL MEDICINE

## 2023-02-23 PROCEDURE — 99214 OFFICE O/P EST MOD 30 MIN: CPT | Performed by: INTERNAL MEDICINE

## 2023-02-23 PROCEDURE — 3074F SYST BP LT 130 MM HG: CPT | Performed by: INTERNAL MEDICINE

## 2023-02-23 PROCEDURE — 93000 ELECTROCARDIOGRAM COMPLETE: CPT | Performed by: INTERNAL MEDICINE

## 2023-02-24 ENCOUNTER — PATIENT MESSAGE (OUTPATIENT)
Dept: FAMILY MEDICINE CLINIC | Age: 63
End: 2023-02-24

## 2023-02-24 DIAGNOSIS — E78.5 HYPERLIPIDEMIA, UNSPECIFIED HYPERLIPIDEMIA TYPE: ICD-10-CM

## 2023-02-24 DIAGNOSIS — I10 ESSENTIAL HYPERTENSION: ICD-10-CM

## 2023-02-24 NOTE — TELEPHONE ENCOUNTER
From: Jaun Arthur  To: Dr. Ilir Chakraborty: 2/24/2023 3:29 PM EST  Subject: Atenolol 25, Amlodipine 5, Rosuvastatin 20    Hello, I will be needing refills on the above within the next 10 days. A 30 day supply will suffice as my rescheduled appointment is March 29.  Thank you, Viktoriya Garcia

## 2023-02-24 NOTE — PROGRESS NOTES
OFFICE VISIT        PRIMARY CARE PHYSICIAN:    Kitty Allen MD       ALLERGIES / SENSITIVITIES:    No Known Allergies       REVIEWED MEDICATIONS:      Current Outpatient Medications:     Cholecalciferol (VITAMIN D) 50 MCG (2000 UT) CAPS capsule, Take by mouth, Disp: , Rfl:     amLODIPine (NORVASC) 5 MG tablet, Take 1 tablet by mouth every morning, Disp: 90 tablet, Rfl: 0    rosuvastatin (CRESTOR) 20 MG tablet, Take 1 tablet by mouth at bedtime Pt taking Monday thru Friday, Disp: 90 tablet, Rfl: 0    olmesartan-hydroCHLOROthiazide (BENICAR HCT) 40-12.5 MG per tablet, Take 1 tablet by mouth every evening, Disp: 90 tablet, Rfl: 0    atenolol (TENORMIN) 25 MG tablet, Take 1 tablet by mouth every morning, Disp: 90 tablet, Rfl: 0    azelastine (ASTELIN) 0.1 % nasal spray, 2 sprays by Nasal route 2 times daily Use in each nostril as directed, Disp: 30 mL, Rfl: 1    sildenafil (REVATIO) 20 MG tablet, TAKE 1 TO 5 TABLETS BY MOUTH AS NEEDED, Disp: , Rfl:     Resveratrol 100 MG CAPS, daily, Disp: , Rfl:     Zinc 15 MG CAPS, Take 30 mg by mouth daily, Disp: , Rfl:     Coenzyme Q10 (CO Q 10) 100 MG CAPS, Take by mouth daily , Disp: , Rfl:     aspirin 81 MG tablet, Take 81 mg by mouth daily , Disp: , Rfl:     Multiple Vitamins-Minerals (MULTIVITAL-M PO), Take  by mouth. Woody vits, Disp: , Rfl:     azelastine (ASTELIN) 0.1 % nasal spray, 2 sprays by Nasal route 2 times daily Use in each nostril as directed, Disp: 30 mL, Rfl: 5      S: REASON FOR VISIT:    Coronary artery diseaseArsalan Mathur is a pleasant, 58-year-old male with cardiovascular history as described below. He was last seen by me in the office on 2/24/2022. He had a stress test on 3/1/2022, which was normal.  He was hospitalized overnight from 3/21/2022 to 3/22/2022 because of episodes of lightheadedness and near syncope associated with elevated blood pressure. His medications were adjusted and he was started on hydralazine, which he later discontinued.   He had an echocardiogram done during his hospital stay and also had a cardiac event monitor applied for 1 day after discharge. He thinks that his problems were related to him taking thyroid on an empty stomach increasing absorption. Of note, his free T4 at that time was elevated. He stopped taking thyroid replacement and repeat thyroid function tests in July were normal.  He reports occasional brief palpitations. He denies exertional chest pain or significant exertional dyspnea. He denies orthopnea, PND's or lower extremity swelling. He denies any recent dizziness, presyncope or syncope. He continues to drink alcohol heavily. REVIEW OF SYSTEMS:     CONSTITUTIONAL: Denies fevers, chills or night sweats. Denies fatigue. HEENT: Denies unusual headaches. Denies changes in hearing or vision. Denies dysphagia, hoarseness, hemoptysis, hematemesis, or epistaxis. He complains of recurrent sinus congestion. ENDOCRINE: He denies polyphagia, polyuria or polydipsia. He denies heat or cold intolerance. MUSCULOSKELETAL: He has right knee arthritis and related aches and pains, but his pain has been controlled since receiving an injection on 12/31/2019. SKIN: Denies unusual skin rashes, skin ulcers, or itching. HEME/LYMPH: Denies lymphadenopathy, bleeding or easy bruisability. HEART: As above. LUNGS: Denies significant cough or sputum production. GI: Denies abdominal pain, nausea, vomiting, diarrhea, constipation, rectal bleeding, or tarry stools. : Denies hematuria or dysuria. PSYCHIATRIC: Denies mood changes, anxiety or depression. NEUROLOGIC: Denies numbness, tingling or tremors. Denies any motor deficits. Denies memory problems. CARDIOVASCULAR HISTORY:   1. Hypertension. 2. Hyperlipidemia. 3. Coronary artery disease. 1/26/11: Admitted with chest pain suggestive of unstable angina. 1/26/11: Treadmill nuclear stress test: Srinivasan protocol. 8 minutes and 30 seconds.  95% of maximum predicted heart rate. Physiologic blood pressure response. Patient developed chest pain at 7 ½ minutes into exercise. He did have around 2 mm ST segment depressions. Nuclear images were read as showing no evidence of stress-induced ischemia. 1/27/11: Cardiac catheterization and angioplasty: Left main: No significant disease. LAD: 95% subtotal occlusion in the proximal third of the vessel. Intermediate ramus: No significant disease. LCX: No significant disease. RCA: No significant disease. Normal left ventricular size and systolic function. Elevated left ventricular end-diastolic pressure suggestive of diastolic dysfunction. Mild peak-to-peak gradient across the aortic valve on pullback. Successful balloon angioplasty with deployment of a drug-eluting coronary stent to the LAD with excellent results with adjunctive Angiomax therapy. 1/3/2017: Treadmill nuclear stress test: Srinivasan protocol. 10 minutes. 101% of the maximum predicted heart rate. Hypertensive blood pressure response. No chest pain. No ischemic EKG changes and no arrhythmias. Nuclear images were within normal limits showing mild diaphragmatic attenuation artifact with no convincing evidence of any scars or any stress induced ischemia with the gated views showing no regional wall motion abnormality with a calculated ejection fraction of 76%. Treadmill nuclear stress test done at [facility] done on 10/29/2019, at which time he reportedly achieved 100% of the maximum predicted heart rate, which showed no evidence of stress-induced ischemia with a calculated ejection fraction of 82%. Treadmill nuclear stress test done on 3/1/2022: Srinivasan protocol, 9 minutes  100% of the maximum predicted heart rate. Hypertensive blood pressure response. 10 METS. No chest pain. No arrhythmias. No ischemic EKG changes. Nuclear images were also normal.  The gated views showed normal myocardial thickening and wall motion with a calculated ejection fraction of 64%. 4. Carotid US, 2015. Mild plaque in both carotid bulbs and proximal internal carotid arteries suggestive of 1-15% stenosis. Normal bilateral common carotid arteries. Normal bilateral external carotid arteries. Antegrade flow in both vertebral arteries. 5.  Echocardiogram done on 3/22/2022 was read as showing an ejection fraction of 60-65% with normal right ventricular size or function, mild left atrial dilatation, mild tricuspid regurgitation with RVSP of 33 mmHg. 6.  18 hour event monitor from 3/24/2022 to 3/25/2022 showed an 8 beat run of SVT with rare PAC's and rare PVC's. PAST MEDICAL HISTORY:  As under cardiovascular history. Thrombocytopenia. Hypothyroidism. DJD. Status post right knee arthroscopy in . Status post surgical repair of left hand fracture in . Status post repair of a torn right rotator cuff in 12/10. Tubular adenoma on colonoscopy in . Repeat colonoscopy on 11 was normal.  Colonoscopy 2017: Small hyperplastic rectal polyps. One of them was sampled:  Reported negative pathology. Next examination in 10 years. Gastritis on EGD in 2/10. Alcohol abuse: Patient drinks 8-10 beers daily. History of deviated nasal septum, S/P surgery in 2015. Left arm biceps partial tear (after lifting/moving a heavy TV set) on MRI done on 2018. Right knee arthroscopy with tri compartmental synovectomy, chondroplasty of MFC/patella/LFC on 2019. Right knee injection with Damaris Lupis, 2019. Elevated hepatocellular enzymes. FAMILY HISTORY:  Mother  at age 80:  had a history of coronary artery disease and was status post stenting. She had a history of  hypertension and diabetes mellitus. Father  at age 76 from post-operative complications post abdominal aortic aneurysm repair: Had hypertension and hypothyroidism. SOCIAL HISTORY:  Patient does not smoke. He drinks 8-10 beers a day. 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   O:  COMPLETE PHYSICAL EXAM:      /80   Pulse 60   Resp 16   Ht 5' 10\" (1.778 m)   Wt 192 lb (87.1 kg)   BMI 27.55 kg/m²      General:                       Well-developed, well-nourished male in no distress.  Head & Neck:               Atraumatic, normocephalic head. No JVD. No carotid bruits. Carotid upstrokes normal bilaterally. No thyromegaly.                                          Sclerae not icteric. No xanthelasmas. Mucous membranes moist.   Chest:                          Symmetrical and nontender. No deformities.  Lungs:                         Clear bilaterally.  Heart:                          Normal S1 and S2. No S3 or S4. No murmurs or rubs.  Abdomen:                    Soft, nontender without organomegaly or masses. No bruits. Normal bowel sounds.  Extremities:                 No edema. Dorsalis pedis and posterior tibialis pulses normal bilaterally.  Skin:                            Normal turgor. No rashes or ulcers detected.  Neuro:                                     Oriented x3. No motor or sensory deficit detected.           REVIEW OF DIAGNOSTIC TESTS:     Records, imaging studies, and blood tests from the hospitalization from 3/2022 reviewed:  in Epic.    Lipid profile from 3/31/2022 reviewed:  total cholesterol 161, triglycerides 112, HDL 52, LDL 87, CBC unremarkable.    TSH and free T4 from 7/25/2022:  3.59 and 1.25 respectively (normal).    BMP from 8/4/2022 normal.    PSA from 9/7/2022:  4.92 (elevated).    EKG done today showed sinus rhythm with low QRS voltages in the limb leads but was otherwise unremarkable.        ASSESSMENT / DIAGNOSIS:   Coronary artery disease: Clinically stable.   Hypertension: Adequately controlled on today's measurement.   Hypercholesterolemia: Adequately controlled on statin therapy.  History of hypothyroidism with normal thyroid function tests.  Off thyroid replacement.     DJD.  Alcohol abuse.  History of elevated hepatocellular enzymes.    Right  knee arthritis. TREATMENT PLAN:   Reassure. Continue current cardiac medications. Anai Giordano will cut down on his alcohol consumption. Lawerence Sleeper was advised to try to remain active as is and to follow a heart-healthy diet. ValSalva maneuvers discussed for possible episodes of PSVT. May take an extra half to 1 Atenolol tablet if he experienced sustained rapid heartbeat. Follow up with Cardiology in 1 year or on a prn basis. Dakota Raygoza.  [place] [phone]

## 2023-02-27 RX ORDER — ROSUVASTATIN CALCIUM 20 MG/1
20 TABLET, COATED ORAL NIGHTLY
Qty: 30 TABLET | Refills: 0 | Status: SHIPPED | OUTPATIENT
Start: 2023-02-27 | End: 2023-05-28

## 2023-02-27 RX ORDER — AMLODIPINE BESYLATE 5 MG/1
5 TABLET ORAL EVERY MORNING
Qty: 30 TABLET | Refills: 0 | Status: SHIPPED | OUTPATIENT
Start: 2023-02-27 | End: 2023-05-28

## 2023-02-27 RX ORDER — ATENOLOL 25 MG/1
25 TABLET ORAL EVERY MORNING
Qty: 30 TABLET | Refills: 0 | Status: SHIPPED | OUTPATIENT
Start: 2023-02-27 | End: 2023-05-28

## 2023-03-02 ENCOUNTER — TELEPHONE (OUTPATIENT)
Dept: FAMILY MEDICINE CLINIC | Age: 63
End: 2023-03-02

## 2023-03-02 DIAGNOSIS — I10 PRIMARY HYPERTENSION: ICD-10-CM

## 2023-03-02 DIAGNOSIS — E78.00 PURE HYPERCHOLESTEROLEMIA: ICD-10-CM

## 2023-03-02 DIAGNOSIS — I25.10 CORONARY ARTERY DISEASE INVOLVING NATIVE CORONARY ARTERY OF NATIVE HEART WITHOUT ANGINA PECTORIS: Primary | ICD-10-CM

## 2023-03-02 NOTE — TELEPHONE ENCOUNTER
Called pt and pt said he'd like to have his routine bloodwork done prior to his appointment--said Dr Angie Mcdaniel prefers it that way. Pt also said he does not need to have a PSA ordered because his urologist orders that.

## 2023-03-02 NOTE — TELEPHONE ENCOUNTER
Called patient to change appointment date/time FROM 03/29/2023 at 10:15 TO 03/28/2023 at 10:30 am. LMM for patient to call back and confirm

## 2023-03-02 NOTE — TELEPHONE ENCOUNTER
----- Message from Randi Rivas sent at 3/2/2023 11:14 AM EST -----  Subject: Message to Provider    QUESTIONS  Information for Provider? Patient is fine with changing the appointment to   3/28. Patient says he sent a message in [address] to renew his medications   (Amlodipine, Rosuvastatin and Atenolol) that will run out before that next   appointment. Wants to know if he can even get a 30 day supply until   appointment. Also, he normally gets labs before appointment (says Dr. Francisco Javier Howe   prefers this) and wants to know if he can do that.  Please call to advise.   ---------------------------------------------------------------------------  --------------  Rachel BRANTLEY  [phone]; OK to leave message on voicemail  ---------------------------------------------------------------------------  --------------  SCRIPT ANSWERS  undefined

## 2023-03-13 DIAGNOSIS — I10 PRIMARY HYPERTENSION: ICD-10-CM

## 2023-03-13 DIAGNOSIS — E78.00 PURE HYPERCHOLESTEROLEMIA: ICD-10-CM

## 2023-03-13 LAB
ALBUMIN SERPL-MCNC: 4.9 G/DL (ref 3.5–5.2)
ALP BLD-CCNC: 64 U/L (ref 40–129)
ALT SERPL-CCNC: 46 U/L (ref 0–40)
ANION GAP SERPL CALCULATED.3IONS-SCNC: 11 MMOL/L (ref 7–16)
AST SERPL-CCNC: 35 U/L (ref 0–39)
BILIRUB SERPL-MCNC: 0.6 MG/DL (ref 0–1.2)
BUN BLDV-MCNC: 11 MG/DL (ref 6–23)
CALCIUM SERPL-MCNC: 9.6 MG/DL (ref 8.6–10.2)
CHLORIDE BLD-SCNC: 102 MMOL/L (ref 98–107)
CHOLESTEROL, TOTAL: 190 MG/DL (ref 0–199)
CO2: 28 MMOL/L (ref 22–29)
CREAT SERPL-MCNC: 0.8 MG/DL (ref 0.7–1.2)
GFR SERPL CREATININE-BSD FRML MDRD: >60 ML/MIN/1.73
GLUCOSE BLD-MCNC: 83 MG/DL (ref 74–99)
HCT VFR BLD CALC: 47.7 % (ref 37–54)
HDLC SERPL-MCNC: 53 MG/DL
HEMOGLOBIN: 16.5 G/DL (ref 12.5–16.5)
LDL CHOLESTEROL CALCULATED: 79 MG/DL (ref 0–99)
MCH RBC QN AUTO: 32.9 PG (ref 26–35)
MCHC RBC AUTO-ENTMCNC: 34.6 % (ref 32–34.5)
MCV RBC AUTO: 95.2 FL (ref 80–99.9)
PDW BLD-RTO: 13 FL (ref 11.5–15)
PLATELET # BLD: 130 E9/L (ref 130–450)
PMV BLD AUTO: 11.6 FL (ref 7–12)
POTASSIUM SERPL-SCNC: 3.9 MMOL/L (ref 3.5–5)
RBC # BLD: 5.01 E12/L (ref 3.8–5.8)
SODIUM BLD-SCNC: 141 MMOL/L (ref 132–146)
TOTAL PROTEIN: 7 G/DL (ref 6.4–8.3)
TRIGL SERPL-MCNC: 290 MG/DL (ref 0–149)
TSH SERPL DL<=0.05 MIU/L-ACNC: 5.88 UIU/ML (ref 0.27–4.2)
VLDLC SERPL CALC-MCNC: 58 MG/DL
WBC # BLD: 6.1 E9/L (ref 4.5–11.5)

## 2023-03-28 ENCOUNTER — OFFICE VISIT (OUTPATIENT)
Dept: FAMILY MEDICINE CLINIC | Age: 63
End: 2023-03-28
Payer: COMMERCIAL

## 2023-03-28 VITALS
OXYGEN SATURATION: 98 % | WEIGHT: 201 LBS | SYSTOLIC BLOOD PRESSURE: 128 MMHG | HEIGHT: 70 IN | BODY MASS INDEX: 28.77 KG/M2 | HEART RATE: 73 BPM | DIASTOLIC BLOOD PRESSURE: 72 MMHG | TEMPERATURE: 97.5 F

## 2023-03-28 DIAGNOSIS — I10 ESSENTIAL HYPERTENSION: ICD-10-CM

## 2023-03-28 DIAGNOSIS — I10 PRIMARY HYPERTENSION: Primary | ICD-10-CM

## 2023-03-28 DIAGNOSIS — C61 PROSTATE CANCER (HCC): ICD-10-CM

## 2023-03-28 DIAGNOSIS — E03.9 HYPOTHYROIDISM, UNSPECIFIED TYPE: ICD-10-CM

## 2023-03-28 DIAGNOSIS — E78.5 HYPERLIPIDEMIA, UNSPECIFIED HYPERLIPIDEMIA TYPE: ICD-10-CM

## 2023-03-28 DIAGNOSIS — D69.6 THROMBOCYTOPENIA (HCC): ICD-10-CM

## 2023-03-28 DIAGNOSIS — I70.1 RENAL ARTERY ATHEROSCLEROSIS (HCC): ICD-10-CM

## 2023-03-28 PROCEDURE — 3078F DIAST BP <80 MM HG: CPT | Performed by: INTERNAL MEDICINE

## 2023-03-28 PROCEDURE — 3074F SYST BP LT 130 MM HG: CPT | Performed by: INTERNAL MEDICINE

## 2023-03-28 PROCEDURE — 99214 OFFICE O/P EST MOD 30 MIN: CPT | Performed by: INTERNAL MEDICINE

## 2023-03-28 RX ORDER — OLMESARTAN MEDOXOMIL AND HYDROCHLOROTHIAZIDE 40/12.5 40; 12.5 MG/1; MG/1
1 TABLET ORAL EVERY EVENING
Qty: 90 TABLET | Refills: 0 | Status: SHIPPED | OUTPATIENT
Start: 2023-03-28 | End: 2023-06-26

## 2023-03-28 RX ORDER — ATENOLOL 25 MG/1
25 TABLET ORAL EVERY MORNING
Qty: 90 TABLET | Refills: 0 | Status: SHIPPED | OUTPATIENT
Start: 2023-03-28 | End: 2023-06-26

## 2023-03-28 RX ORDER — AMLODIPINE BESYLATE 5 MG/1
5 TABLET ORAL EVERY MORNING
Qty: 90 TABLET | Refills: 0 | Status: SHIPPED | OUTPATIENT
Start: 2023-03-28 | End: 2023-06-26

## 2023-03-28 RX ORDER — ROSUVASTATIN CALCIUM 20 MG/1
20 TABLET, COATED ORAL NIGHTLY
Qty: 90 TABLET | Refills: 0 | Status: SHIPPED | OUTPATIENT
Start: 2023-03-28 | End: 2023-06-26

## 2023-03-28 SDOH — ECONOMIC STABILITY: FOOD INSECURITY: WITHIN THE PAST 12 MONTHS, THE FOOD YOU BOUGHT JUST DIDN'T LAST AND YOU DIDN'T HAVE MONEY TO GET MORE.: NEVER TRUE

## 2023-03-28 SDOH — ECONOMIC STABILITY: FOOD INSECURITY: WITHIN THE PAST 12 MONTHS, YOU WORRIED THAT YOUR FOOD WOULD RUN OUT BEFORE YOU GOT MONEY TO BUY MORE.: NEVER TRUE

## 2023-03-28 SDOH — ECONOMIC STABILITY: INCOME INSECURITY: HOW HARD IS IT FOR YOU TO PAY FOR THE VERY BASICS LIKE FOOD, HOUSING, MEDICAL CARE, AND HEATING?: NOT HARD AT ALL

## 2023-03-28 SDOH — ECONOMIC STABILITY: HOUSING INSECURITY
IN THE LAST 12 MONTHS, WAS THERE A TIME WHEN YOU DID NOT HAVE A STEADY PLACE TO SLEEP OR SLEPT IN A SHELTER (INCLUDING NOW)?: NO

## 2023-03-28 ASSESSMENT — PATIENT HEALTH QUESTIONNAIRE - PHQ9
SUM OF ALL RESPONSES TO PHQ QUESTIONS 1-9: 0
SUM OF ALL RESPONSES TO PHQ QUESTIONS 1-9: 0
1. LITTLE INTEREST OR PLEASURE IN DOING THINGS: 0
2. FEELING DOWN, DEPRESSED OR HOPELESS: 0
SUM OF ALL RESPONSES TO PHQ QUESTIONS 1-9: 0
SUM OF ALL RESPONSES TO PHQ9 QUESTIONS 1 & 2: 0
SUM OF ALL RESPONSES TO PHQ QUESTIONS 1-9: 0

## 2023-03-28 ASSESSMENT — ENCOUNTER SYMPTOMS
EYE DISCHARGE: 0
NAUSEA: 0
SHORTNESS OF BREATH: 0
ABDOMINAL PAIN: 0
BLOOD IN STOOL: 0

## 2023-03-28 NOTE — PROGRESS NOTES
Chief Complaint   Patient presents with    Hypertension     Pt here for follow up on hypertension, pt reports feeling pretty good     Discuss Labs     Review labs from 03/13/2023     Health Maintenance     Flu vaccine - pt declined        HPI:  Patient is here for follow-up      Patient is feeling well. Patient had seen Dr. Wan-cardiology in February. His note is reviewed. Patient is following with [facility] urology Dr. Osman Brock. He is on active surveillance for his prostate cancer    Allergy and Medications are reviewed and updated. Past Medical History, Surgical History, and Family History has been reviewed and updated. Review of Systems:  Review of Systems   Constitutional:  Negative for chills and fever. HENT:  Negative for congestion and ear pain. Eyes:  Negative for discharge. Respiratory:  Negative for shortness of breath (No new SOB). Cardiovascular:  Negative for chest pain and leg swelling. Gastrointestinal:  Negative for abdominal pain, blood in stool and nausea. Endocrine: Negative for polydipsia. Genitourinary:  Negative for flank pain and hematuria. Musculoskeletal:  Negative for myalgias and neck pain. Skin:  Negative for rash. Neurological:  Negative for dizziness and seizures. Hematological:  Does not bruise/bleed easily. Psychiatric/Behavioral:  Negative for hallucinations and suicidal ideas. Vitals:    03/28/23 1029   BP: 128/72   Position: Sitting   Pulse: 73   Temp: 97.5 °F (36.4 °C)   TempSrc: Temporal   SpO2: 98%   Weight: 201 lb (91.2 kg)   Height: 5' 10\" (1.778 m)       Physical Exam  Vitals reviewed. Constitutional:       Appearance: He is well-developed. HENT:      Head: Normocephalic and atraumatic. Eyes:      Conjunctiva/sclera: Conjunctivae normal.      Pupils: Pupils are equal, round, and reactive to light. Neck:      Vascular: No JVD. Cardiovascular:      Rate and Rhythm: Normal rate and regular rhythm.    Pulmonary:      Effort:

## 2023-03-29 PROBLEM — D69.6 THROMBOCYTOPENIA (HCC): Status: ACTIVE | Noted: 2023-03-29

## 2023-03-29 PROBLEM — I70.1 RENAL ARTERY ATHEROSCLEROSIS (HCC): Status: ACTIVE | Noted: 2023-03-29

## 2023-03-29 NOTE — ASSESSMENT & PLAN NOTE
Discussed with the patient.   Patient reports that renal artery stenosis was ruled out by Dr. Deb Evans radiologist at [facility].

## 2023-06-23 DIAGNOSIS — E78.5 HYPERLIPIDEMIA, UNSPECIFIED HYPERLIPIDEMIA TYPE: ICD-10-CM

## 2023-06-23 DIAGNOSIS — E03.9 HYPOTHYROIDISM, UNSPECIFIED TYPE: ICD-10-CM

## 2023-06-23 DIAGNOSIS — I10 ESSENTIAL HYPERTENSION: ICD-10-CM

## 2023-06-23 LAB
ALBUMIN SERPL-MCNC: 4.9 G/DL (ref 3.5–5.2)
ALP SERPL-CCNC: 57 U/L (ref 40–129)
ALT SERPL-CCNC: 41 U/L (ref 0–40)
ANION GAP SERPL CALCULATED.3IONS-SCNC: 19 MMOL/L (ref 7–16)
AST SERPL-CCNC: 40 U/L (ref 0–39)
BILIRUB SERPL-MCNC: 0.6 MG/DL (ref 0–1.2)
BUN SERPL-MCNC: 11 MG/DL (ref 6–23)
CALCIUM SERPL-MCNC: 9.9 MG/DL (ref 8.6–10.2)
CHLORIDE SERPL-SCNC: 103 MMOL/L (ref 98–107)
CHOLESTEROL, FASTING: 160 MG/DL (ref 0–199)
CO2 SERPL-SCNC: 20 MMOL/L (ref 22–29)
CREAT SERPL-MCNC: 0.9 MG/DL (ref 0.7–1.2)
GLUCOSE FASTING: 89 MG/DL (ref 74–99)
HDLC SERPL-MCNC: 56 MG/DL
LDL CHOLESTEROL CALCULATED: 76 MG/DL (ref 0–99)
POTASSIUM SERPL-SCNC: 4.2 MMOL/L (ref 3.5–5)
PROT SERPL-MCNC: 7.1 G/DL (ref 6.4–8.3)
SODIUM SERPL-SCNC: 142 MMOL/L (ref 132–146)
TRIGLYCERIDE, FASTING: 138 MG/DL (ref 0–149)
TSH SERPL-MCNC: 2.69 UIU/ML (ref 0.27–4.2)
VLDLC SERPL CALC-MCNC: 28 MG/DL

## 2023-06-28 ENCOUNTER — OFFICE VISIT (OUTPATIENT)
Dept: FAMILY MEDICINE CLINIC | Age: 63
End: 2023-06-28
Payer: COMMERCIAL

## 2023-06-28 VITALS
BODY MASS INDEX: 28.23 KG/M2 | HEART RATE: 71 BPM | DIASTOLIC BLOOD PRESSURE: 64 MMHG | OXYGEN SATURATION: 97 % | SYSTOLIC BLOOD PRESSURE: 112 MMHG | TEMPERATURE: 97.7 F | HEIGHT: 70 IN | WEIGHT: 197.2 LBS

## 2023-06-28 DIAGNOSIS — I25.10 CORONARY ARTERY DISEASE INVOLVING NATIVE CORONARY ARTERY OF NATIVE HEART WITHOUT ANGINA PECTORIS: ICD-10-CM

## 2023-06-28 DIAGNOSIS — C61 PROSTATE CANCER (HCC): ICD-10-CM

## 2023-06-28 DIAGNOSIS — E78.5 HYPERLIPIDEMIA, UNSPECIFIED HYPERLIPIDEMIA TYPE: ICD-10-CM

## 2023-06-28 DIAGNOSIS — E03.9 HYPOTHYROIDISM, UNSPECIFIED TYPE: ICD-10-CM

## 2023-06-28 DIAGNOSIS — I10 ESSENTIAL HYPERTENSION: Primary | ICD-10-CM

## 2023-06-28 DIAGNOSIS — D69.6 THROMBOCYTOPENIA (HCC): ICD-10-CM

## 2023-06-28 PROCEDURE — 3074F SYST BP LT 130 MM HG: CPT | Performed by: INTERNAL MEDICINE

## 2023-06-28 PROCEDURE — 99214 OFFICE O/P EST MOD 30 MIN: CPT | Performed by: INTERNAL MEDICINE

## 2023-06-28 PROCEDURE — 3078F DIAST BP <80 MM HG: CPT | Performed by: INTERNAL MEDICINE

## 2023-06-28 RX ORDER — ATENOLOL 25 MG/1
25 TABLET ORAL EVERY MORNING
Qty: 90 TABLET | Refills: 1 | Status: SHIPPED | OUTPATIENT
Start: 2023-06-28 | End: 2023-12-25

## 2023-06-28 RX ORDER — OLMESARTAN MEDOXOMIL AND HYDROCHLOROTHIAZIDE 40/12.5 40; 12.5 MG/1; MG/1
1 TABLET ORAL EVERY EVENING
Qty: 90 TABLET | Refills: 1 | Status: SHIPPED | OUTPATIENT
Start: 2023-06-28 | End: 2023-12-25

## 2023-06-28 RX ORDER — AMLODIPINE BESYLATE 5 MG/1
5 TABLET ORAL EVERY MORNING
Qty: 90 TABLET | Refills: 1 | Status: SHIPPED | OUTPATIENT
Start: 2023-06-28 | End: 2023-12-25

## 2023-06-28 RX ORDER — ROSUVASTATIN CALCIUM 20 MG/1
20 TABLET, COATED ORAL NIGHTLY
Qty: 90 TABLET | Refills: 1 | Status: SHIPPED | OUTPATIENT
Start: 2023-06-28 | End: 2023-12-25

## 2023-06-28 ASSESSMENT — ENCOUNTER SYMPTOMS
SHORTNESS OF BREATH: 0
ABDOMINAL PAIN: 0
NAUSEA: 0
BLOOD IN STOOL: 0
EYE DISCHARGE: 0

## 2023-07-05 DIAGNOSIS — I10 ESSENTIAL HYPERTENSION: ICD-10-CM

## 2023-07-05 RX ORDER — AMLODIPINE BESYLATE 5 MG/1
5 TABLET ORAL EVERY MORNING
Qty: 90 TABLET | Refills: 1 | OUTPATIENT
Start: 2023-07-05 | End: 2024-01-01

## 2023-07-05 RX ORDER — ATENOLOL 25 MG/1
25 TABLET ORAL EVERY MORNING
Qty: 90 TABLET | Refills: 1 | OUTPATIENT
Start: 2023-07-05 | End: 2024-01-01

## 2023-07-05 RX ORDER — OLMESARTAN MEDOXOMIL AND HYDROCHLOROTHIAZIDE 40/12.5 40; 12.5 MG/1; MG/1
1 TABLET ORAL EVERY EVENING
Qty: 90 TABLET | Refills: 1 | OUTPATIENT
Start: 2023-07-05 | End: 2024-01-01

## 2023-09-06 LAB — PSA SERPL-MCNC: 5.93 NG/ML (ref 0–4)

## 2023-10-04 ENCOUNTER — OFFICE VISIT (OUTPATIENT)
Dept: FAMILY MEDICINE CLINIC | Age: 63
End: 2023-10-04
Payer: COMMERCIAL

## 2023-10-04 VITALS
OXYGEN SATURATION: 96 % | WEIGHT: 199 LBS | HEART RATE: 61 BPM | HEIGHT: 70 IN | BODY MASS INDEX: 28.49 KG/M2 | DIASTOLIC BLOOD PRESSURE: 80 MMHG | TEMPERATURE: 97.8 F | SYSTOLIC BLOOD PRESSURE: 134 MMHG

## 2023-10-04 DIAGNOSIS — I25.10 CORONARY ARTERY DISEASE INVOLVING NATIVE CORONARY ARTERY OF NATIVE HEART WITHOUT ANGINA PECTORIS: ICD-10-CM

## 2023-10-04 DIAGNOSIS — C61 PROSTATE CANCER (HCC): ICD-10-CM

## 2023-10-04 DIAGNOSIS — E78.5 HYPERLIPIDEMIA, UNSPECIFIED HYPERLIPIDEMIA TYPE: ICD-10-CM

## 2023-10-04 DIAGNOSIS — I10 ESSENTIAL HYPERTENSION: Primary | ICD-10-CM

## 2023-10-04 DIAGNOSIS — E03.9 HYPOTHYROIDISM, UNSPECIFIED TYPE: ICD-10-CM

## 2023-10-04 PROCEDURE — 3079F DIAST BP 80-89 MM HG: CPT | Performed by: INTERNAL MEDICINE

## 2023-10-04 PROCEDURE — 3075F SYST BP GE 130 - 139MM HG: CPT | Performed by: INTERNAL MEDICINE

## 2023-10-04 PROCEDURE — 99214 OFFICE O/P EST MOD 30 MIN: CPT | Performed by: INTERNAL MEDICINE

## 2023-10-04 ASSESSMENT — ENCOUNTER SYMPTOMS
SINUS PAIN: 0
EYE PAIN: 0
SHORTNESS OF BREATH: 0
EYE DISCHARGE: 0
NAUSEA: 0
SORE THROAT: 0
BLOOD IN STOOL: 0
ABDOMINAL PAIN: 0

## 2023-12-26 ENCOUNTER — PATIENT MESSAGE (OUTPATIENT)
Dept: FAMILY MEDICINE CLINIC | Age: 63
End: 2023-12-26

## 2023-12-26 DIAGNOSIS — I10 ESSENTIAL HYPERTENSION: ICD-10-CM

## 2023-12-26 RX ORDER — ATENOLOL 25 MG/1
25 TABLET ORAL EVERY MORNING
Qty: 90 TABLET | Refills: 0 | Status: SHIPPED | OUTPATIENT
Start: 2023-12-26 | End: 2024-06-23

## 2023-12-26 NOTE — TELEPHONE ENCOUNTER
From: Jake Beatty  To: Dr. Jesse Sahni: 12/26/2023 1:21 PM EST  Subject: Atenolol reminder    Regarding my request for refill, please send to [facility] [phone] [place].  Thank you

## 2023-12-28 DIAGNOSIS — I10 ESSENTIAL HYPERTENSION: ICD-10-CM

## 2023-12-28 RX ORDER — AMLODIPINE BESYLATE 5 MG/1
5 TABLET ORAL EVERY MORNING
Qty: 90 TABLET | Refills: 1 | OUTPATIENT
Start: 2023-12-28 | End: 2024-06-25

## 2023-12-28 RX ORDER — OLMESARTAN MEDOXOMIL AND HYDROCHLOROTHIAZIDE 40/12.5 40; 12.5 MG/1; MG/1
1 TABLET ORAL EVERY EVENING
Qty: 90 TABLET | Refills: 1 | OUTPATIENT
Start: 2023-12-28 | End: 2024-06-25

## 2023-12-28 RX ORDER — ATENOLOL 25 MG/1
25 TABLET ORAL EVERY MORNING
Qty: 90 TABLET | Refills: 0 | OUTPATIENT
Start: 2023-12-28 | End: 2024-06-25

## 2024-01-09 DIAGNOSIS — E03.9 HYPOTHYROIDISM, UNSPECIFIED TYPE: ICD-10-CM

## 2024-01-09 DIAGNOSIS — E78.5 HYPERLIPIDEMIA, UNSPECIFIED HYPERLIPIDEMIA TYPE: ICD-10-CM

## 2024-01-09 DIAGNOSIS — I10 ESSENTIAL HYPERTENSION: ICD-10-CM

## 2024-01-09 LAB
ABSOLUTE IMMATURE GRANULOCYTE: <0.03 K/UL (ref 0–0.58)
ALBUMIN SERPL-MCNC: 4.9 G/DL (ref 3.5–5.2)
ALP BLD-CCNC: 68 U/L (ref 40–129)
ALT SERPL-CCNC: 38 U/L (ref 0–40)
ANION GAP SERPL CALCULATED.3IONS-SCNC: 16 MMOL/L (ref 7–16)
AST SERPL-CCNC: 31 U/L (ref 0–39)
BASOPHILS ABSOLUTE: 0.03 K/UL (ref 0–0.2)
BASOPHILS RELATIVE PERCENT: 1 % (ref 0–2)
BILIRUB SERPL-MCNC: 0.8 MG/DL (ref 0–1.2)
BUN BLDV-MCNC: 9 MG/DL (ref 6–23)
CALCIUM SERPL-MCNC: 9.8 MG/DL (ref 8.6–10.2)
CHLORIDE BLD-SCNC: 100 MMOL/L (ref 98–107)
CHOLESTEROL, FASTING: 196 MG/DL
CO2: 25 MMOL/L (ref 22–29)
CREAT SERPL-MCNC: 0.9 MG/DL (ref 0.7–1.2)
EOSINOPHILS ABSOLUTE: 0.17 K/UL (ref 0.05–0.5)
EOSINOPHILS RELATIVE PERCENT: 3 % (ref 0–6)
GFR SERPL CREATININE-BSD FRML MDRD: >60 ML/MIN/1.73M2
GLUCOSE FASTING: 84 MG/DL (ref 74–99)
HCT VFR BLD CALC: 48.5 % (ref 37–54)
HDLC SERPL-MCNC: 49 MG/DL
HEMOGLOBIN: 16.5 G/DL (ref 12.5–16.5)
IMMATURE GRANULOCYTES: 0 % (ref 0–5)
LDL CHOLESTEROL: 107 MG/DL
LYMPHOCYTES ABSOLUTE: 2.03 K/UL (ref 1.5–4)
LYMPHOCYTES RELATIVE PERCENT: 31 % (ref 20–42)
MCH RBC QN AUTO: 32 PG (ref 26–35)
MCHC RBC AUTO-ENTMCNC: 34 G/DL (ref 32–34.5)
MCV RBC AUTO: 94.2 FL (ref 80–99.9)
MONOCYTES ABSOLUTE: 0.7 K/UL (ref 0.1–0.95)
MONOCYTES RELATIVE PERCENT: 11 % (ref 2–12)
NEUTROPHILS ABSOLUTE: 3.56 K/UL (ref 1.8–7.3)
NEUTROPHILS RELATIVE PERCENT: 55 % (ref 43–80)
PDW BLD-RTO: 13.2 % (ref 11.5–15)
PLATELET # BLD: 125 K/UL (ref 130–450)
PMV BLD AUTO: 11.3 FL (ref 7–12)
POTASSIUM SERPL-SCNC: 4.1 MMOL/L (ref 3.5–5)
RBC # BLD: 5.15 M/UL (ref 3.8–5.8)
SODIUM BLD-SCNC: 141 MMOL/L (ref 132–146)
TOTAL PROTEIN: 7.4 G/DL (ref 6.4–8.3)
TRIGLYCERIDE, FASTING: 202 MG/DL
TSH SERPL DL<=0.05 MIU/L-ACNC: 5.56 UIU/ML (ref 0.27–4.2)
VLDLC SERPL CALC-MCNC: 40 MG/DL
WBC # BLD: 6.5 K/UL (ref 4.5–11.5)

## 2024-01-18 ENCOUNTER — OFFICE VISIT (OUTPATIENT)
Dept: FAMILY MEDICINE CLINIC | Age: 64
End: 2024-01-18
Payer: COMMERCIAL

## 2024-01-18 VITALS
TEMPERATURE: 98.2 F | BODY MASS INDEX: 28.3 KG/M2 | WEIGHT: 197.7 LBS | HEIGHT: 70 IN | DIASTOLIC BLOOD PRESSURE: 74 MMHG | OXYGEN SATURATION: 98 % | HEART RATE: 62 BPM | SYSTOLIC BLOOD PRESSURE: 128 MMHG

## 2024-01-18 DIAGNOSIS — D69.6 THROMBOCYTOPENIA (HCC): ICD-10-CM

## 2024-01-18 DIAGNOSIS — C61 PROSTATE CANCER (HCC): ICD-10-CM

## 2024-01-18 DIAGNOSIS — E78.5 HYPERLIPIDEMIA, UNSPECIFIED HYPERLIPIDEMIA TYPE: ICD-10-CM

## 2024-01-18 DIAGNOSIS — E03.9 HYPOTHYROIDISM, UNSPECIFIED TYPE: ICD-10-CM

## 2024-01-18 DIAGNOSIS — I10 ESSENTIAL HYPERTENSION: Primary | ICD-10-CM

## 2024-01-18 PROBLEM — I70.1 RENAL ARTERY ATHEROSCLEROSIS (HCC): Status: RESOLVED | Noted: 2023-03-29 | Resolved: 2024-01-18

## 2024-01-18 PROCEDURE — 3078F DIAST BP <80 MM HG: CPT | Performed by: INTERNAL MEDICINE

## 2024-01-18 PROCEDURE — 3074F SYST BP LT 130 MM HG: CPT | Performed by: INTERNAL MEDICINE

## 2024-01-18 PROCEDURE — 99214 OFFICE O/P EST MOD 30 MIN: CPT | Performed by: INTERNAL MEDICINE

## 2024-01-18 RX ORDER — ROSUVASTATIN CALCIUM 20 MG/1
20 TABLET, COATED ORAL NIGHTLY
Qty: 90 TABLET | Refills: 1 | Status: SHIPPED | OUTPATIENT
Start: 2024-01-18 | End: 2024-07-16

## 2024-01-18 RX ORDER — OLMESARTAN MEDOXOMIL AND HYDROCHLOROTHIAZIDE 40/12.5 40; 12.5 MG/1; MG/1
1 TABLET ORAL EVERY EVENING
Qty: 90 TABLET | Refills: 0 | Status: SHIPPED | OUTPATIENT
Start: 2024-01-18 | End: 2024-07-16

## 2024-01-18 RX ORDER — AMLODIPINE BESYLATE 5 MG/1
5 TABLET ORAL EVERY MORNING
Qty: 90 TABLET | Refills: 1 | Status: SHIPPED | OUTPATIENT
Start: 2024-01-18 | End: 2024-07-16

## 2024-01-18 RX ORDER — ATENOLOL 25 MG/1
25 TABLET ORAL EVERY MORNING
Qty: 90 TABLET | Refills: 1 | Status: SHIPPED | OUTPATIENT
Start: 2024-01-18 | End: 2024-07-16

## 2024-01-18 ASSESSMENT — ENCOUNTER SYMPTOMS
EYE PAIN: 0
NAUSEA: 0
ABDOMINAL PAIN: 0
BLOOD IN STOOL: 0
SORE THROAT: 0
EYE DISCHARGE: 0
SINUS PAIN: 0
SHORTNESS OF BREATH: 0

## 2024-01-18 ASSESSMENT — PATIENT HEALTH QUESTIONNAIRE - PHQ9
1. LITTLE INTEREST OR PLEASURE IN DOING THINGS: 0
SUM OF ALL RESPONSES TO PHQ QUESTIONS 1-9: 0
SUM OF ALL RESPONSES TO PHQ QUESTIONS 1-9: 0
2. FEELING DOWN, DEPRESSED OR HOPELESS: 0
SUM OF ALL RESPONSES TO PHQ QUESTIONS 1-9: 0
SUM OF ALL RESPONSES TO PHQ QUESTIONS 1-9: 0
SUM OF ALL RESPONSES TO PHQ9 QUESTIONS 1 & 2: 0

## 2024-01-18 NOTE — PROGRESS NOTES
our record of what you are currently taking, including any changes that were made at today's visit.  If you find any differences when compared to your medications at home, or have any questions that were not answered at your visit, please contact the office.        Return in about 3 months (around 4/18/2024).   F/u 3 months

## 2024-02-05 ENCOUNTER — OFFICE VISIT (OUTPATIENT)
Dept: ONCOLOGY | Age: 64
End: 2024-02-05

## 2024-02-05 VITALS
SYSTOLIC BLOOD PRESSURE: 166 MMHG | OXYGEN SATURATION: 97 % | BODY MASS INDEX: 28.72 KG/M2 | DIASTOLIC BLOOD PRESSURE: 83 MMHG | HEIGHT: 70 IN | TEMPERATURE: 97.2 F | HEART RATE: 55 BPM | WEIGHT: 200.6 LBS

## 2024-02-05 DIAGNOSIS — D69.6 THROMBOCYTOPENIA (HCC): Primary | ICD-10-CM

## 2024-02-05 NOTE — PROGRESS NOTES
Shaheed Clayton  1960 64 y.o.      Referring Physician:     PCP: Aster West MD    Vitals:    24 1018   BP: (!) 166/83   Pulse: 55   Temp: 97.2 °F (36.2 °C)   SpO2: 97%        Wt Readings from Last 3 Encounters:   24 91 kg (200 lb 9.6 oz)   24 89.7 kg (197 lb 11.2 oz)   10/04/23 90.3 kg (199 lb)        Body mass index is 28.78 kg/m².          Chief Complaint:   Chief Complaint   Patient presents with    New Patient     Thrombocytopenia          Cancer Staging   No matching staging information was found for the patient.    Prior Radiation Therapy? NO    Concurrent Chemo/radiation? NO    Prior Chemotherapy? NO    Prior Hormonal Therapy? NO    Head and Neck Cancer? No, patient does NOT have HN cancer.      LMP: na    Age at first Menses: na    : na    Para: na          Current Outpatient Medications:     olmesartan-hydroCHLOROthiazide (BENICAR HCT) 40-12.5 MG per tablet, Take 1 tablet by mouth every evening, Disp: 90 tablet, Rfl: 0    rosuvastatin (CRESTOR) 20 MG tablet, Take 1 tablet by mouth at bedtime Pt taking Monday thru Friday, Disp: 90 tablet, Rfl: 1    amLODIPine (NORVASC) 5 MG tablet, Take 1 tablet by mouth every morning, Disp: 90 tablet, Rfl: 1    atenolol (TENORMIN) 25 MG tablet, Take 1 tablet by mouth every morning, Disp: 90 tablet, Rfl: 1    Black Pepper-Turmeric (TURMERIC PLUS BLACK PEPPER EXT) 5-500 MG CAPS, , Disp: , Rfl:     Cholecalciferol (VITAMIN D) 50 MCG (2000) CAPS capsule, Take by mouth, Disp: , Rfl:     Zinc 15 MG CAPS, Take 30 mg by mouth daily, Disp: , Rfl:     Coenzyme Q10 (CO Q 10) 100 MG CAPS, Take by mouth daily , Disp: , Rfl:     aspirin 81 MG tablet, Take 1 tablet by mouth daily, Disp: , Rfl:     Multiple Vitamins-Minerals (MULTIVITAL-M PO), Take  by mouth. Shaklee vits, Disp: , Rfl:     sildenafil (REVATIO) 20 MG tablet, TAKE 1 TO 5 TABLETS BY MOUTH AS NEEDED, Disp: , Rfl:     Resveratrol 100 MG CAPS, Take 100 mg by mouth daily (Patient not 
(BENICAR HCT) 40-12.5 MG per tablet Take 1 tablet by mouth every evening 1/18/24 7/16/24 Yes Aster West MD   rosuvastatin (CRESTOR) 20 MG tablet Take 1 tablet by mouth at bedtime Pt taking Monday thru Friday 1/18/24 7/16/24 Yes Aster West MD   amLODIPine (NORVASC) 5 MG tablet Take 1 tablet by mouth every morning 1/18/24 7/16/24 Yes Aster West MD   atenolol (TENORMIN) 25 MG tablet Take 1 tablet by mouth every morning 1/18/24 7/16/24 Yes Aster West MD   Black Pepper-Turmeric (TURMERIC PLUS BLACK PEPPER EXT) 5-500 MG CAPS  1/1/22  Yes Derrick Stack MD   Cholecalciferol (VITAMIN D) 50 MCG (2000 UT) CAPS capsule Take by mouth   Yes Derrick Stack MD   Zinc 15 MG CAPS Take 30 mg by mouth daily   Yes Derrick Stack MD   Coenzyme Q10 (CO Q 10) 100 MG CAPS Take by mouth daily    Yes Derrick Stack MD   aspirin 81 MG tablet Take 1 tablet by mouth daily   Yes Derrick Stack MD   Multiple Vitamins-Minerals (MULTIVITAL-M PO) Take  by mouth. Shaklee vits   Yes Derrick Stack MD   sildenafil (REVATIO) 20 MG tablet TAKE 1 TO 5 TABLETS BY MOUTH AS NEEDED 5/2/22   Derrick Stack MD   Resveratrol 100 MG CAPS Take 100 mg by mouth daily  Patient not taking: Reported on 2/5/2024 1/1/15   Derrick Stack MD       Allergies  No Known Allergies    Review of Systems:    Constitutional:  No fever chills or rigors.   Eyes: No changes in vision, discharge, or pain  ENT: No Headaches, hearing loss or vertigo. No mouth sores or sore throat.No change in taste or smell.   Cardiovascular: No chest discomfort, dyspnea on exertion, palpitations or loss of consciousness. or phlebitis.   Respiratory: Has no cough or wheezing, Has no sputum production. Has no hemoptysis, Has no pleuritic pain, .   Gastrointestinal: No abdominal pain, appetite loss, blood in stools. No change in bowel habits.No hematemesis   Genitourinary: Patient acknowledges no dysuria, trouble

## 2024-03-02 ENCOUNTER — HOSPITAL ENCOUNTER (EMERGENCY)
Age: 64
Discharge: HOME | End: 2024-03-02
Payer: MEDICARE

## 2024-03-02 VITALS
BODY MASS INDEX: 27.98 KG/M2 | HEART RATE: 88 BPM | TEMPERATURE: 98.4 F | DIASTOLIC BLOOD PRESSURE: 93 MMHG | OXYGEN SATURATION: 95 % | SYSTOLIC BLOOD PRESSURE: 158 MMHG | WEIGHT: 195 LBS | RESPIRATION RATE: 18 BRPM

## 2024-03-02 DIAGNOSIS — J10.1 INFLUENZA A: Primary | ICD-10-CM

## 2024-03-02 LAB
INFLUENZA A BY PCR: DETECTED
INFLUENZA B BY PCR: NOT DETECTED
SARS-COV-2 RDRP RESP QL NAA+PROBE: NOT DETECTED
SPECIMEN DESCRIPTION: NORMAL

## 2024-03-02 PROCEDURE — 87502 INFLUENZA DNA AMP PROBE: CPT

## 2024-03-02 PROCEDURE — 87635 SARS-COV-2 COVID-19 AMP PRB: CPT

## 2024-03-02 PROCEDURE — 99211 OFF/OP EST MAY X REQ PHY/QHP: CPT

## 2024-03-02 RX ORDER — GUAIFENESIN/DEXTROMETHORPHAN 100-10MG/5
10 SYRUP ORAL 3 TIMES DAILY PRN
Qty: 120 ML | Refills: 0 | Status: SHIPPED | OUTPATIENT
Start: 2024-03-02 | End: 2024-03-12

## 2024-03-02 RX ORDER — ACETAMINOPHEN 500 MG
1000 TABLET ORAL EVERY 6 HOURS PRN
Qty: 30 TABLET | Refills: 0 | Status: SHIPPED | OUTPATIENT
Start: 2024-03-02

## 2024-03-02 RX ORDER — OSELTAMIVIR PHOSPHATE 75 MG/1
75 CAPSULE ORAL 2 TIMES DAILY
Qty: 10 CAPSULE | Refills: 0 | Status: SHIPPED | OUTPATIENT
Start: 2024-03-02 | End: 2024-03-07

## 2024-03-02 RX ORDER — IBUPROFEN 600 MG/1
600 TABLET ORAL EVERY 8 HOURS PRN
Qty: 30 TABLET | Refills: 0 | Status: SHIPPED | OUTPATIENT
Start: 2024-03-02

## 2024-03-02 NOTE — ED PROVIDER NOTES
Department of Emergency Medicine   ED  Encounter Note  Admit Date/RoomTime: 3/2/2024  2:16 PM  ED Room:     NAME: Shaheed Clayton  : 1960  MRN: [id number]     Chief Complaint:  Fever (Fever, body aches, headache, and coughing)    History of Present Illness       Shaheed Clayton is a 64 y.o. old male who presents to urgent care by private vehicle, for fever, chills, nasal congestion, and cough, which began several hour(s) prior to arrival.  Since onset the symptoms have been stable and mild-moderate in severity. The symptoms are associated with muscle aches.  There has been no chest pain, shortness of breath, vomiting or diarrhea.    ROS   Pertinent positives and negatives are stated within HPI, all other systems reviewed and are negative.    Past Medical History:  has a past medical history of Alcohol abuse, BPH with elevated PSA, CAD (coronary artery disease), Cancer (HCC), Chest pain, DJD (degenerative joint disease), Gastritis, GERD (gastroesophageal reflux disease), H/O exercise stress test, H/O exercise stress test, Hx of cardiovascular stress test, Hyperlipidemia, Hypertension, Hypothyroidism, PONV (postoperative nausea and vomiting), Thrombocytopenia (HCC), and Thyroid disease.    Surgical History:  has a past surgical history that includes Cardiac surgery (2011); Rotator cuff repair (Right, 2010); Diagnostic Cardiac Cath Lab Procedure (11); Knee arthroscopy (Right, ); Hand surgery (Left, ); Colonoscopy (11); Septoplasty (2015); Knee arthroscopy (Right, 2019); Knee arthroscopy (Right, 2019); Knee Arthroplasty; and Prostate biopsy (2021).    Social History:  reports that he has never smoked. He has never used smokeless tobacco. He reports current alcohol use. He reports that he does not use drugs.    Family History: family history includes Coronary Art Dis in his mother; Heart Surgery in his mother; Heart Surgery (age of onset: 74) in his father; High

## 2024-03-14 ENCOUNTER — OFFICE VISIT (OUTPATIENT)
Dept: CARDIOLOGY CLINIC | Age: 64
End: 2024-03-14

## 2024-03-14 VITALS
DIASTOLIC BLOOD PRESSURE: 68 MMHG | RESPIRATION RATE: 18 BRPM | BODY MASS INDEX: 28.49 KG/M2 | WEIGHT: 199 LBS | HEIGHT: 70 IN | HEART RATE: 72 BPM | SYSTOLIC BLOOD PRESSURE: 104 MMHG

## 2024-03-14 DIAGNOSIS — E78.2 MIXED HYPERLIPIDEMIA: ICD-10-CM

## 2024-03-14 DIAGNOSIS — M15.9 PRIMARY OSTEOARTHRITIS INVOLVING MULTIPLE JOINTS: ICD-10-CM

## 2024-03-14 DIAGNOSIS — I10 ESSENTIAL HYPERTENSION: ICD-10-CM

## 2024-03-14 DIAGNOSIS — I25.10 CAD IN NATIVE ARTERY: Primary | ICD-10-CM

## 2024-03-14 DIAGNOSIS — R79.89 ELEVATED TSH: ICD-10-CM

## 2024-03-14 DIAGNOSIS — F10.10 ALCOHOL ABUSE: ICD-10-CM

## 2024-03-14 DIAGNOSIS — Z98.890 HX OF ARTHROSCOPY OF RIGHT KNEE: ICD-10-CM

## 2024-03-14 DIAGNOSIS — Z86.39 H/O: HYPOTHYROIDISM: ICD-10-CM

## 2024-03-14 DIAGNOSIS — Z98.61 HISTORY OF PTCA: ICD-10-CM

## 2024-03-14 DIAGNOSIS — Z79.899 ON STATIN THERAPY: ICD-10-CM

## 2024-03-15 NOTE — PROGRESS NOTES
OFFICE VISIT        PRIMARY CARE PHYSICIAN:    Aster West MD       ALLERGIES / SENSITIVITIES:    No Known Allergies       REVIEWED MEDICATIONS:      Current Outpatient Medications:     olmesartan-hydroCHLOROthiazide (BENICAR HCT) 40-12.5 MG per tablet, Take 1 tablet by mouth every evening, Disp: 90 tablet, Rfl: 0    rosuvastatin (CRESTOR) 20 MG tablet, Take 1 tablet by mouth at bedtime Pt taking Monday thru Friday (Patient taking differently: Take 1 tablet by mouth at bedtime), Disp: 90 tablet, Rfl: 1    amLODIPine (NORVASC) 5 MG tablet, Take 1 tablet by mouth every morning, Disp: 90 tablet, Rfl: 1    atenolol (TENORMIN) 25 MG tablet, Take 1 tablet by mouth every morning, Disp: 90 tablet, Rfl: 1    Black Pepper-Turmeric (TURMERIC PLUS BLACK PEPPER EXT) 5-500 MG CAPS, , Disp: , Rfl:     Cholecalciferol (VITAMIN D) 50 MCG (2000 UT) CAPS capsule, Take by mouth, Disp: , Rfl:     sildenafil (REVATIO) 20 MG tablet, TAKE 1 TO 5 TABLETS BY MOUTH AS NEEDED, Disp: , Rfl:     Zinc 15 MG CAPS, Take 30 mg by mouth daily, Disp: , Rfl:     Coenzyme Q10 (CO Q 10) 100 MG CAPS, Take by mouth daily , Disp: , Rfl:     aspirin 81 MG tablet, Take 1 tablet by mouth daily, Disp: , Rfl:     Multiple Vitamins-Minerals (MULTIVITAL-M PO), Take  by mouth. Woody smith, Disp: , Rfl:       S: REASON FOR VISIT:    Coronary artery disease.  Shaheed is a pleasant, 64-year-old gentleman with cardiovascular history as described below.  He was last seen by me in the office in 2/2023.  His last stress test was in 3/2022.  He has been stable from a cardiac standpoint.  He denies chest pain or dyspnea with his daily activities.  He denies orthopnea, PND's or lower extremity swelling.  He has occasional dizziness if he bends over and straightens himself up quickly.  He reports that his blood pressure remains labile and that he adjusts his amlodipine dose accordingly.  He was in the Emergency Room on 3/2/2024 for fever, cough, body aches and

## 2024-03-19 NOTE — H&P
[place] [id number]                           HISTORY & PHYSICAL      PATIENT NAME: CASSANDRA KHOURY              : 1960  MED REC NO: [id number]                        ROOM:   ACCOUNT NO: [id number]                       ADMIT DATE: 2024  PROVIDER: Suzy Navas MD      CHIEF COMPLAINT:  Screening.    HISTORY OF PRESENT ILLNESS:  The patient is a 58-year-old for screening colonoscopy.    HABITS:  Denies.    ALLERGIES:  UNKNOWN.      CURRENT MEDICATIONS:  Amlodipine, aspirin, atenolol, multivitamins, olmesartan, rosuvastatin, sildenafil, and vitamin D.    MEDICAL HISTORY:  Coronary artery disease, colonic polyp, and cancer of the prostate.    PAST SURGICAL HISTORY:  Cardiac cath and stent, right shoulder, scope knee.    REVIEW OF SYSTEMS:  Noncontributory for this particular problem.    FAMILY HISTORY:  Noncontributory for this particular problem.    PHYSICAL EXAMINATION:  GENERAL:  Alert and oriented.  VITAL SIGNS:  /80, pulse 72, respirations 16, and temperature 97.1.  HEENT:  Oral cavity negative.  NECK:  Negative.  The peripheral lymph nodes are not palpable.  LUNGS:  Clear to auscultation.  HEART:  Regular.  ABDOMEN:  Soft.  No palpable masses.  RECTAL:  Negative.  GENITALIA:  Deferred.  EXTREMITIES:  Negative.  NEUROLOGIC:  Oriented to time and place.  No gross deficit.    IMPRESSION:  Screening colonoscopy.    PLAN:  Colonoscopy.          SUZY NAVAS MD      D:  2024 07:39:33     T:  2024 09:33:00     CAR/AQS  Job #:  [id number]     Doc#:  [id number]

## 2024-03-20 NOTE — PROGRESS NOTES
[facility]                                                                                                                    PRE OP INSTRUCTIONS FOR  Shaheed Clayton        Date: 3/20/2024    Date of surgery: 03/21/2024   Arrival Time: Hospital will call you between 5pm and 7pm with your final arrival time for surgery    Do not eat or drink anything after  12 am prior to surgery. This includes no water, chewing gum, mints or ice chips.    Take the following medications with a small sip of water on the morning of Surgery: atenolol    Diabetics may take evening dose of insulin but none after midnight.  If you feel symptomatic or low blood sugar morning of surgery drink 1-2 ounces of apple juice only.    Aspirin, Ibuprofen, Advil, Naproxen, Vitamin E and other Anti-inflammatory products should be stopped  before surgery  as directed by your physician.  Take Tylenol only unless instructed otherwise by your surgeon.    Check with your Doctor regarding stopping Plavix, Coumadin, Lovenox, Eliquis, Effient, or other blood thinners.    Do not smoke,use illicit drugs and do not drink any alcoholic beverages 24 hours prior to surgery.    You may brush your teeth the morning of surgery.  DO NOT SWALLOW WATER    You MUST make arrangements for a responsible adult to take you home after your surgery. You will not be allowed to leave alone or drive yourself home.  It is strongly suggested someone stay with you the first 24 hrs. Your surgery will be cancelled if you do not have a ride home.    PEDIATRIC PATIENTS ONLY:  A parent/legal guardian must accompany a child scheduled for surgery and plan to stay at the hospital until the child is discharged.  Please do not bring other children with you.    Please wear simple, loose fitting clothing to the hospital.  Do not bring valuables (money, credit cards, checkbooks, etc.) Do not wear any makeup (including no eye makeup) or nail polish on your fingers or

## 2024-03-21 ENCOUNTER — HOSPITAL ENCOUNTER (OUTPATIENT)
Age: 64
Setting detail: OUTPATIENT SURGERY
Discharge: HOME | End: 2024-03-21
Attending: INTERNAL MEDICINE | Admitting: INTERNAL MEDICINE
Payer: MEDICARE

## 2024-03-21 ENCOUNTER — ANESTHESIA EVENT (OUTPATIENT)
Dept: ENDOSCOPY | Age: 64
End: 2024-03-21
Payer: MEDICARE

## 2024-03-21 ENCOUNTER — ANESTHESIA (OUTPATIENT)
Dept: ENDOSCOPY | Age: 64
End: 2024-03-21
Payer: MEDICARE

## 2024-03-21 VITALS
HEIGHT: 70 IN | WEIGHT: 195 LBS | RESPIRATION RATE: 16 BRPM | TEMPERATURE: 98.1 F | HEART RATE: 54 BPM | DIASTOLIC BLOOD PRESSURE: 79 MMHG | SYSTOLIC BLOOD PRESSURE: 128 MMHG | OXYGEN SATURATION: 95 % | BODY MASS INDEX: 27.92 KG/M2

## 2024-03-21 PROCEDURE — 3700000001 HC ADD 15 MINUTES (ANESTHESIA): Performed by: INTERNAL MEDICINE

## 2024-03-21 PROCEDURE — 2709999900 HC NON-CHARGEABLE SUPPLY: Performed by: INTERNAL MEDICINE

## 2024-03-21 PROCEDURE — 3700000000 HC ANESTHESIA ATTENDED CARE: Performed by: INTERNAL MEDICINE

## 2024-03-21 PROCEDURE — 7100000010 HC PHASE II RECOVERY - FIRST 15 MIN: Performed by: INTERNAL MEDICINE

## 2024-03-21 PROCEDURE — 6360000002 HC RX W HCPCS: Performed by: NURSE ANESTHETIST, CERTIFIED REGISTERED

## 2024-03-21 PROCEDURE — 7100000011 HC PHASE II RECOVERY - ADDTL 15 MIN: Performed by: INTERNAL MEDICINE

## 2024-03-21 PROCEDURE — 2580000003 HC RX 258: Performed by: NURSE ANESTHETIST, CERTIFIED REGISTERED

## 2024-03-21 PROCEDURE — 3609027000 HC COLONOSCOPY: Performed by: INTERNAL MEDICINE

## 2024-03-21 RX ORDER — SODIUM CHLORIDE, SODIUM LACTATE, POTASSIUM CHLORIDE, CALCIUM CHLORIDE 600; 310; 30; 20 MG/100ML; MG/100ML; MG/100ML; MG/100ML
INJECTION, SOLUTION INTRAVENOUS CONTINUOUS PRN
Status: DISCONTINUED | OUTPATIENT
Start: 2024-03-21 | End: 2024-03-21 | Stop reason: SDUPTHER

## 2024-03-21 RX ORDER — MIDAZOLAM HYDROCHLORIDE 1 MG/ML
INJECTION INTRAMUSCULAR; INTRAVENOUS PRN
Status: DISCONTINUED | OUTPATIENT
Start: 2024-03-21 | End: 2024-03-21 | Stop reason: SDUPTHER

## 2024-03-21 RX ORDER — PROPOFOL 10 MG/ML
INJECTION, EMULSION INTRAVENOUS PRN
Status: DISCONTINUED | OUTPATIENT
Start: 2024-03-21 | End: 2024-03-21 | Stop reason: SDUPTHER

## 2024-03-21 RX ORDER — FENTANYL CITRATE 50 UG/ML
INJECTION, SOLUTION INTRAMUSCULAR; INTRAVENOUS PRN
Status: DISCONTINUED | OUTPATIENT
Start: 2024-03-21 | End: 2024-03-21 | Stop reason: SDUPTHER

## 2024-03-21 RX ORDER — SODIUM CHLORIDE, SODIUM LACTATE, POTASSIUM CHLORIDE, CALCIUM CHLORIDE 600; 310; 30; 20 MG/100ML; MG/100ML; MG/100ML; MG/100ML
INJECTION, SOLUTION INTRAVENOUS CONTINUOUS
Status: DISCONTINUED | OUTPATIENT
Start: 2024-03-21 | End: 2024-03-21 | Stop reason: HOSPADM

## 2024-03-21 RX ADMIN — PROPOFOL 50 MG: 10 INJECTION, EMULSION INTRAVENOUS at 07:33

## 2024-03-21 RX ADMIN — PROPOFOL 30 MG: 10 INJECTION, EMULSION INTRAVENOUS at 07:41

## 2024-03-21 RX ADMIN — FENTANYL CITRATE 50 MCG: 50 INJECTION, SOLUTION INTRAMUSCULAR; INTRAVENOUS at 07:32

## 2024-03-21 RX ADMIN — PROPOFOL 30 MG: 10 INJECTION, EMULSION INTRAVENOUS at 07:37

## 2024-03-21 RX ADMIN — MIDAZOLAM 1 MG: 1 INJECTION INTRAMUSCULAR; INTRAVENOUS at 07:32

## 2024-03-21 RX ADMIN — SODIUM CHLORIDE, POTASSIUM CHLORIDE, SODIUM LACTATE AND CALCIUM CHLORIDE: 600; 310; 30; 20 INJECTION, SOLUTION INTRAVENOUS at 07:26

## 2024-03-21 RX ADMIN — PROPOFOL 100 MG: 10 INJECTION, EMULSION INTRAVENOUS at 07:32

## 2024-03-21 RX ADMIN — PROPOFOL 30 MG: 10 INJECTION, EMULSION INTRAVENOUS at 07:35

## 2024-03-21 RX ADMIN — PROPOFOL 30 MG: 10 INJECTION, EMULSION INTRAVENOUS at 07:39

## 2024-03-21 RX ADMIN — PROPOFOL 40 MG: 10 INJECTION, EMULSION INTRAVENOUS at 07:34

## 2024-03-21 ASSESSMENT — PAIN - FUNCTIONAL ASSESSMENT
PAIN_FUNCTIONAL_ASSESSMENT: NONE - DENIES PAIN

## 2024-03-21 ASSESSMENT — LIFESTYLE VARIABLES: SMOKING_STATUS: 0

## 2024-03-21 NOTE — ANESTHESIA POSTPROCEDURE EVALUATION
Department of Anesthesiology  Postprocedure Note    Patient: Shaheed Clayton  MRN: [id number]  YOB: 1960  Date of evaluation: 3/21/2024    Procedure Summary       Date: 03/21/24 Room / Location: [place] [address] / [facility]    Anesthesia Start: 0726 Anesthesia Stop: 0749    Procedure: COLONOSCOPY DIAGNOSTIC Diagnosis:       Special screening for malignant neoplasms, colon      (Special screening for malignant neoplasms, colon [Z12.11])    Surgeons: Abilio Charles MD Responsible Provider: Shaquille Montoya MD    Anesthesia Type: MAC ASA Status: 3            Anesthesia Type: No value filed.    Stefano Phase I: Stefano Score: 10    Stefano Phase II: Stefano Score: 10    Anesthesia Post Evaluation    No notable events documented.

## 2024-03-21 NOTE — ANESTHESIA PRE PROCEDURE
BP Readings from Last 3 Encounters:   03/21/24 (!) 163/74   03/14/24 104/68   03/02/24 (!) 158/93       NPO Status: Time of last liquid consumption: 2230                        Time of last solid consumption: 1700                        Date of last liquid consumption: 03/20/24                        Date of last solid food consumption: 03/19/24    BMI:   Wt Readings from Last 3 Encounters:   03/21/24 88.5 kg (195 lb)   03/14/24 90.3 kg (199 lb)   03/02/24 88.5 kg (195 lb)     Body mass index is 27.98 kg/m².    CBC:   Lab Results   Component Value Date/Time    WBC 6.5 01/09/2024 08:09 AM    RBC 5.15 01/09/2024 08:09 AM    HGB 16.5 01/09/2024 08:09 AM    HCT 48.5 01/09/2024 08:09 AM    MCV 94.2 01/09/2024 08:09 AM    RDW 13.2 01/09/2024 08:09 AM     01/09/2024 08:09 AM       CMP:   Lab Results   Component Value Date/Time     01/09/2024 08:09 AM    K 4.1 01/09/2024 08:09 AM    K 4.0 03/21/2022 05:06 PM     01/09/2024 08:09 AM    CO2 25 01/09/2024 08:09 AM    BUN 9 01/09/2024 08:09 AM    CREATININE 0.9 01/09/2024 08:09 AM    GFRAA >60 08/04/2022 01:34 PM    LABGLOM >60 01/09/2024 08:09 AM    GLUCOSE 83 03/13/2023 08:17 AM    GLUCOSE 82 08/04/2011 06:30 AM    PROT 7.4 01/09/2024 08:09 AM    CALCIUM 9.8 01/09/2024 08:09 AM    BILITOT 0.8 01/09/2024 08:09 AM    ALKPHOS 68 01/09/2024 08:09 AM    AST 31 01/09/2024 08:09 AM    ALT 38 01/09/2024 08:09 AM       POC Tests: No results for input(s): \"POCGLU\", \"POCNA\", \"POCK\", \"POCCL\", \"POCBUN\", \"POCHEMO\", \"POCHCT\" in the last 72 hours.    Coags:   Lab Results   Component Value Date/Time    PROTIME 11.6 06/17/2012 03:15 AM    PROTIME 14.7 01/27/2011 03:00 AM    INR 0.9 06/17/2012 03:15 AM    APTT 30.5 06/17/2012 03:15 AM       HCG (If Applicable): No results found for: \"PREGTESTUR\", \"PREGSERUM\", \"HCG\", \"HCGQUANT\"     ABGs: No results found for: \"PHART\", \"PO2ART\", \"DOF9VER\", \"JPW6HVQ\", \"BEART\", \"H7MDFPRG\"     Type & Screen (If Applicable):  No results found

## 2024-03-21 NOTE — OP NOTE
Operative Note      Patient: Shaheed Clayton  YOB: 1960  MRN: [id number]    Date of Procedure: 3/21/2024    Pre-Op Diagnosis Codes:     * Special screening for malignant neoplasms, colon [Z12.11]    Post-Op Diagnosis: Anatomic study       Procedure(s):  COLORECTAL CANCER SCREENING, NOT HIGH RISK    Surgeon(s):  Abilio Charles MD    Assistant:   Surgical Assistant: Gwen Sunshine RN    Anesthesia: Monitor Anesthesia Care    Estimated Blood Loss (mL): None    Complications: None    Specimens:   * No specimens in log *    Implants:  * No implants in log *      Drains: * No LDAs found *    Findings: Anatomic study        Detailed Description of Procedure:   64-year-old male patient, screening colonoscopy.    Digital rectal exam: Anatomic.  The Olympus videocolonoscope was introduced to the anus.  Advanced gently to the cecum.  The prep was good.  The mucosa was normal.  Internal hemorrhoids.    Recommend surveillance interval 10 years.    Electronically signed by Abilio Charles MD on 3/21/2024 at 7:30 AM

## 2024-03-21 NOTE — H&P
H&p reviewed. No changes.  Blood pressure (!) 163/74, pulse 58, temperature 97.6 °F (36.4 °C), temperature source Infrared, resp. rate 16, height 1.778 m (5' 10\"), weight 88.5 kg (195 lb), SpO2 97 %.

## 2024-03-21 NOTE — ANESTHESIA POSTPROCEDURE EVALUATION
Department of Anesthesiology  Postprocedure Note    Patient: Shaheed Clayton  MRN: [id number]  YOB: 1960  Date of evaluation: 3/21/2024    Procedure Summary       Date: 03/21/24 Room / Location: [place] [address] / [facility]    Anesthesia Start: 0726 Anesthesia Stop: 0749    Procedure: COLONOSCOPY DIAGNOSTIC Diagnosis:       Special screening for malignant neoplasms, colon      (Special screening for malignant neoplasms, colon [Z12.11])    Surgeons: Abilio Charles MD Responsible Provider: Shaquille Montoya MD    Anesthesia Type: MAC ASA Status: 3            Anesthesia Type: No value filed.    Stefano Phase I: Stefano Score: 10    Stefano Phase II:      Anesthesia Post Evaluation    Patient location during evaluation: bedside  Patient participation: complete - patient participated  Level of consciousness: awake and alert  Pain score: 2  Airway patency: patent  Nausea & Vomiting: no nausea  Cardiovascular status: blood pressure returned to baseline  Respiratory status: acceptable  Hydration status: euvolemic  Pain management: adequate    No notable events documented.

## 2024-04-07 ENCOUNTER — PATIENT MESSAGE (OUTPATIENT)
Dept: FAMILY MEDICINE CLINIC | Age: 64
End: 2024-04-07

## 2024-04-07 DIAGNOSIS — I10 ESSENTIAL HYPERTENSION: ICD-10-CM

## 2024-04-08 ENCOUNTER — PATIENT MESSAGE (OUTPATIENT)
Dept: FAMILY MEDICINE CLINIC | Age: 64
End: 2024-04-08

## 2024-04-08 DIAGNOSIS — I10 ESSENTIAL HYPERTENSION: ICD-10-CM

## 2024-04-08 RX ORDER — ATENOLOL 25 MG/1
25 TABLET ORAL EVERY MORNING
Qty: 90 TABLET | Refills: 0 | Status: SHIPPED | OUTPATIENT
Start: 2024-04-08 | End: 2024-10-05

## 2024-04-08 RX ORDER — OLMESARTAN MEDOXOMIL AND HYDROCHLOROTHIAZIDE 40/12.5 40; 12.5 MG/1; MG/1
1 TABLET ORAL EVERY EVENING
Qty: 90 TABLET | Refills: 0 | Status: SHIPPED | OUTPATIENT
Start: 2024-04-08 | End: 2024-10-05

## 2024-04-08 NOTE — TELEPHONE ENCOUNTER
From: Shaheed Clayton  To: Dr. Aster West  Sent: 4/7/2024 12:22 PM EDT  Subject: Atenolol 25     I am requesting a refill for Atenolol 25 mg as soon as possible as I have only 6 days left. A 30 day Rx is acceptable as I have an upcoming appt this month. Please send to [facility], [address] [facility] [address]. Thank you, Estevan

## 2024-04-08 NOTE — TELEPHONE ENCOUNTER
From: Shaheed Clayton  To: Dr. Aster West  Sent: 4/8/2024 8:10 AM EDT  Subject: Olmesartan 40/HCTZ 12.5     I am requesting 30 day refill of the above please as I will run out prior to my next appt. Please send to [facility] [address] [facility] [address]. Thank you, Estevan

## 2024-04-22 DIAGNOSIS — E03.9 HYPOTHYROIDISM, UNSPECIFIED TYPE: ICD-10-CM

## 2024-04-22 SDOH — ECONOMIC STABILITY: FOOD INSECURITY: WITHIN THE PAST 12 MONTHS, YOU WORRIED THAT YOUR FOOD WOULD RUN OUT BEFORE YOU GOT MONEY TO BUY MORE.: NEVER TRUE

## 2024-04-22 SDOH — ECONOMIC STABILITY: INCOME INSECURITY: HOW HARD IS IT FOR YOU TO PAY FOR THE VERY BASICS LIKE FOOD, HOUSING, MEDICAL CARE, AND HEATING?: NOT HARD AT ALL

## 2024-04-22 SDOH — ECONOMIC STABILITY: TRANSPORTATION INSECURITY
IN THE PAST 12 MONTHS, HAS LACK OF TRANSPORTATION KEPT YOU FROM MEETINGS, WORK, OR FROM GETTING THINGS NEEDED FOR DAILY LIVING?: NO

## 2024-04-22 SDOH — ECONOMIC STABILITY: FOOD INSECURITY: WITHIN THE PAST 12 MONTHS, THE FOOD YOU BOUGHT JUST DIDN'T LAST AND YOU DIDN'T HAVE MONEY TO GET MORE.: NEVER TRUE

## 2024-04-23 LAB
T4 FREE: 1.2 NG/DL (ref 0.9–1.7)
TSH SERPL DL<=0.05 MIU/L-ACNC: 5.45 UIU/ML (ref 0.27–4.2)

## 2024-04-25 ENCOUNTER — OFFICE VISIT (OUTPATIENT)
Dept: FAMILY MEDICINE CLINIC | Age: 64
End: 2024-04-25
Payer: MEDICARE

## 2024-04-25 VITALS
HEART RATE: 58 BPM | WEIGHT: 198.6 LBS | BODY MASS INDEX: 28.43 KG/M2 | TEMPERATURE: 97.8 F | DIASTOLIC BLOOD PRESSURE: 78 MMHG | SYSTOLIC BLOOD PRESSURE: 126 MMHG | HEIGHT: 70 IN | OXYGEN SATURATION: 97 %

## 2024-04-25 DIAGNOSIS — D69.6 THROMBOCYTOPENIA (HCC): ICD-10-CM

## 2024-04-25 DIAGNOSIS — E03.9 HYPOTHYROIDISM, UNSPECIFIED TYPE: ICD-10-CM

## 2024-04-25 DIAGNOSIS — I25.10 CORONARY ARTERY DISEASE INVOLVING NATIVE CORONARY ARTERY OF NATIVE HEART WITHOUT ANGINA PECTORIS: ICD-10-CM

## 2024-04-25 DIAGNOSIS — E78.5 HYPERLIPIDEMIA, UNSPECIFIED HYPERLIPIDEMIA TYPE: ICD-10-CM

## 2024-04-25 DIAGNOSIS — I10 ESSENTIAL HYPERTENSION: Primary | ICD-10-CM

## 2024-04-25 PROCEDURE — 1036F TOBACCO NON-USER: CPT | Performed by: INTERNAL MEDICINE

## 2024-04-25 PROCEDURE — 3017F COLORECTAL CA SCREEN DOC REV: CPT | Performed by: INTERNAL MEDICINE

## 2024-04-25 PROCEDURE — 99214 OFFICE O/P EST MOD 30 MIN: CPT | Performed by: INTERNAL MEDICINE

## 2024-04-25 PROCEDURE — G8419 CALC BMI OUT NRM PARAM NOF/U: HCPCS | Performed by: INTERNAL MEDICINE

## 2024-04-25 PROCEDURE — 3074F SYST BP LT 130 MM HG: CPT | Performed by: INTERNAL MEDICINE

## 2024-04-25 PROCEDURE — G8427 DOCREV CUR MEDS BY ELIG CLIN: HCPCS | Performed by: INTERNAL MEDICINE

## 2024-04-25 PROCEDURE — 3078F DIAST BP <80 MM HG: CPT | Performed by: INTERNAL MEDICINE

## 2024-04-25 PROCEDURE — 1123F ACP DISCUSS/DSCN MKR DOCD: CPT | Performed by: INTERNAL MEDICINE

## 2024-04-25 RX ORDER — OLMESARTAN MEDOXOMIL 40 MG/1
40 TABLET ORAL DAILY
Qty: 90 TABLET | Refills: 1 | Status: SHIPPED | OUTPATIENT
Start: 2024-04-25

## 2024-04-25 RX ORDER — AMLODIPINE BESYLATE 5 MG/1
5 TABLET ORAL EVERY MORNING
Qty: 90 TABLET | Refills: 1 | Status: SHIPPED | OUTPATIENT
Start: 2024-04-25 | End: 2024-10-22

## 2024-04-25 RX ORDER — ROSUVASTATIN CALCIUM 20 MG/1
20 TABLET, COATED ORAL NIGHTLY
Qty: 90 TABLET | Refills: 1 | Status: SHIPPED | OUTPATIENT
Start: 2024-04-25 | End: 2024-10-22

## 2024-04-25 RX ORDER — ATENOLOL 25 MG/1
25 TABLET ORAL EVERY MORNING
Qty: 90 TABLET | Refills: 1 | Status: SHIPPED | OUTPATIENT
Start: 2024-04-25 | End: 2024-10-22

## 2024-04-25 RX ORDER — HYDROCHLOROTHIAZIDE 12.5 MG/1
12.5 CAPSULE, GELATIN COATED ORAL EVERY MORNING
Qty: 90 CAPSULE | Refills: 1 | Status: SHIPPED | OUTPATIENT
Start: 2024-04-25

## 2024-04-25 SDOH — ECONOMIC STABILITY: FOOD INSECURITY: WITHIN THE PAST 12 MONTHS, THE FOOD YOU BOUGHT JUST DIDN'T LAST AND YOU DIDN'T HAVE MONEY TO GET MORE.: NEVER TRUE

## 2024-04-25 SDOH — ECONOMIC STABILITY: FOOD INSECURITY: WITHIN THE PAST 12 MONTHS, YOU WORRIED THAT YOUR FOOD WOULD RUN OUT BEFORE YOU GOT MONEY TO BUY MORE.: NEVER TRUE

## 2024-04-25 SDOH — ECONOMIC STABILITY: INCOME INSECURITY: HOW HARD IS IT FOR YOU TO PAY FOR THE VERY BASICS LIKE FOOD, HOUSING, MEDICAL CARE, AND HEATING?: NOT HARD AT ALL

## 2024-04-25 ASSESSMENT — ENCOUNTER SYMPTOMS
EYE DISCHARGE: 0
SINUS PAIN: 0
SORE THROAT: 0
BLOOD IN STOOL: 0
ABDOMINAL PAIN: 0
SHORTNESS OF BREATH: 0
NAUSEA: 0
EYE PAIN: 0

## 2024-04-25 NOTE — PROGRESS NOTES
motion.   Skin:     General: Skin is warm and dry.   Neurological:      Mental Status: He is alert and oriented to person, place, and time.   Psychiatric:         Behavior: Behavior normal.          Labs :    Lab Results   Component Value Date    WBC 6.5 01/09/2024    HGB 16.5 01/09/2024    HCT 48.5 01/09/2024     (L) 01/09/2024    CHOL 190 03/13/2023    TRIG 290 (H) 03/13/2023    HDL 49 01/09/2024    ALT 38 01/09/2024    AST 31 01/09/2024     01/09/2024    K 4.1 01/09/2024     01/09/2024    CREATININE 0.9 01/09/2024    BUN 9 01/09/2024    CO2 25 01/09/2024    TSH 5.45 (H) 04/22/2024    PSA 5.87 (H) 03/07/2024    INR 0.9 06/17/2012    GLUF 84 01/09/2024    LABA1C 4.9 04/01/2021     Lab Results   Component Value Date/Time    COLORU YELLOW 01/26/2011 09:40 PM    NITRU NEGATIVE 01/26/2011 09:40 PM    GLUCOSEU NEG 10/04/2021 04:17 PM    GLUCOSEU NEGATIVE 01/26/2011 09:40 PM    KETUA NEG 10/04/2021 04:17 PM    KETUA NEGATIVE 01/26/2011 09:40 PM    UROBILINOGEN 0.2 01/26/2011 09:40 PM    BILIRUBINUR NEG 10/04/2021 04:17 PM    BILIRUBINUR NEGATIVE 01/26/2011 09:40 PM     Lab Results   Component Value Date/Time    PSA 5.87 03/07/2024 02:33 PM             ASSESSMENT     Patient Active Problem List    Diagnosis Date Noted    Thrombocytopenia (HCC) 03/29/2023    Accelerated hypertension 03/22/2022    Near syncope 03/21/2022    Pre-syncope 03/21/2022    Hepatic steatosis 02/16/2022    Prostate cancer (HCC) 10/04/2021    Dizziness 10/04/2021    Elevated liver enzymes 10/04/2021    Essential hypertension 10/04/2021    Complex tear of medial meniscus of right knee as current injury 11/14/2019    ALT (SGPT) level raised 11/15/2018    Primary osteoarthritis of right knee 08/29/2017    Right hip tendonitis 06/27/2016    Trochanteric bursitis, right hip 02/15/2016    Deviated septum 12/16/2015    Nasal turbinate hypertrophy 12/16/2015    Nasal congestion 12/16/2015    Chronic maxillary sinusitis 12/16/2015

## 2024-05-28 ENCOUNTER — HOSPITAL ENCOUNTER (OUTPATIENT)
Age: 64
Discharge: HOME | End: 2024-05-30

## 2024-06-07 LAB — SURGICAL PATHOLOGY REPORT: NORMAL

## 2024-06-24 LAB — SURGICAL PATHOLOGY REPORT: NORMAL

## 2024-07-14 DIAGNOSIS — I10 ESSENTIAL HYPERTENSION: ICD-10-CM

## 2024-07-15 RX ORDER — ATENOLOL 25 MG/1
25 TABLET ORAL EVERY MORNING
Qty: 90 TABLET | Refills: 1 | OUTPATIENT
Start: 2024-07-15 | End: 2025-01-11

## 2024-07-30 ENCOUNTER — OFFICE VISIT (OUTPATIENT)
Dept: ORTHOPEDIC SURGERY | Age: 64
End: 2024-07-30
Payer: MEDICARE

## 2024-07-30 VITALS — WEIGHT: 198 LBS | BODY MASS INDEX: 28.35 KG/M2 | HEIGHT: 70 IN

## 2024-07-30 DIAGNOSIS — M25.562 ACUTE PAIN OF LEFT KNEE: Primary | ICD-10-CM

## 2024-07-30 DIAGNOSIS — D69.6 THROMBOCYTOPENIA (HCC): ICD-10-CM

## 2024-07-30 DIAGNOSIS — E78.5 HYPERLIPIDEMIA, UNSPECIFIED HYPERLIPIDEMIA TYPE: ICD-10-CM

## 2024-07-30 DIAGNOSIS — I25.10 CORONARY ARTERY DISEASE INVOLVING NATIVE CORONARY ARTERY OF NATIVE HEART WITHOUT ANGINA PECTORIS: ICD-10-CM

## 2024-07-30 DIAGNOSIS — E03.9 HYPOTHYROIDISM, UNSPECIFIED TYPE: ICD-10-CM

## 2024-07-30 DIAGNOSIS — M17.12 PRIMARY OSTEOARTHRITIS OF LEFT KNEE: Primary | ICD-10-CM

## 2024-07-30 DIAGNOSIS — I10 ESSENTIAL HYPERTENSION: ICD-10-CM

## 2024-07-30 LAB
ALBUMIN: 4.7 G/DL (ref 3.5–5.2)
ALT SERPL-CCNC: 36 U/L (ref 0–40)
ANION GAP SERPL CALCULATED.3IONS-SCNC: 18 MMOL/L (ref 7–16)
AST SERPL-CCNC: 39 U/L (ref 0–39)
BACTERIA: ABNORMAL
BASOPHILS ABSOLUTE: 0.03 K/UL (ref 0–0.2)
BASOPHILS RELATIVE PERCENT: 1 % (ref 0–2)
BILIRUB SERPL-MCNC: 0.7 MG/DL (ref 0–1.2)
BILIRUBIN, URINE: NEGATIVE
BUN BLDV-MCNC: 10 MG/DL (ref 6–23)
CALCIUM SERPL-MCNC: 9.7 MG/DL (ref 8.6–10.2)
CHLORIDE BLD-SCNC: 100 MMOL/L (ref 98–107)
CHOLESTEROL, FASTING: 176 MG/DL
CO2: 23 MMOL/L (ref 22–29)
COLOR, UA: YELLOW
CREAT SERPL-MCNC: 0.9 MG/DL (ref 0.7–1.2)
EOSINOPHILS ABSOLUTE: 0.08 K/UL (ref 0.05–0.5)
EOSINOPHILS RELATIVE PERCENT: 1 % (ref 0–6)
GFR, ESTIMATED: >90 ML/MIN/1.73M2
GLUCOSE FASTING: 104 MG/DL (ref 74–99)
GLUCOSE URINE: NEGATIVE MG/DL
HCT VFR BLD CALC: 49.4 % (ref 37–54)
HDLC SERPL-MCNC: 63 MG/DL
HEMOGLOBIN: 16.8 G/DL (ref 12.5–16.5)
IMMATURE GRANULOCYTES %: 0 % (ref 0–5)
IMMATURE GRANULOCYTES ABSOLUTE: <0.03 K/UL (ref 0–0.58)
KETONES, URINE: NEGATIVE MG/DL
LDL CHOLESTEROL: 92 MG/DL
LEUKOCYTE ESTERASE, URINE: NEGATIVE
LYMPHOCYTES ABSOLUTE: 1.57 K/UL (ref 1.5–4)
LYMPHOCYTES RELATIVE PERCENT: 25 % (ref 20–42)
MCH RBC QN AUTO: 32.3 PG (ref 26–35)
MCHC RBC AUTO-ENTMCNC: 34 G/DL (ref 32–34.5)
MCV RBC AUTO: 95 FL (ref 80–99.9)
MONOCYTES ABSOLUTE: 0.67 K/UL (ref 0.1–0.95)
MONOCYTES RELATIVE PERCENT: 11 % (ref 2–12)
NEUTROPHILS ABSOLUTE: 3.95 K/UL (ref 1.8–7.3)
NEUTROPHILS RELATIVE PERCENT: 63 % (ref 43–80)
NITRITE, URINE: NEGATIVE
PDW BLD-RTO: 12.8 % (ref 11.5–15)
PH, URINE: 8 (ref 5–9)
PLATELET # BLD: 151 K/UL (ref 130–450)
PMV BLD AUTO: 11 FL (ref 7–12)
POTASSIUM SERPL-SCNC: 4.8 MMOL/L (ref 3.5–5)
PROTEIN UA: NEGATIVE MG/DL
RBC # BLD: 5.2 M/UL (ref 3.8–5.8)
RBC UA: ABNORMAL /HPF
SPECIFIC GRAVITY UA: 1.01 (ref 1–1.03)
T3 FREE: 3.07 PG/ML (ref 2–4.4)
T4 FREE: 1.2 NG/DL (ref 0.9–1.7)
TOTAL PROTEIN: 7.4 G/DL (ref 6.4–8.3)
TRIGLYCERIDE, FASTING: 107 MG/DL
TSH SERPL DL<=0.05 MIU/L-ACNC: 2.76 UIU/ML (ref 0.27–4.2)
TURBIDITY: CLEAR
URINE HGB: NEGATIVE
UROBILINOGEN, URINE: 0.2 EU/DL (ref 0–1)
VLDLC SERPL CALC-MCNC: 21 MG/DL
WBC # BLD: 6.3 K/UL (ref 4.5–11.5)
WBC UA: ABNORMAL /HPF

## 2024-07-30 PROCEDURE — G8427 DOCREV CUR MEDS BY ELIG CLIN: HCPCS | Performed by: ORTHOPAEDIC SURGERY

## 2024-07-30 PROCEDURE — 20610 DRAIN/INJ JOINT/BURSA W/O US: CPT | Performed by: ORTHOPAEDIC SURGERY

## 2024-07-30 PROCEDURE — 99213 OFFICE O/P EST LOW 20 MIN: CPT | Performed by: ORTHOPAEDIC SURGERY

## 2024-07-30 PROCEDURE — 1123F ACP DISCUSS/DSCN MKR DOCD: CPT | Performed by: ORTHOPAEDIC SURGERY

## 2024-07-30 PROCEDURE — 1036F TOBACCO NON-USER: CPT | Performed by: ORTHOPAEDIC SURGERY

## 2024-07-30 PROCEDURE — G8419 CALC BMI OUT NRM PARAM NOF/U: HCPCS | Performed by: ORTHOPAEDIC SURGERY

## 2024-07-30 PROCEDURE — 3017F COLORECTAL CA SCREEN DOC REV: CPT | Performed by: ORTHOPAEDIC SURGERY

## 2024-07-30 NOTE — PROGRESS NOTES
Hypertension Father     Thyroid Disease Father     Other Father         adbominal aortic aneurysm     High Blood Pressure Father     High Cholesterol Father     Hypertension Sister     Hypertension Brother     Hypertension Brother        Review of Systems:     Skin: (-) rash,(-) psoriasis,(-) eczema, (-)skin cancer.   Musculoskeletal: (-) fractures,  (-) dislocations,(-) collagen vascular disease, (-) fibromyalgia, (-) multiple sclerosis, (-) muscular dystrophy, (-) RSD,(-) joint pain (-)swelling, (-) joint pain,swelling.  Neurologic: (-) epilepsy, (-)seizures,(-) brain tumor,(-) TIA, (-)stroke, (-)headaches, (-)Parkinson disease,(-) memory loss, (-) LOC.  Cardiovascular: (-) Chest pain, (-) swelling in legs/feet, (-) SOB, (-) cramping in legs/feet with walking.    Constitutional:  The patient is alert and oriented x 3, appears to be stated age and in no distress.Ht 1.778 m (5' 10\")   Wt 89.8 kg (198 lb)   BMI 28.41 kg/m²     Skin:  Upon inspection: the skin appears warm, dry and intact.  There is not a previous scar over the affected area.There is not any cellulitis, lymphedema or cutaneous lesions noted in the lower extremities.   Upon palpation there is no induration noted.      Neurologic:  Gait: normal;  Motor exam of the lower extremities show ; quadriceps, hamstrings, foot dorsi and plantar flexors intact R.  5/5 and L. 5/5. Deep tendon reflexes are 2/4 at the knees and 2/4 at the ankles with strong extensor hallicus longus motor strength bilaterally. Sensory to both feet is intact to all sensory roots.    Cardiovascular:  The vascular exam is normal and is well perfused to distal extremities.  Distal pulses DP/PT: R. 2+; L. 2+.  There is cap refill noted less than two seconds in all digits. There is not edema of the bilateral lower extremities.  There is not varicosities noted in the distal extremities.      Lymph:  Upon palpation,  there is no lymphadenopathy noted in bilateral lower extremities.

## 2024-07-31 RX ORDER — TRIAMCINOLONE ACETONIDE 40 MG/ML
40 INJECTION, SUSPENSION INTRA-ARTICULAR; INTRAMUSCULAR ONCE
Status: COMPLETED | OUTPATIENT
Start: 2024-07-31 | End: 2024-07-31

## 2024-07-31 RX ADMIN — TRIAMCINOLONE ACETONIDE 40 MG: 40 INJECTION, SUSPENSION INTRA-ARTICULAR; INTRAMUSCULAR at 14:48

## 2024-07-31 SDOH — HEALTH STABILITY: PHYSICAL HEALTH: ON AVERAGE, HOW MANY DAYS PER WEEK DO YOU ENGAGE IN MODERATE TO STRENUOUS EXERCISE (LIKE A BRISK WALK)?: 6 DAYS

## 2024-07-31 SDOH — HEALTH STABILITY: PHYSICAL HEALTH: ON AVERAGE, HOW MANY MINUTES DO YOU ENGAGE IN EXERCISE AT THIS LEVEL?: 150+ MIN

## 2024-07-31 ASSESSMENT — LIFESTYLE VARIABLES
HAS A RELATIVE, FRIEND, DOCTOR, OR ANOTHER HEALTH PROFESSIONAL EXPRESSED CONCERN ABOUT YOUR DRINKING OR SUGGESTED YOU CUT DOWN: YES, BUT NOT IN THE PAST YEAR
HOW OFTEN DURING THE LAST YEAR HAVE YOU FAILED TO DO WHAT WAS NORMALLY EXPECTED FROM YOU BECAUSE OF DRINKING: NEVER
HOW OFTEN DURING THE LAST YEAR HAVE YOU FAILED TO DO WHAT WAS NORMALLY EXPECTED FROM YOU BECAUSE OF DRINKING: NEVER
HOW OFTEN DO YOU HAVE A DRINK CONTAINING ALCOHOL: 4 OR MORE TIMES A WEEK
HOW OFTEN DURING THE LAST YEAR HAVE YOU FOUND THAT YOU WERE NOT ABLE TO STOP DRINKING ONCE YOU HAD STARTED: NEVER
HOW OFTEN DURING THE LAST YEAR HAVE YOU HAD A FEELING OF GUILT OR REMORSE AFTER DRINKING: LESS THAN MONTHLY
HOW OFTEN DURING THE LAST YEAR HAVE YOU HAD A FEELING OF GUILT OR REMORSE AFTER DRINKING: LESS THAN MONTHLY
HOW OFTEN DURING THE LAST YEAR HAVE YOU FOUND THAT YOU WERE NOT ABLE TO STOP DRINKING ONCE YOU HAD STARTED: NEVER
HOW OFTEN DURING THE LAST YEAR HAVE YOU BEEN UNABLE TO REMEMBER WHAT HAPPENED THE NIGHT BEFORE BECAUSE YOU HAD BEEN DRINKING: LESS THAN MONTHLY
HAVE YOU OR SOMEONE ELSE BEEN INJURED AS A RESULT OF YOUR DRINKING: NO
HAVE YOU OR SOMEONE ELSE BEEN INJURED AS A RESULT OF YOUR DRINKING: NO
HOW MANY STANDARD DRINKS CONTAINING ALCOHOL DO YOU HAVE ON A TYPICAL DAY: 7 TO 9
HOW OFTEN DO YOU HAVE SIX OR MORE DRINKS ON ONE OCCASION: 5
HOW OFTEN DURING THE LAST YEAR HAVE YOU NEEDED AN ALCOHOLIC DRINK FIRST THING IN THE MORNING TO GET YOURSELF GOING AFTER A NIGHT OF HEAVY DRINKING: NEVER
HOW MANY STANDARD DRINKS CONTAINING ALCOHOL DO YOU HAVE ON A TYPICAL DAY: 4
HAS A RELATIVE, FRIEND, DOCTOR, OR ANOTHER HEALTH PROFESSIONAL EXPRESSED CONCERN ABOUT YOUR DRINKING OR SUGGESTED YOU CUT DOWN: YES, BUT NOT IN THE PAST YEAR
HOW OFTEN DURING THE LAST YEAR HAVE YOU NEEDED AN ALCOHOLIC DRINK FIRST THING IN THE MORNING TO GET YOURSELF GOING AFTER A NIGHT OF HEAVY DRINKING: NEVER
HOW OFTEN DO YOU HAVE A DRINK CONTAINING ALCOHOL: 5
HOW OFTEN DURING THE LAST YEAR HAVE YOU BEEN UNABLE TO REMEMBER WHAT HAPPENED THE NIGHT BEFORE BECAUSE YOU HAD BEEN DRINKING: LESS THAN MONTHLY

## 2024-07-31 ASSESSMENT — PATIENT HEALTH QUESTIONNAIRE - PHQ9
SUM OF ALL RESPONSES TO PHQ QUESTIONS 1-9: 0
SUM OF ALL RESPONSES TO PHQ9 QUESTIONS 1 & 2: 0
1. LITTLE INTEREST OR PLEASURE IN DOING THINGS: NOT AT ALL
2. FEELING DOWN, DEPRESSED OR HOPELESS: NOT AT ALL

## 2024-08-05 ENCOUNTER — OFFICE VISIT (OUTPATIENT)
Dept: FAMILY MEDICINE CLINIC | Age: 64
End: 2024-08-05
Payer: MEDICARE

## 2024-08-05 VITALS
BODY MASS INDEX: 28.13 KG/M2 | HEIGHT: 70 IN | SYSTOLIC BLOOD PRESSURE: 162 MMHG | HEART RATE: 62 BPM | TEMPERATURE: 98.5 F | DIASTOLIC BLOOD PRESSURE: 84 MMHG | WEIGHT: 196.5 LBS | OXYGEN SATURATION: 99 %

## 2024-08-05 DIAGNOSIS — Z00.00 WELCOME TO MEDICARE PREVENTIVE VISIT: Primary | ICD-10-CM

## 2024-08-05 DIAGNOSIS — Z13.6 SCREENING FOR AAA (ABDOMINAL AORTIC ANEURYSM): ICD-10-CM

## 2024-08-05 PROCEDURE — 3077F SYST BP >= 140 MM HG: CPT | Performed by: INTERNAL MEDICINE

## 2024-08-05 PROCEDURE — 1123F ACP DISCUSS/DSCN MKR DOCD: CPT | Performed by: INTERNAL MEDICINE

## 2024-08-05 PROCEDURE — G0402 INITIAL PREVENTIVE EXAM: HCPCS | Performed by: INTERNAL MEDICINE

## 2024-08-05 PROCEDURE — 3017F COLORECTAL CA SCREEN DOC REV: CPT | Performed by: INTERNAL MEDICINE

## 2024-08-05 PROCEDURE — 3079F DIAST BP 80-89 MM HG: CPT | Performed by: INTERNAL MEDICINE

## 2024-08-05 NOTE — PATIENT INSTRUCTIONS
doctor if you think you are having a problem with your medicine.     If your doctor recommends aspirin, take the amount directed each day. Make sure you take aspirin and not another kind of pain reliever, such as acetaminophen (Tylenol).   When should you call for help?   Call 911 if you have symptoms of a heart attack. These may include:    Chest pain or pressure, or a strange feeling in the chest.     Sweating.     Shortness of breath.     Pain, pressure, or a strange feeling in the back, neck, jaw, or upper belly or in one or both shoulders or arms.     Lightheadedness or sudden weakness.     A fast or irregular heartbeat.   After you call 911, the  may tell you to chew 1 adult-strength or 2 to 4 low-dose aspirin. Wait for an ambulance. Do not try to drive yourself.  Watch closely for changes in your health, and be sure to contact your doctor if you have any problems.  Where can you learn more?  Go to https://www.[employer].net/patientEd and enter F075 to learn more about \"A Healthy Heart: Care Instructions.\"  Current as of: June 24, 2023  Content Version: 14.1  © 0163-0485 [employer].   Care instructions adapted under license by [employer]. If you have questions about a medical condition or this instruction, always ask your healthcare professional. [employer] disclaims any warranty or liability for your use of this information.      Personalized Preventive Plan for Shaheed Clayton - 8/5/2024  Medicare offers a range of preventive health benefits. Some of the tests and screenings are paid in full while other may be subject to a deductible, co-insurance, and/or copay.    Some of these benefits include a comprehensive review of your medical history including lifestyle, illnesses that may run in your family, and various assessments and screenings as appropriate.    After reviewing your medical record and screening and assessments performed today your provider may have ordered

## 2024-08-05 NOTE — PROGRESS NOTES
Medicare Annual Wellness Visit    Shaheed Clayton is here for Medicare AWV, Discuss Labs (Completed on 07/30/2024 ), and Health Maintenance (Pneumonia vaccine - due, RSV  )    Assessment & Plan   Welcome to Medicare preventive visit  Screening for AAA (abdominal aortic aneurysm)  -     US ABDOMINAL AORTA LIMITED; Future    Recommendations for Preventive Services Due: see orders and patient instructions/AVS.  Recommended screening schedule for the next 5-10 years is provided to the patient in written form: see Patient Instructions/AVS.     Return in 1 year (on 8/5/2025) for Medicare Annual Wellness Visit in 1 year, Annual Medicare Wellness Exam.     Subjective       Feeling well    Had Left knee Inj Zilretta by Dr Christiansen- with good result      Patient's complete Health Risk Assessment and screening values have been reviewed and are found in Flowsheets. The following problems were reviewed today and where indicated follow up appointments were made and/or referrals ordered.    Positive Risk Factor Screenings with Interventions:         Alcohol Screening:  AUDIT-C Score: 11  AUDIT Total Score: 15  Total Score Interpretation: 15 or more indicates the likelihood of alcohol dependence in men   Interventions:  Alcohol consumption guidelines reviewed. Moderation recommended  See AVS for additional education material                 Dentist Screen:  Have you seen the dentist within the past year?: (!) No    Intervention:  Advised to schedule with their dentist  See AVS for additional education material      Safety:  Do you have non-slip mats or non-slip surfaces or shower bars or grab bars in your shower or bathtub?: (!) No  Interventions:  See AVS for additional education material     Advanced Directives:  Do you have a Living Will?: (!) No    Intervention:  has NO advanced directive - information provided                     Objective   Vitals:    08/05/24 1053 08/05/24 1101   BP: (!) 158/82 (!) 162/84   Position: Sitting

## 2024-08-22 DIAGNOSIS — M25.561 RIGHT KNEE PAIN, UNSPECIFIED CHRONICITY: Primary | ICD-10-CM

## 2024-08-26 ENCOUNTER — OFFICE VISIT (OUTPATIENT)
Dept: ORTHOPEDIC SURGERY | Age: 64
End: 2024-08-26

## 2024-08-26 VITALS — WEIGHT: 196 LBS | TEMPERATURE: 98.6 F | HEIGHT: 70 IN | BODY MASS INDEX: 28.06 KG/M2

## 2024-08-26 DIAGNOSIS — M17.11 PRIMARY OSTEOARTHRITIS OF RIGHT KNEE: Primary | ICD-10-CM

## 2024-08-26 NOTE — PROGRESS NOTES
Chief Complaint   Patient presents with    Knee Pain     Rt knee pain for one week Denies injury. Patient's pain today 2/10.        Shaheed Clayton returns today for follow-up his right knee.  He had increased pain when he made the appointment and the swelling is down today.    Past Medical History:   Diagnosis Date    Alcohol abuse     drinks 8-10 beers daily    BPH with elevated PSA     CAD (coronary artery disease)     Cancer (HCC) 08/01/2021    Grade 1 prostrate cancer     Chest pain 1/26/11    suggestive of unstable angina    DJD (degenerative joint disease)     right knee     Gastritis 2/10    on EGD    GERD (gastroesophageal reflux disease)     H/O exercise stress test 1/26/11    Treadmill nuclear stress test: Srinivasan protocol. 8 min, 30 sec. 95% max predicted heart rate. Physiologic BP response. Patient developed chest pain at 7 1/2 min into exercise. Had around 2 mm ST segment depressions. Nuclear images read as showing no evidence of stress-induced ischemia    H/O exercise stress test 9/9/11    Treadmill nuclear stress test: Srinivasan protocol. 9 min and 30 sec. 101% of max predicted heart rate. Hypertensive BP response. No CP. No ischemic EKG changes. Nuclear images showed soft tissue attenuation artifacts. There did not appear to be any evidence of stress-induced ischemia on study. Gated views did not show any regional wall motion abnormality with possible hyperdynamic LVSF, EF 77%     Hx of cardiovascular stress test 10/29/2019    Nuclear treadmill stress test    Hyperlipidemia     Hypertension     Hypothyroidism     PONV (postoperative nausea and vomiting)     with anesthesia    Thrombocytopenia (HCC)     Thyroid disease      Past Surgical History:   Procedure Laterality Date    CARDIAC SURGERY  01/2011    PTCA with stent, LAD    COLONOSCOPY  08/04/2011    normal    COLONOSCOPY N/A 03/21/2024    COLONOSCOPY DIAGNOSTIC performed by Abilio Charles MD at [facility] ENDOSCOPY    DIAGNOSTIC CARDIAC CATH LAB  PROCEDURE  01/27/2011    Normal LV size and systolic function. Elevated LVEDP suggestive of diastolic dysfunction. Mild peak-to-peak gradient across the AV on pullback. Successful balloon angioplasty with deployment of ROSE to LAD with excellent results with adjunctive Angiomax therapy     HAND SURGERY Left 1978    fracture    KNEE ARTHROPLASTY      KNEE ARTHROSCOPY Right 2006    KNEE ARTHROSCOPY Right 11/14/2019    medial meniscectomy and debridement     KNEE ARTHROSCOPY Right 11/14/2019    RIGHT KNEE ARTHROSCOPY AND DEBRIDEMENT, CHONDROPLASTY AND SYNOVECTOMY performed by Huber Christiansen DO at [facility] OR    PROSTATE BIOPSY  08/01/2021    PTCA  1/27/2011    ROTATOR CUFF REPAIR Right 12/2010    SEPTOPLASTY  12/2015       Current Outpatient Medications:     amLODIPine (NORVASC) 5 MG tablet, Take 1 tablet by mouth every morning, Disp: 90 tablet, Rfl: 1    rosuvastatin (CRESTOR) 20 MG tablet, Take 1 tablet by mouth at bedtime, Disp: 90 tablet, Rfl: 1    atenolol (TENORMIN) 25 MG tablet, Take 1 tablet by mouth every morning, Disp: 90 tablet, Rfl: 1    olmesartan (BENICAR) 40 MG tablet, Take 1 tablet by mouth daily, Disp: 90 tablet, Rfl: 1    hydroCHLOROthiazide 12.5 MG capsule, Take 1 capsule by mouth every morning, Disp: 90 capsule, Rfl: 1    Black Pepper-Turmeric (TURMERIC PLUS BLACK PEPPER EXT) 5-500 MG CAPS, , Disp: , Rfl:     Cholecalciferol (VITAMIN D) 50 MCG (2000 UT) CAPS capsule, Take by mouth, Disp: , Rfl:     sildenafil (REVATIO) 20 MG tablet, TAKE 1 TO 5 TABLETS BY MOUTH AS NEEDED, Disp: , Rfl:     Zinc 15 MG CAPS, Take 30 mg by mouth daily, Disp: , Rfl:     Coenzyme Q10 (CO Q 10) 100 MG CAPS, Take by mouth daily , Disp: , Rfl:     aspirin 81 MG tablet, Take 1 tablet by mouth daily, Disp: , Rfl:     Multiple Vitamins-Minerals (MULTIVITAL-M PO), Take  by mouth. Woody vits, Disp: , Rfl:   No Known Allergies  Social History     Socioeconomic History    Marital status:      Spouse name: Not on file

## 2024-10-14 ENCOUNTER — OFFICE VISIT (OUTPATIENT)
Dept: ORTHOPEDIC SURGERY | Age: 64
End: 2024-10-14
Payer: MEDICARE

## 2024-10-14 VITALS — WEIGHT: 192 LBS | BODY MASS INDEX: 27.49 KG/M2 | HEIGHT: 70 IN

## 2024-10-14 DIAGNOSIS — M17.11 PRIMARY OSTEOARTHRITIS OF RIGHT KNEE: Primary | ICD-10-CM

## 2024-10-14 DIAGNOSIS — M17.12 PRIMARY OSTEOARTHRITIS OF LEFT KNEE: ICD-10-CM

## 2024-10-14 PROCEDURE — 1123F ACP DISCUSS/DSCN MKR DOCD: CPT | Performed by: ORTHOPAEDIC SURGERY

## 2024-10-14 PROCEDURE — 3017F COLORECTAL CA SCREEN DOC REV: CPT | Performed by: ORTHOPAEDIC SURGERY

## 2024-10-14 PROCEDURE — 1036F TOBACCO NON-USER: CPT | Performed by: ORTHOPAEDIC SURGERY

## 2024-10-14 PROCEDURE — G8427 DOCREV CUR MEDS BY ELIG CLIN: HCPCS | Performed by: ORTHOPAEDIC SURGERY

## 2024-10-14 PROCEDURE — 20610 DRAIN/INJ JOINT/BURSA W/O US: CPT | Performed by: ORTHOPAEDIC SURGERY

## 2024-10-14 PROCEDURE — G8419 CALC BMI OUT NRM PARAM NOF/U: HCPCS | Performed by: ORTHOPAEDIC SURGERY

## 2024-10-14 PROCEDURE — G8484 FLU IMMUNIZE NO ADMIN: HCPCS | Performed by: ORTHOPAEDIC SURGERY

## 2024-10-14 PROCEDURE — 99213 OFFICE O/P EST LOW 20 MIN: CPT | Performed by: ORTHOPAEDIC SURGERY

## 2024-10-14 RX ORDER — TRIAMCINOLONE ACETONIDE 40 MG/ML
40 INJECTION, SUSPENSION INTRA-ARTICULAR; INTRAMUSCULAR ONCE
Status: COMPLETED | OUTPATIENT
Start: 2024-10-14 | End: 2024-10-14

## 2024-10-14 RX ADMIN — TRIAMCINOLONE ACETONIDE 40 MG: 40 INJECTION, SUSPENSION INTRA-ARTICULAR; INTRAMUSCULAR at 14:26

## 2024-10-14 NOTE — PROGRESS NOTES
No     Family History   Problem Relation Age of Onset    Coronary Art Dis Mother         presently 86     Heart Surgery Mother     Hypertension Mother     Heart Disease Mother     Heart Surgery Father 74        Aortic aneurysm  post op     Hypertension Father     Thyroid Disease Father     Other Father         adbominal aortic aneurysm     High Blood Pressure Father     High Cholesterol Father     Arrhythmia Father     Heart Disease Father     Hypertension Sister     Heart Disease Sister     Hypertension Brother     Hypertension Brother        Review of Systems:     Skin: (-) rash,(-) psoriasis,(-) eczema, (-)skin cancer.   Musculoskeletal: (-) fractures,  (-) dislocations,(-) collagen vascular disease, (-) fibromyalgia, (-) multiple sclerosis, (-) muscular dystrophy, (-) RSD,(-) joint pain (-)swelling, (-) joint pain,swelling.  Neurologic: (-) epilepsy, (-)seizures,(-) brain tumor,(-) TIA, (-)stroke, (-)headaches, (-)Parkinson disease,(-) memory loss, (-) LOC.  Cardiovascular: (-) Chest pain, (-) swelling in legs/feet, (-) SOB, (-) cramping in legs/feet with walking.    Constitutional:  The patient is alert and oriented x 3, appears to be stated age and in no distress.Ht 1.778 m (5' 10\")   Wt 87.1 kg (192 lb)   BMI 27.55 kg/m²     Skin:  Upon inspection: the skin appears warm, dry and intact.  There is not a previous scar over the affected area.There is not any cellulitis, lymphedema or cutaneous lesions noted in the lower extremities.   Upon palpation there is no induration noted.      Neurologic:  Gait: normal;  Motor exam of the lower extremities show ; quadriceps, hamstrings, foot dorsi and plantar flexors intact R.  5/5 and L. 5/5. Deep tendon reflexes are 2/4 at the knees and 2/4 at the ankles with strong extensor hallicus longus motor strength bilaterally. Sensory to both feet is intact to all sensory roots.    Cardiovascular:  The vascular exam is normal and is well perfused to distal extremities.

## 2024-10-19 DIAGNOSIS — I10 ESSENTIAL HYPERTENSION: ICD-10-CM

## 2024-10-21 DIAGNOSIS — E78.5 HYPERLIPIDEMIA, UNSPECIFIED HYPERLIPIDEMIA TYPE: ICD-10-CM

## 2024-10-21 DIAGNOSIS — I10 ESSENTIAL HYPERTENSION: ICD-10-CM

## 2024-10-21 RX ORDER — HYDROCHLOROTHIAZIDE 12.5 MG/1
12.5 CAPSULE ORAL EVERY MORNING
Qty: 90 CAPSULE | Refills: 0 | Status: SHIPPED | OUTPATIENT
Start: 2024-10-21

## 2024-10-21 RX ORDER — AMLODIPINE BESYLATE 5 MG/1
5 TABLET ORAL EVERY MORNING
Qty: 90 TABLET | Refills: 0 | Status: SHIPPED | OUTPATIENT
Start: 2024-10-21 | End: 2025-04-19

## 2024-10-21 RX ORDER — ROSUVASTATIN CALCIUM 20 MG/1
20 TABLET, COATED ORAL NIGHTLY
Qty: 90 TABLET | Refills: 0 | Status: SHIPPED | OUTPATIENT
Start: 2024-10-21 | End: 2025-04-19

## 2024-10-21 RX ORDER — OLMESARTAN MEDOXOMIL 40 MG/1
40 TABLET ORAL DAILY
Qty: 90 TABLET | Refills: 0 | Status: SHIPPED | OUTPATIENT
Start: 2024-10-21

## 2024-10-21 RX ORDER — ATENOLOL 25 MG/1
25 TABLET ORAL EVERY MORNING
Qty: 90 TABLET | Refills: 1 | OUTPATIENT
Start: 2024-10-21 | End: 2025-04-19

## 2024-10-21 RX ORDER — ATENOLOL 25 MG/1
25 TABLET ORAL EVERY MORNING
Qty: 90 TABLET | Refills: 0 | Status: SHIPPED | OUTPATIENT
Start: 2024-10-21 | End: 2025-04-19

## 2024-10-21 NOTE — TELEPHONE ENCOUNTER
Name of Medication(s) Requested:  Requested Prescriptions     Pending Prescriptions Disp Refills    amLODIPine (NORVASC) 5 MG tablet 90 tablet 0     Sig: Take 1 tablet by mouth every morning    rosuvastatin (CRESTOR) 20 MG tablet 90 tablet 0     Sig: Take 1 tablet by mouth at bedtime    atenolol (TENORMIN) 25 MG tablet 90 tablet 0     Sig: Take 1 tablet by mouth every morning    olmesartan (BENICAR) 40 MG tablet 90 tablet 0     Sig: Take 1 tablet by mouth daily    hydroCHLOROthiazide 12.5 MG capsule 90 capsule 0     Sig: Take 1 capsule by mouth every morning       Medication is on current medication list Yes    Dosage and directions were verified? Yes    Quantity verified: 90 day supply     Pharmacy Verified?  Yes    Last Appointment:  8/5/2024    Future appts:  Future Appointments   Date Time Provider Department Center   12/5/2024 10:15 AM Aster West MD [facility] DEP   8/11/2025 10:00 AM Aster West MD [facility] DEP        (If no appt send self scheduling link. .REFILLAPPT)  Scheduling request sent?     [] Yes  [x] No    Does patient need updated?  [] Yes  [x] No

## 2024-10-25 DIAGNOSIS — E78.5 HYPERLIPIDEMIA, UNSPECIFIED HYPERLIPIDEMIA TYPE: ICD-10-CM

## 2024-10-25 DIAGNOSIS — I10 ESSENTIAL HYPERTENSION: ICD-10-CM

## 2024-10-25 RX ORDER — ROSUVASTATIN CALCIUM 20 MG/1
20 TABLET, COATED ORAL NIGHTLY
Qty: 90 TABLET | Refills: 0 | OUTPATIENT
Start: 2024-10-25 | End: 2025-04-23

## 2024-10-25 RX ORDER — AMLODIPINE BESYLATE 5 MG/1
5 TABLET ORAL EVERY MORNING
Qty: 90 TABLET | Refills: 0 | OUTPATIENT
Start: 2024-10-25 | End: 2025-04-23

## 2024-10-25 RX ORDER — OLMESARTAN MEDOXOMIL 40 MG/1
40 TABLET ORAL DAILY
Qty: 90 TABLET | Refills: 0 | OUTPATIENT
Start: 2024-10-25

## 2024-10-25 RX ORDER — HYDROCHLOROTHIAZIDE 12.5 MG/1
12.5 CAPSULE ORAL EVERY MORNING
Qty: 90 CAPSULE | Refills: 0 | OUTPATIENT
Start: 2024-10-25

## 2024-11-08 DIAGNOSIS — S83.282A TEAR OF LATERAL MENISCUS OF LEFT KNEE, UNSPECIFIED TEAR TYPE, UNSPECIFIED WHETHER OLD OR CURRENT TEAR, INITIAL ENCOUNTER: ICD-10-CM

## 2024-11-08 DIAGNOSIS — S83.206A TEAR OF MENISCUS OF RIGHT KNEE, UNSPECIFIED MENISCUS, UNSPECIFIED TEAR TYPE, UNSPECIFIED WHETHER OLD OR CURRENT TEAR: Primary | ICD-10-CM

## 2024-11-17 ENCOUNTER — HOSPITAL ENCOUNTER (OUTPATIENT)
Dept: MRI IMAGING | Age: 64
Discharge: HOME | End: 2024-11-19
Attending: ORTHOPAEDIC SURGERY
Payer: MEDICARE

## 2024-11-17 DIAGNOSIS — S83.282A TEAR OF LATERAL MENISCUS OF LEFT KNEE, UNSPECIFIED TEAR TYPE, UNSPECIFIED WHETHER OLD OR CURRENT TEAR, INITIAL ENCOUNTER: ICD-10-CM

## 2024-11-17 DIAGNOSIS — S83.206A TEAR OF MENISCUS OF RIGHT KNEE, UNSPECIFIED MENISCUS, UNSPECIFIED TEAR TYPE, UNSPECIFIED WHETHER OLD OR CURRENT TEAR: ICD-10-CM

## 2024-11-17 PROCEDURE — 73721 MRI JNT OF LWR EXTRE W/O DYE: CPT

## 2024-11-19 ENCOUNTER — OFFICE VISIT (OUTPATIENT)
Dept: ORTHOPEDIC SURGERY | Age: 64
End: 2024-11-19

## 2024-11-19 VITALS — BODY MASS INDEX: 27.49 KG/M2 | TEMPERATURE: 98 F | HEIGHT: 70 IN | WEIGHT: 192 LBS

## 2024-11-19 DIAGNOSIS — M17.11 PRIMARY OSTEOARTHRITIS OF RIGHT KNEE: Primary | ICD-10-CM

## 2024-11-19 NOTE — PROGRESS NOTES
Chief Complaint   Patient presents with    Follow-up     Bilateral Knee: Pt has increased crepitus within the R Knee with ambulation. Pt declined catching and locking sensation with AROM. Pt has a sharp shooting pain when pain is present. Pt has swelling from time to time, nothing consistent yet. Pt has increased sleep disturbances in the b/l knees. Pt has increased difficulty with ADL's due to increased pain.        Shaheed Clayton returns today for follow-up of his bilateral knee pain.  He recently underwent bilateral knee MRI'ls and is here to discuss the results.    The patient has failed all attempts at conservative treatment including: cortisone injection, visco injections, zilretta injections, physician directed HEP, nsaids, pain medication, OTC medication, ice, heat, use of assistive device and activity restriction.     Past Medical History:   Diagnosis Date    Alcohol abuse     drinks 8-10 beers daily    BPH with elevated PSA     CAD (coronary artery disease)     Cancer (HCC) 08/01/2021    Grade 1 prostrate cancer     Chest pain 1/26/11    suggestive of unstable angina    DJD (degenerative joint disease)     right knee     Gastritis 2/10    on EGD    GERD (gastroesophageal reflux disease)     H/O exercise stress test 1/26/11    Treadmill nuclear stress test: Srinivasan protocol. 8 min, 30 sec. 95% max predicted heart rate. Physiologic BP response. Patient developed chest pain at 7 1/2 min into exercise. Had around 2 mm ST segment depressions. Nuclear images read as showing no evidence of stress-induced ischemia    H/O exercise stress test 9/9/11    Treadmill nuclear stress test: Srinivasan protocol. 9 min and 30 sec. 101% of max predicted heart rate. Hypertensive BP response. No CP. No ischemic EKG changes. Nuclear images showed soft tissue attenuation artifacts. There did not appear to be any evidence of stress-induced ischemia on study. Gated views did not show any regional wall motion abnormality with possible

## 2024-12-03 ENCOUNTER — OFFICE VISIT (OUTPATIENT)
Dept: ORTHOPEDIC SURGERY | Age: 64
End: 2024-12-03

## 2024-12-03 DIAGNOSIS — M17.11 PRIMARY OSTEOARTHRITIS OF RIGHT KNEE: Primary | ICD-10-CM

## 2024-12-05 ENCOUNTER — OFFICE VISIT (OUTPATIENT)
Dept: FAMILY MEDICINE CLINIC | Age: 64
End: 2024-12-05
Payer: MEDICARE

## 2024-12-05 VITALS
DIASTOLIC BLOOD PRESSURE: 82 MMHG | TEMPERATURE: 97.6 F | WEIGHT: 197.5 LBS | SYSTOLIC BLOOD PRESSURE: 138 MMHG | BODY MASS INDEX: 28.27 KG/M2 | OXYGEN SATURATION: 98 % | HEIGHT: 70 IN | HEART RATE: 75 BPM

## 2024-12-05 DIAGNOSIS — I25.10 CORONARY ARTERY DISEASE INVOLVING NATIVE CORONARY ARTERY OF NATIVE HEART WITHOUT ANGINA PECTORIS: ICD-10-CM

## 2024-12-05 DIAGNOSIS — I10 ESSENTIAL HYPERTENSION: Primary | ICD-10-CM

## 2024-12-05 DIAGNOSIS — R73.01 IFG (IMPAIRED FASTING GLUCOSE): ICD-10-CM

## 2024-12-05 DIAGNOSIS — Z23 FLU VACCINE NEED: ICD-10-CM

## 2024-12-05 DIAGNOSIS — E78.5 HYPERLIPIDEMIA, UNSPECIFIED HYPERLIPIDEMIA TYPE: ICD-10-CM

## 2024-12-05 DIAGNOSIS — E03.9 HYPOTHYROIDISM, UNSPECIFIED TYPE: ICD-10-CM

## 2024-12-05 PROCEDURE — 3017F COLORECTAL CA SCREEN DOC REV: CPT | Performed by: INTERNAL MEDICINE

## 2024-12-05 PROCEDURE — G0008 ADMIN INFLUENZA VIRUS VAC: HCPCS | Performed by: INTERNAL MEDICINE

## 2024-12-05 PROCEDURE — 1123F ACP DISCUSS/DSCN MKR DOCD: CPT | Performed by: INTERNAL MEDICINE

## 2024-12-05 PROCEDURE — G8484 FLU IMMUNIZE NO ADMIN: HCPCS | Performed by: INTERNAL MEDICINE

## 2024-12-05 PROCEDURE — 3075F SYST BP GE 130 - 139MM HG: CPT | Performed by: INTERNAL MEDICINE

## 2024-12-05 PROCEDURE — 90653 IIV ADJUVANT VACCINE IM: CPT | Performed by: INTERNAL MEDICINE

## 2024-12-05 PROCEDURE — 99214 OFFICE O/P EST MOD 30 MIN: CPT | Performed by: INTERNAL MEDICINE

## 2024-12-05 PROCEDURE — 1036F TOBACCO NON-USER: CPT | Performed by: INTERNAL MEDICINE

## 2024-12-05 PROCEDURE — 3079F DIAST BP 80-89 MM HG: CPT | Performed by: INTERNAL MEDICINE

## 2024-12-05 PROCEDURE — G8427 DOCREV CUR MEDS BY ELIG CLIN: HCPCS | Performed by: INTERNAL MEDICINE

## 2024-12-05 PROCEDURE — G8419 CALC BMI OUT NRM PARAM NOF/U: HCPCS | Performed by: INTERNAL MEDICINE

## 2024-12-05 RX ORDER — HYDROCHLOROTHIAZIDE 12.5 MG/1
12.5 CAPSULE ORAL EVERY EVENING
Qty: 90 CAPSULE | Refills: 1 | Status: SHIPPED | OUTPATIENT
Start: 2024-12-05

## 2024-12-05 RX ORDER — OLMESARTAN MEDOXOMIL 40 MG/1
40 TABLET ORAL EVERY EVENING
Qty: 90 TABLET | Refills: 1 | Status: SHIPPED | OUTPATIENT
Start: 2024-12-05

## 2024-12-05 RX ORDER — AMLODIPINE BESYLATE 5 MG/1
5 TABLET ORAL EVERY MORNING
Qty: 90 TABLET | Refills: 1 | Status: SHIPPED | OUTPATIENT
Start: 2024-12-05 | End: 2025-06-03

## 2024-12-05 RX ORDER — ATENOLOL 25 MG/1
25 TABLET ORAL EVERY MORNING
Qty: 90 TABLET | Refills: 1 | Status: SHIPPED | OUTPATIENT
Start: 2024-12-05 | End: 2025-06-03

## 2024-12-05 RX ORDER — ROSUVASTATIN CALCIUM 20 MG/1
20 TABLET, COATED ORAL NIGHTLY
Qty: 90 TABLET | Refills: 1 | Status: SHIPPED | OUTPATIENT
Start: 2024-12-05 | End: 2025-06-03

## 2024-12-05 ASSESSMENT — ENCOUNTER SYMPTOMS
BLOOD IN STOOL: 0
EYE DISCHARGE: 0
EYE PAIN: 0
SINUS PAIN: 0
ABDOMINAL PAIN: 0
SORE THROAT: 0
NAUSEA: 0
SHORTNESS OF BREATH: 0

## 2024-12-05 NOTE — PROGRESS NOTES
Chief Complaint   Patient presents with    Hypertension     Pt here for follow up on hypertension, pt reports feeling pretty good.      Health Maintenance     Flu vaccine, Pneumonia vaccine, RSV - pt will discuss        HPI:  Patient is here for follow-up     Feeling okay    No chest pain No angina    Occ palpitation    Following  with Dr Mims     Allergy and Medications are reviewed and updated.  Past Medical History, Surgical History, and Family History has been reviewed and updated.    Review of Systems:  Review of Systems   Constitutional:  Negative for chills and fever.   HENT:  Negative for congestion, sinus pain and sore throat.    Eyes:  Negative for pain and discharge.   Respiratory:  Negative for shortness of breath (No new SOb).    Cardiovascular:  Negative for chest pain.   Gastrointestinal:  Negative for abdominal pain, blood in stool and nausea.   Genitourinary:  Negative for flank pain and frequency.   Musculoskeletal:  Negative for neck pain.   Hematological:  Does not bruise/bleed easily.   Psychiatric/Behavioral:  Negative for suicidal ideas.          Vitals:    12/05/24 1008   BP: 138/82   Position: Sitting   Pulse: 75   Temp: 97.6 °F (36.4 °C)   TempSrc: Temporal   SpO2: 98%   Weight: 89.6 kg (197 lb 8 oz)   Height: 1.778 m (5' 10\")       Physical Exam  Vitals reviewed.   Constitutional:       Appearance: He is well-developed.   HENT:      Head: Normocephalic and atraumatic.   Eyes:      Conjunctiva/sclera: Conjunctivae normal.      Pupils: Pupils are equal, round, and reactive to light.   Neck:      Vascular: No JVD.   Cardiovascular:      Rate and Rhythm: Normal rate and regular rhythm.   Pulmonary:      Effort: Pulmonary effort is normal.      Breath sounds: Normal breath sounds.   Abdominal:      General: Bowel sounds are normal.      Palpations: Abdomen is soft.   Musculoskeletal:         General: Normal range of motion.   Skin:     General: Skin is warm and dry.   Neurological:      Mental

## 2024-12-06 NOTE — PROGRESS NOTES
Chief Complaint   Patient presents with    Injections     Right knee Zilretta injection        I will proceed with a cortisone injection in the Right knee.  Verbal and written consent was obtained for the injections. The skin was prepped with alcohol.  A prepared mixture of 32 mg of Zilretta and 5mL diluent was injected to Right knee. The injection was given through the lateral side of the knee. The patient tolerated the injection well. I will see the patient back prn.    Shaheed \"Estevan\" was seen today for injections.    Diagnoses and all orders for this visit:    Primary osteoarthritis of right knee  -     triamcinolone acetonide (ZILRETTA) intra-articular injection 32 mg

## 2024-12-11 DIAGNOSIS — I10 ESSENTIAL HYPERTENSION: ICD-10-CM

## 2024-12-11 DIAGNOSIS — R73.01 IFG (IMPAIRED FASTING GLUCOSE): ICD-10-CM

## 2024-12-11 DIAGNOSIS — E78.5 HYPERLIPIDEMIA, UNSPECIFIED HYPERLIPIDEMIA TYPE: ICD-10-CM

## 2024-12-11 LAB
ALBUMIN: 5 G/DL (ref 3.5–5.2)
ALP BLD-CCNC: 56 U/L (ref 40–129)
ALT SERPL-CCNC: 46 U/L (ref 0–40)
ANION GAP SERPL CALCULATED.3IONS-SCNC: 10 MMOL/L (ref 7–16)
AST SERPL-CCNC: 32 U/L (ref 0–39)
BACTERIA: ABNORMAL
BASOPHILS ABSOLUTE: 0.02 K/UL (ref 0–0.2)
BASOPHILS RELATIVE PERCENT: 0 % (ref 0–2)
BILIRUB SERPL-MCNC: 0.7 MG/DL (ref 0–1.2)
BILIRUBIN, URINE: NEGATIVE
BUN BLDV-MCNC: 12 MG/DL (ref 6–23)
CALCIUM SERPL-MCNC: 10.1 MG/DL (ref 8.6–10.2)
CHLORIDE BLD-SCNC: 99 MMOL/L (ref 98–107)
CHOLESTEROL, FASTING: 217 MG/DL
CO2: 28 MMOL/L (ref 22–29)
COLOR, UA: YELLOW
CREAT SERPL-MCNC: 0.8 MG/DL (ref 0.7–1.2)
EOSINOPHILS ABSOLUTE: 0.03 K/UL (ref 0.05–0.5)
EOSINOPHILS RELATIVE PERCENT: 0 % (ref 0–6)
GFR, ESTIMATED: >90 ML/MIN/1.73M2
GLUCOSE FASTING: 86 MG/DL (ref 74–99)
GLUCOSE URINE: NEGATIVE MG/DL
HBA1C MFR BLD: 5 % (ref 4–5.6)
HCT VFR BLD CALC: 49.6 % (ref 37–54)
HDLC SERPL-MCNC: 78 MG/DL
HEMOGLOBIN: 17 G/DL (ref 12.5–16.5)
IMMATURE GRANULOCYTES %: 1 % (ref 0–5)
IMMATURE GRANULOCYTES ABSOLUTE: 0.09 K/UL (ref 0–0.58)
KETONES, URINE: NEGATIVE MG/DL
LDL CHOLESTEROL: 118 MG/DL
LEUKOCYTE ESTERASE, URINE: NEGATIVE
LYMPHOCYTES ABSOLUTE: 1.82 K/UL (ref 1.5–4)
LYMPHOCYTES RELATIVE PERCENT: 20 % (ref 20–42)
MCH RBC QN AUTO: 33.6 PG (ref 26–35)
MCHC RBC AUTO-ENTMCNC: 34.3 G/DL (ref 32–34.5)
MCV RBC AUTO: 98 FL (ref 80–99.9)
MONOCYTES ABSOLUTE: 0.74 K/UL (ref 0.1–0.95)
MONOCYTES RELATIVE PERCENT: 8 % (ref 2–12)
NEUTROPHILS ABSOLUTE: 6.42 K/UL (ref 1.8–7.3)
NEUTROPHILS RELATIVE PERCENT: 70 % (ref 43–80)
NITRITE, URINE: NEGATIVE
PDW BLD-RTO: 13 % (ref 11.5–15)
PH, URINE: 7 (ref 5–9)
PLATELET # BLD: 181 K/UL (ref 130–450)
PMV BLD AUTO: 10.6 FL (ref 7–12)
POTASSIUM SERPL-SCNC: 4.4 MMOL/L (ref 3.5–5)
PROTEIN UA: NEGATIVE MG/DL
RBC # BLD: 5.06 M/UL (ref 3.8–5.8)
RBC UA: ABNORMAL /HPF
SODIUM BLD-SCNC: 137 MMOL/L (ref 132–146)
SPECIFIC GRAVITY UA: 1.01 (ref 1–1.03)
TOTAL PROTEIN: 7.6 G/DL (ref 6.4–8.3)
TRIGLYCERIDE, FASTING: 103 MG/DL
TSH SERPL DL<=0.05 MIU/L-ACNC: 2.64 UIU/ML (ref 0.27–4.2)
TURBIDITY: CLEAR
URINE HGB: NEGATIVE
UROBILINOGEN, URINE: 0.2 EU/DL (ref 0–1)
VLDLC SERPL CALC-MCNC: 21 MG/DL
WBC # BLD: 9.1 K/UL (ref 4.5–11.5)
WBC UA: ABNORMAL /HPF

## 2024-12-13 ENCOUNTER — PATIENT MESSAGE (OUTPATIENT)
Dept: FAMILY MEDICINE CLINIC | Age: 64
End: 2024-12-13

## 2024-12-13 DIAGNOSIS — R82.90 ABNORMAL URINE: Primary | ICD-10-CM

## 2024-12-16 DIAGNOSIS — R82.90 ABNORMAL URINE: ICD-10-CM

## 2024-12-17 ENCOUNTER — PREP FOR PROCEDURE (OUTPATIENT)
Dept: ORTHOPEDIC SURGERY | Age: 64
End: 2024-12-17

## 2024-12-17 PROBLEM — M17.11 RIGHT KNEE DJD: Status: ACTIVE | Noted: 2024-12-17

## 2024-12-18 LAB
CULTURE: NO GROWTH
SPECIMEN DESCRIPTION: NORMAL

## 2025-01-09 ENCOUNTER — HOSPITAL ENCOUNTER (OUTPATIENT)
Age: 65
Discharge: HOME | End: 2025-01-09
Payer: MEDICARE

## 2025-01-09 LAB
EKG ATRIAL RATE: 71 BPM
EKG P AXIS: 57 DEGREES
EKG P-R INTERVAL: 152 MS
EKG Q-T INTERVAL: 404 MS
EKG QRS DURATION: 102 MS
EKG QTC CALCULATION (BAZETT): 439 MS
EKG R AXIS: 18 DEGREES
EKG T AXIS: 38 DEGREES
EKG VENTRICULAR RATE: 71 BPM

## 2025-01-09 PROCEDURE — 93005 ELECTROCARDIOGRAM TRACING: CPT | Performed by: ANESTHESIOLOGY

## 2025-01-10 ENCOUNTER — ANESTHESIA EVENT (OUTPATIENT)
Dept: OPERATING ROOM | Age: 65
End: 2025-01-10
Payer: MEDICARE

## 2025-01-10 NOTE — ANESTHESIA PRE PROCEDURE
Department of Anesthesiology  Preprocedure Note       Name:  Shaheed Clayton   Age:  64 y.o.  :  1960                                          MRN:  [id number]         Date:  1/10/2025      Surgeon: Surgeon(s):  Huber Christiansen DO    Procedure: RIGHT KNEE ARTHROSCOPY MEDIAL MENISECTOMY AND DEBRIDEMENT-25 (Right)    Medications prior to admission:   Prior to Admission medications    Medication Sig Start Date End Date Taking? Authorizing Provider   atenolol (TENORMIN) 25 MG tablet Take 1 tablet by mouth every morning 12/5/24 6/3/25 Yes Aster West MD   hydroCHLOROthiazide 12.5 MG capsule Take 1 capsule by mouth every evening 24  Yes Aster West MD   olmesartan (BENICAR) 40 MG tablet Take 1 tablet by mouth every evening 24  Yes Aster West MD   rosuvastatin (CRESTOR) 20 MG tablet Take 1 tablet by mouth at bedtime 12/5/24 6/3/25 Yes Aster West MD   Black Pepper-Turmeric (TURMERIC PLUS BLACK PEPPER EXT) 5-500 MG CAPS  22  Yes Derrick Stack MD   Cholecalciferol (VITAMIN D) 50 MCG (2000 UT) CAPS capsule Take by mouth   Yes ProviderDerrick MD   Zinc 15 MG CAPS Take 30 mg by mouth daily   Yes Derrick Stack MD   Coenzyme Q10 (CO Q 10) 100 MG CAPS Take by mouth daily    Yes Derrick Stack MD   aspirin 81 MG tablet Take 1 tablet by mouth daily Pt not to hold per cardiologist   Yes Derrick Stack MD   Multiple Vitamins-Minerals (MULTIVITAL-M PO) Take  by mouth. Reeseklee vits   Yes Derrick Stack MD   amLODIPine (NORVASC) 5 MG tablet Take 1 tablet by mouth every morning 12/5/24 6/3/25  Aster West MD   sildenafil (REVATIO) 20 MG tablet TAKE 1 TO 5 TABLETS BY MOUTH AS NEEDED 22   Derrick Stack MD       Current medications:    No current facility-administered medications for this encounter.     Current Outpatient Medications   Medication Sig Dispense Refill    atenolol (TENORMIN) 25 MG tablet Take 1 tablet by

## 2025-01-13 ENCOUNTER — HOSPITAL ENCOUNTER (OUTPATIENT)
Age: 65
Setting detail: OUTPATIENT SURGERY
Discharge: HOME | End: 2025-01-13
Attending: ORTHOPAEDIC SURGERY | Admitting: ORTHOPAEDIC SURGERY
Payer: MEDICARE

## 2025-01-13 ENCOUNTER — ANESTHESIA (OUTPATIENT)
Dept: OPERATING ROOM | Age: 65
End: 2025-01-13
Payer: MEDICARE

## 2025-01-13 VITALS
HEIGHT: 70 IN | BODY MASS INDEX: 27.23 KG/M2 | HEART RATE: 57 BPM | TEMPERATURE: 98 F | OXYGEN SATURATION: 96 % | RESPIRATION RATE: 16 BRPM | WEIGHT: 190.2 LBS | DIASTOLIC BLOOD PRESSURE: 71 MMHG | SYSTOLIC BLOOD PRESSURE: 119 MMHG

## 2025-01-13 DIAGNOSIS — M17.11 OSTEOARTHRITIS OF RIGHT KNEE, UNSPECIFIED OSTEOARTHRITIS TYPE: Primary | ICD-10-CM

## 2025-01-13 DIAGNOSIS — M17.11 PRIMARY OSTEOARTHRITIS OF RIGHT KNEE: ICD-10-CM

## 2025-01-13 PROCEDURE — 3700000001 HC ADD 15 MINUTES (ANESTHESIA): Performed by: ORTHOPAEDIC SURGERY

## 2025-01-13 PROCEDURE — 3700000000 HC ANESTHESIA ATTENDED CARE: Performed by: ORTHOPAEDIC SURGERY

## 2025-01-13 PROCEDURE — 3600000003 HC SURGERY LEVEL 3 BASE: Performed by: ORTHOPAEDIC SURGERY

## 2025-01-13 PROCEDURE — 6360000002 HC RX W HCPCS: Performed by: NURSE ANESTHETIST, CERTIFIED REGISTERED

## 2025-01-13 PROCEDURE — 2720000010 HC SURG SUPPLY STERILE: Performed by: ORTHOPAEDIC SURGERY

## 2025-01-13 PROCEDURE — 7100000001 HC PACU RECOVERY - ADDTL 15 MIN: Performed by: ORTHOPAEDIC SURGERY

## 2025-01-13 PROCEDURE — 6360000002 HC RX W HCPCS

## 2025-01-13 PROCEDURE — 2709999900 HC NON-CHARGEABLE SUPPLY: Performed by: ORTHOPAEDIC SURGERY

## 2025-01-13 PROCEDURE — 7100000011 HC PHASE II RECOVERY - ADDTL 15 MIN: Performed by: ORTHOPAEDIC SURGERY

## 2025-01-13 PROCEDURE — 3600000013 HC SURGERY LEVEL 3 ADDTL 15MIN: Performed by: ORTHOPAEDIC SURGERY

## 2025-01-13 PROCEDURE — 6360000002 HC RX W HCPCS: Performed by: ORTHOPAEDIC SURGERY

## 2025-01-13 PROCEDURE — 2500000003 HC RX 250 WO HCPCS

## 2025-01-13 PROCEDURE — 7100000000 HC PACU RECOVERY - FIRST 15 MIN: Performed by: ORTHOPAEDIC SURGERY

## 2025-01-13 PROCEDURE — 7100000010 HC PHASE II RECOVERY - FIRST 15 MIN: Performed by: ORTHOPAEDIC SURGERY

## 2025-01-13 PROCEDURE — 2580000003 HC RX 258: Performed by: ANESTHESIOLOGY

## 2025-01-13 PROCEDURE — 29877 ARTHRS KNEE SURG DBRDMT/SHVG: CPT | Performed by: ORTHOPAEDIC SURGERY

## 2025-01-13 PROCEDURE — 6370000000 HC RX 637 (ALT 250 FOR IP): Performed by: ANESTHESIOLOGY

## 2025-01-13 PROCEDURE — 2500000003 HC RX 250 WO HCPCS: Performed by: NURSE ANESTHETIST, CERTIFIED REGISTERED

## 2025-01-13 RX ORDER — SODIUM CHLORIDE, SODIUM LACTATE, POTASSIUM CHLORIDE, CALCIUM CHLORIDE 600; 310; 30; 20 MG/100ML; MG/100ML; MG/100ML; MG/100ML
INJECTION, SOLUTION INTRAVENOUS CONTINUOUS
Status: DISCONTINUED | OUTPATIENT
Start: 2025-01-13 | End: 2025-01-13 | Stop reason: HOSPADM

## 2025-01-13 RX ORDER — NALOXONE HYDROCHLORIDE 0.4 MG/ML
INJECTION, SOLUTION INTRAMUSCULAR; INTRAVENOUS; SUBCUTANEOUS PRN
Status: DISCONTINUED | OUTPATIENT
Start: 2025-01-13 | End: 2025-01-13 | Stop reason: HOSPADM

## 2025-01-13 RX ORDER — EPHEDRINE SULFATE/0.9% NACL/PF 25 MG/5 ML
SYRINGE (ML) INTRAVENOUS
Status: DISCONTINUED | OUTPATIENT
Start: 2025-01-13 | End: 2025-01-13 | Stop reason: SDUPTHER

## 2025-01-13 RX ORDER — DEXAMETHASONE SODIUM PHOSPHATE 10 MG/ML
INJECTION, SOLUTION INTRAMUSCULAR; INTRAVENOUS
Status: DISCONTINUED | OUTPATIENT
Start: 2025-01-13 | End: 2025-01-13 | Stop reason: SDUPTHER

## 2025-01-13 RX ORDER — FENTANYL CITRATE 50 UG/ML
INJECTION, SOLUTION INTRAMUSCULAR; INTRAVENOUS
Status: DISCONTINUED | OUTPATIENT
Start: 2025-01-13 | End: 2025-01-13 | Stop reason: SDUPTHER

## 2025-01-13 RX ORDER — DIPHENHYDRAMINE HYDROCHLORIDE 50 MG/ML
INJECTION INTRAMUSCULAR; INTRAVENOUS
Status: DISCONTINUED | OUTPATIENT
Start: 2025-01-13 | End: 2025-01-13 | Stop reason: SDUPTHER

## 2025-01-13 RX ORDER — MIDAZOLAM HYDROCHLORIDE 1 MG/ML
INJECTION, SOLUTION INTRAMUSCULAR; INTRAVENOUS
Status: DISCONTINUED | OUTPATIENT
Start: 2025-01-13 | End: 2025-01-13 | Stop reason: SDUPTHER

## 2025-01-13 RX ORDER — SODIUM CHLORIDE 0.9 % (FLUSH) 0.9 %
5-40 SYRINGE (ML) INJECTION EVERY 12 HOURS SCHEDULED
Status: DISCONTINUED | OUTPATIENT
Start: 2025-01-13 | End: 2025-01-13 | Stop reason: HOSPADM

## 2025-01-13 RX ORDER — SODIUM CHLORIDE 9 MG/ML
INJECTION, SOLUTION INTRAVENOUS PRN
Status: DISCONTINUED | OUTPATIENT
Start: 2025-01-13 | End: 2025-01-13 | Stop reason: HOSPADM

## 2025-01-13 RX ORDER — LIDOCAINE HYDROCHLORIDE 20 MG/ML
INJECTION, SOLUTION INTRAVENOUS
Status: DISCONTINUED | OUTPATIENT
Start: 2025-01-13 | End: 2025-01-13 | Stop reason: SDUPTHER

## 2025-01-13 RX ORDER — GLYCOPYRROLATE 0.2 MG/ML
INJECTION INTRAMUSCULAR; INTRAVENOUS
Status: DISCONTINUED | OUTPATIENT
Start: 2025-01-13 | End: 2025-01-13 | Stop reason: SDUPTHER

## 2025-01-13 RX ORDER — PROPOFOL 10 MG/ML
INJECTION, EMULSION INTRAVENOUS
Status: DISCONTINUED | OUTPATIENT
Start: 2025-01-13 | End: 2025-01-13 | Stop reason: SDUPTHER

## 2025-01-13 RX ORDER — HYDROCODONE BITARTRATE AND ACETAMINOPHEN 5; 325 MG/1; MG/1
1 TABLET ORAL EVERY 6 HOURS PRN
Qty: 20 TABLET | Refills: 0 | Status: SHIPPED | OUTPATIENT
Start: 2025-01-13 | End: 2025-01-18

## 2025-01-13 RX ORDER — BUPIVACAINE HYDROCHLORIDE 2.5 MG/ML
INJECTION, SOLUTION EPIDURAL; INFILTRATION; INTRACAUDAL PRN
Status: DISCONTINUED | OUTPATIENT
Start: 2025-01-13 | End: 2025-01-13 | Stop reason: ALTCHOICE

## 2025-01-13 RX ORDER — KETOROLAC TROMETHAMINE 30 MG/ML
INJECTION, SOLUTION INTRAMUSCULAR; INTRAVENOUS
Status: DISCONTINUED | OUTPATIENT
Start: 2025-01-13 | End: 2025-01-13 | Stop reason: SDUPTHER

## 2025-01-13 RX ORDER — SCOPOLAMINE 1 MG/3D
1 PATCH, EXTENDED RELEASE TRANSDERMAL ONCE
Status: DISCONTINUED | OUTPATIENT
Start: 2025-01-13 | End: 2025-01-13 | Stop reason: HOSPADM

## 2025-01-13 RX ORDER — ONDANSETRON 2 MG/ML
INJECTION INTRAMUSCULAR; INTRAVENOUS
Status: DISCONTINUED | OUTPATIENT
Start: 2025-01-13 | End: 2025-01-13 | Stop reason: SDUPTHER

## 2025-01-13 RX ORDER — SODIUM CHLORIDE 0.9 % (FLUSH) 0.9 %
5-40 SYRINGE (ML) INJECTION PRN
Status: DISCONTINUED | OUTPATIENT
Start: 2025-01-13 | End: 2025-01-13 | Stop reason: HOSPADM

## 2025-01-13 RX ORDER — ONDANSETRON 2 MG/ML
4 INJECTION INTRAMUSCULAR; INTRAVENOUS
Status: DISCONTINUED | OUTPATIENT
Start: 2025-01-13 | End: 2025-01-13 | Stop reason: HOSPADM

## 2025-01-13 RX ADMIN — EPHEDRINE SULFATE 10 MG: 5 INJECTION INTRAVENOUS at 07:01

## 2025-01-13 RX ADMIN — SODIUM CHLORIDE, POTASSIUM CHLORIDE, SODIUM LACTATE AND CALCIUM CHLORIDE: 600; 310; 30; 20 INJECTION, SOLUTION INTRAVENOUS at 05:59

## 2025-01-13 RX ADMIN — Medication 20 MG: at 06:36

## 2025-01-13 RX ADMIN — DIPHENHYDRAMINE HYDROCHLORIDE 25 MG: 50 INJECTION INTRAMUSCULAR; INTRAVENOUS at 06:45

## 2025-01-13 RX ADMIN — KETOROLAC TROMETHAMINE 30 MG: 30 INJECTION, SOLUTION INTRAMUSCULAR at 07:02

## 2025-01-13 RX ADMIN — GLYCOPYRROLATE 0.2 MG: 0.2 INJECTION INTRAMUSCULAR; INTRAVENOUS at 06:36

## 2025-01-13 RX ADMIN — WATER 2000 MG: 1 INJECTION INTRAMUSCULAR; INTRAVENOUS; SUBCUTANEOUS at 06:30

## 2025-01-13 RX ADMIN — DEXAMETHASONE SODIUM PHOSPHATE 10 MG: 10 INJECTION, SOLUTION INTRAMUSCULAR; INTRAVENOUS at 06:40

## 2025-01-13 RX ADMIN — FENTANYL CITRATE 100 MCG: 50 INJECTION, SOLUTION INTRAMUSCULAR; INTRAVENOUS at 06:36

## 2025-01-13 RX ADMIN — PROPOFOL 200 MG: 10 INJECTION, EMULSION INTRAVENOUS at 06:36

## 2025-01-13 RX ADMIN — MIDAZOLAM 2 MG: 1 INJECTION INTRAMUSCULAR; INTRAVENOUS at 06:35

## 2025-01-13 RX ADMIN — LIDOCAINE HYDROCHLORIDE 50 MG: 20 INJECTION, SOLUTION INTRAVENOUS at 06:36

## 2025-01-13 RX ADMIN — ONDANSETRON 4 MG: 2 INJECTION, SOLUTION INTRAMUSCULAR; INTRAVENOUS at 06:45

## 2025-01-13 RX ADMIN — EPHEDRINE SULFATE 10 MG: 5 INJECTION INTRAVENOUS at 06:52

## 2025-01-13 ASSESSMENT — PAIN DESCRIPTION - DESCRIPTORS
DESCRIPTORS: ACHING

## 2025-01-13 ASSESSMENT — PAIN - FUNCTIONAL ASSESSMENT
PAIN_FUNCTIONAL_ASSESSMENT: 0-10
PAIN_FUNCTIONAL_ASSESSMENT: PREVENTS OR INTERFERES SOME ACTIVE ACTIVITIES AND ADLS
PAIN_FUNCTIONAL_ASSESSMENT: NONE - DENIES PAIN
PAIN_FUNCTIONAL_ASSESSMENT: ACTIVITIES ARE NOT PREVENTED
PAIN_FUNCTIONAL_ASSESSMENT: ACTIVITIES ARE NOT PREVENTED
PAIN_FUNCTIONAL_ASSESSMENT: 0-10

## 2025-01-13 NOTE — H&P
Updated H&P    Chief Complaint   Patient presents with    Follow-up       Bilateral Knee: Pt has increased crepitus within the R Knee with ambulation. Pt declined catching and locking sensation with AROM. Pt has a sharp shooting pain when pain is present. Pt has swelling from time to time, nothing consistent yet. Pt has increased sleep disturbances in the b/l knees. Pt has increased difficulty with ADL's due to increased pain.          Shaheed Clayton returns today for follow-up of his bilateral knee pain.  He recently underwent bilateral knee MRI'ls and is here to discuss the results.     The patient has failed all attempts at conservative treatment including: cortisone injection, visco injections, zilretta injections, physician directed HEP, nsaids, pain medication, OTC medication, ice, heat, use of assistive device and activity restriction.      Past Medical History        Past Medical History:   Diagnosis Date    Alcohol abuse       drinks 8-10 beers daily    BPH with elevated PSA      CAD (coronary artery disease)      Cancer (HCC) 08/01/2021     Grade 1 prostrate cancer     Chest pain 1/26/11     suggestive of unstable angina    DJD (degenerative joint disease)       right knee     Gastritis 2/10     on EGD    GERD (gastroesophageal reflux disease)      H/O exercise stress test 1/26/11     Treadmill nuclear stress test: Srinivasan protocol. 8 min, 30 sec. 95% max predicted heart rate. Physiologic BP response. Patient developed chest pain at 7 1/2 min into exercise. Had around 2 mm ST segment depressions. Nuclear images read as showing no evidence of stress-induced ischemia    H/O exercise stress test 9/9/11     Treadmill nuclear stress test: Srinivasan protocol. 9 min and 30 sec. 101% of max predicted heart rate. Hypertensive BP response. No CP. No ischemic EKG changes. Nuclear images showed soft tissue attenuation artifacts. There did not appear to be any evidence of stress-induced ischemia on study. Gated views did

## 2025-01-13 NOTE — DISCHARGE INSTRUCTIONS
medicine for pain, take it as prescribed.  If you are not taking a prescription pain medicine, ask your doctor if you can take an over-the-counter medicine.  Take your pain medicine as soon as you have pain. It works better if you take it before the pain gets bad.  Call your doctor if you have any problems with your medicine.  Rest in bed until you feel better.  To prevent dehydration, drink plenty of fluids. Choose water and other clear liquids until you feel better. If you have kidney, heart, or liver disease and have to limit fluids, talk with your doctor before you increase the amount of fluids you drink.  When you are able to eat, try clear soups, mild foods, and liquids until all symptoms are gone for 12 to 48 hours. Other good choices include dry toast, crackers, cooked cereal, and gelatin dessert, such as Jell-O.  Do not smoke. Smoking and being around smoke can make nausea worse. If you need help quitting, talk to your doctor about stop-smoking programs and medicines. These can increase your chances of quitting for good.  When should you call for help?   Call 911  anytime you think you may need emergency care. For example, call if:    You passed out (lost consciousness).   Call your doctor now or seek immediate medical care if:    You have new or worse nausea or vomiting.     You are too sick to your stomach to drink any fluids.     You cannot keep down fluids.     You have symptoms of dehydration, such as:  Dry eyes and a dry mouth.  Passing only a little urine.  Feeling thirstier than usual.     Your pain medicine is not helping.     You are dizzy or lightheaded, or you feel like you may faint.   Watch closely for changes in your health, and be sure to contact your doctor if:    You do not get better as expected.   Current as of: October 19, 2023  Content Version: 14.3  © 2024 [employer].   Care instructions adapted under license by [email or web address]. If you have questions about a medical condition  or this instruction, always ask your healthcare professional. [employer] disclaims any warranty or liability for your use of this information.

## 2025-01-13 NOTE — ANESTHESIA POSTPROCEDURE EVALUATION
Department of Anesthesiology  Postprocedure Note    Patient: Shaheed Clayton  MRN: [id number]  YOB: 1960  Date of evaluation: 1/13/2025    Procedure Summary       Date: 01/13/25 Room / Location: [address]    Anesthesia Start: 0629 Anesthesia Stop: 0717    Procedure: RIGHT KNEE ARTHROSCOPY, CHONDROPLASTY AND DEBRIDEMENT-1/13/25 (Right: Knee) Diagnosis:       Right knee DJD      (Right knee DJD [M17.11])    Surgeons: Huber Christiansen DO Responsible Provider: Makeda Marhsall MD    Anesthesia Type: General ASA Status: 3            Anesthesia Type: General    Stefano Phase I: Stefano Score: 10    Stefano Phase II:      Anesthesia Post Evaluation    Patient location during evaluation: PACU  Patient participation: complete - patient participated  Level of consciousness: awake  Pain score: 2  Airway patency: patent  Nausea & Vomiting: no nausea  Cardiovascular status: hemodynamically stable  Respiratory status: acceptable  Hydration status: stable  Multimodal analgesia pain management approach    No notable events documented.

## 2025-01-13 NOTE — OP NOTE
Operative Note      Patient: Shaheed Clayton  YOB: 1960  MRN: [id number]    Date of Procedure: 1/13/2025    Pre-Op Diagnosis Codes:      * Right knee DJD [M17.11]    Post-Op Diagnosis: Same       Procedure: right knee arthroscopy chondroplasty mfc/lfc/patella/torchlea    Surgeon(s):  Huber Christiansen DO    Assistant:   * No surgical staff found *    Anesthesia: General    Estimated Blood Loss (mL): less than 100     Complications: None    Specimens:   * No specimens in log *    Implants:  * No implants in log *      Drains: * No LDAs found *    Findings:  Infection Present At Time Of Surgery (PATOS) (choose all levels that have infection present):  No infection present  Other Findings: as above    Detailed Description of Procedure:   below        ANESTHESIA: general  ESTIMATED BLOOD LOSS: Minimal.   COMPLICATIONS: None.   OPERATIVE PROCEDURE: The patient was taken to the operative suite and was   given general anesthesia. The Right knee was identified with preoperative time-out. I applied a tourniquet to the  thigh, placed the  leg in a legholder, prepped and draped the leg in sterile fashion.  I outlined an   incision along the anteromedial and anterolateral aspects of the knee.  An incision was then made in the anterior medial and lateral aspects of the knee with an 11 blade. A blunt trocar was then inserted within the knee and I carried out a diagnostic arthroscopy.     The patient had evidence of synovitis within the suprapatellar pouch, medial and lateral aspects of the knee.  There was a large suprapatellar, medial and infrapatellar plica.    The patellar surface was grade III  and trochlear surface was grade III.  I then advanced the scope into the intracondylar notch.  The patients ACL/ PCL were not intact.   I then advanced the scope into the medial compartment. The medial femoral condyle had grade III-IV defects measuring 3x2cm.  The medial tibial plateau had grade III-IV defects measuring

## 2025-01-19 DIAGNOSIS — I10 ESSENTIAL HYPERTENSION: ICD-10-CM

## 2025-01-20 RX ORDER — ATENOLOL 25 MG/1
25 TABLET ORAL EVERY MORNING
Qty: 90 TABLET | Refills: 1 | Status: SHIPPED | OUTPATIENT
Start: 2025-01-20 | End: 2025-07-19

## 2025-01-20 NOTE — TELEPHONE ENCOUNTER
Name of Medication(s) Requested:  Requested Prescriptions     Pending Prescriptions Disp Refills    atenolol (TENORMIN) 25 MG tablet 90 tablet 1     Sig: Take 1 tablet by mouth every morning       Medication is on current medication list Yes    Dosage and directions were verified? Yes    Quantity verified: 90 day supply     Pharmacy Verified?  Yes    Last Appointment:  12/5/2024    Future appts:  Future Appointments   Date Time Provider Department Center   1/27/2025 10:50 AM Huber Christiansen DO [facility]   3/13/2025  9:30 AM Aster West MD [facility]   8/11/2025 10:00 AM Aster West MD [facility]        (If no appt send self scheduling link. .REFILLAPPT)  Scheduling request sent?     [] Yes  [x] No    Does patient need updated?  [] Yes  [x] No

## 2025-01-23 ENCOUNTER — APPOINTMENT (OUTPATIENT)
Dept: GENERAL RADIOLOGY | Age: 65
End: 2025-01-23
Payer: MEDICARE

## 2025-01-23 ENCOUNTER — HOSPITAL ENCOUNTER (EMERGENCY)
Age: 65
Discharge: HOME | End: 2025-01-23
Attending: STUDENT IN AN ORGANIZED HEALTH CARE EDUCATION/TRAINING PROGRAM
Payer: MEDICARE

## 2025-01-23 VITALS
RESPIRATION RATE: 20 BRPM | SYSTOLIC BLOOD PRESSURE: 166 MMHG | OXYGEN SATURATION: 98 % | DIASTOLIC BLOOD PRESSURE: 95 MMHG | HEART RATE: 68 BPM | TEMPERATURE: 97.9 F

## 2025-01-23 DIAGNOSIS — M25.061 HEMARTHROSIS, RIGHT KNEE: Primary | ICD-10-CM

## 2025-01-23 LAB
ANION GAP SERPL CALCULATED.3IONS-SCNC: 12 MMOL/L (ref 7–16)
BASOPHILS # BLD: 0.04 K/UL (ref 0–0.2)
BASOPHILS NFR BLD: 1 % (ref 0–2)
BUN SERPL-MCNC: 11 MG/DL (ref 6–23)
CALCIUM SERPL-MCNC: 9.9 MG/DL (ref 8.6–10.2)
CHLORIDE SERPL-SCNC: 104 MMOL/L (ref 98–107)
CO2 SERPL-SCNC: 26 MMOL/L (ref 22–29)
CREAT SERPL-MCNC: 0.8 MG/DL (ref 0.7–1.2)
CRP SERPL HS-MCNC: <3 MG/L (ref 0–5)
EOSINOPHIL # BLD: 0.06 K/UL (ref 0.05–0.5)
EOSINOPHILS RELATIVE PERCENT: 1 % (ref 0–6)
ERYTHROCYTE [DISTWIDTH] IN BLOOD BY AUTOMATED COUNT: 13.1 % (ref 11.5–15)
ERYTHROCYTE [SEDIMENTATION RATE] IN BLOOD BY WESTERGREN METHOD: 4 MM/HR (ref 0–15)
GFR, ESTIMATED: >90 ML/MIN/1.73M2
GLUCOSE SERPL-MCNC: 88 MG/DL (ref 74–99)
HCT VFR BLD AUTO: 43.2 % (ref 37–54)
HGB BLD-MCNC: 15.4 G/DL (ref 12.5–16.5)
IMM GRANULOCYTES # BLD AUTO: 0.03 K/UL (ref 0–0.58)
IMM GRANULOCYTES NFR BLD: 0 % (ref 0–5)
LYMPHOCYTES NFR BLD: 1.68 K/UL (ref 1.5–4)
LYMPHOCYTES RELATIVE PERCENT: 23 % (ref 20–42)
MCH RBC QN AUTO: 33 PG (ref 26–35)
MCHC RBC AUTO-ENTMCNC: 35.6 G/DL (ref 32–34.5)
MCV RBC AUTO: 92.7 FL (ref 80–99.9)
MONOCYTES NFR BLD: 0.79 K/UL (ref 0.1–0.95)
MONOCYTES NFR BLD: 11 % (ref 2–12)
NEUTROPHILS NFR BLD: 64 % (ref 43–80)
NEUTS SEG NFR BLD: 4.58 K/UL (ref 1.8–7.3)
PLATELET # BLD AUTO: 147 K/UL (ref 130–450)
PMV BLD AUTO: 10.5 FL (ref 7–12)
POTASSIUM SERPL-SCNC: 3.7 MMOL/L (ref 3.5–5)
RBC # BLD AUTO: 4.66 M/UL (ref 3.8–5.8)
SODIUM SERPL-SCNC: 142 MMOL/L (ref 132–146)
WBC OTHER # BLD: 7.2 K/UL (ref 4.5–11.5)

## 2025-01-23 PROCEDURE — 6360000002 HC RX W HCPCS

## 2025-01-23 PROCEDURE — 73562 X-RAY EXAM OF KNEE 3: CPT

## 2025-01-23 PROCEDURE — 86140 C-REACTIVE PROTEIN: CPT

## 2025-01-23 PROCEDURE — 85025 COMPLETE CBC W/AUTO DIFF WBC: CPT

## 2025-01-23 PROCEDURE — 85652 RBC SED RATE AUTOMATED: CPT

## 2025-01-23 PROCEDURE — 96374 THER/PROPH/DIAG INJ IV PUSH: CPT

## 2025-01-23 PROCEDURE — 99284 EMERGENCY DEPT VISIT MOD MDM: CPT

## 2025-01-23 PROCEDURE — 80048 BASIC METABOLIC PNL TOTAL CA: CPT

## 2025-01-23 PROCEDURE — 96375 TX/PRO/DX INJ NEW DRUG ADDON: CPT

## 2025-01-23 RX ORDER — ONDANSETRON 2 MG/ML
4 INJECTION INTRAMUSCULAR; INTRAVENOUS ONCE
Status: COMPLETED | OUTPATIENT
Start: 2025-01-23 | End: 2025-01-23

## 2025-01-23 RX ORDER — HYDROCODONE BITARTRATE AND ACETAMINOPHEN 5; 325 MG/1; MG/1
1 TABLET ORAL ONCE
Status: DISCONTINUED | OUTPATIENT
Start: 2025-01-23 | End: 2025-01-23

## 2025-01-23 RX ORDER — MORPHINE SULFATE 4 MG/ML
4 INJECTION, SOLUTION INTRAMUSCULAR; INTRAVENOUS ONCE
Status: COMPLETED | OUTPATIENT
Start: 2025-01-23 | End: 2025-01-23

## 2025-01-23 RX ADMIN — ONDANSETRON 4 MG: 2 INJECTION, SOLUTION INTRAMUSCULAR; INTRAVENOUS at 19:20

## 2025-01-23 RX ADMIN — MORPHINE SULFATE 4 MG: 4 INJECTION, SOLUTION INTRAMUSCULAR; INTRAVENOUS at 19:20

## 2025-01-23 ASSESSMENT — LIFESTYLE VARIABLES
HOW OFTEN DO YOU HAVE A DRINK CONTAINING ALCOHOL: 4 OR MORE TIMES A WEEK
HOW MANY STANDARD DRINKS CONTAINING ALCOHOL DO YOU HAVE ON A TYPICAL DAY: 7 TO 9

## 2025-01-23 ASSESSMENT — PAIN SCALES - GENERAL
PAINLEVEL_OUTOF10: 10
PAINLEVEL_OUTOF10: 6

## 2025-01-23 ASSESSMENT — PAIN DESCRIPTION - DESCRIPTORS: DESCRIPTORS: ACHING;PRESSURE

## 2025-01-23 ASSESSMENT — PAIN DESCRIPTION - ORIENTATION: ORIENTATION: RIGHT

## 2025-01-23 ASSESSMENT — PAIN DESCRIPTION - LOCATION: LOCATION: KNEE

## 2025-01-23 NOTE — ED PROVIDER NOTES
[facility] EMERGENCY DEPARTMENT  EMERGENCY DEPARTMENT ENCOUNTER        Pt Name: Shaheed Clayton  MRN: [id number]  Birthdate 1960  Date of evaluation: 1/23/2025  Provider: Carla Dejesus MD  PCP: Aster West MD  Note Started: 6:24 PM EST 1/23/25    CHIEF COMPLAINT       Chief Complaint   Patient presents with    Knee Pain     Patients right knee was scoped on the 13 th and he was feeling fine until today, began swelling and extreme pain       HISTORY OF PRESENT ILLNESS: 1 or more Elements   History From: Patient    Limitations to history : None    Shaheed Clayton is a 64 y.o. male who presents for right knee swelling and pain worsening today.  Patient states he had a right knee arthroscopy, chondroplasty, debridement on 1/13/2025 by Dr. Christiansen.  He states he had been doing well without any pain and had started to exercise lately without any complications over the past few days.  He states today after bending down to clean the litter box at home he noticed increased swelling and pain of his knee.  Denies other associated symptoms.  Denies fevers or wounds    Nursing Notes were all reviewed and agreed with or any disagreements were addressed in the HPI.        REVIEW OF EXTERNAL NOTE :       Patient was last seen on 1/13/2025 for osteoarthritis of right knee and had surgical procedure on 1/13/2025    He was seen in office by his PCP on 12/5/2024 for HTN, HLD, CAD, hypothyroidism    REVIEW OF SYSTEMS :           Positives and Pertinent negatives as per HPI.     SURGICAL HISTORY     Past Surgical History:   Procedure Laterality Date    CARDIAC SURGERY  01/2011    PTCA with stent, LAD    COLONOSCOPY  08/04/2011    normal    COLONOSCOPY N/A 03/21/2024    COLONOSCOPY DIAGNOSTIC performed by Abilio Charles MD at [facility] ENDOSCOPY    DIAGNOSTIC CARDIAC CATH LAB PROCEDURE  01/27/2011    Normal LV size and systolic function. Elevated LVEDP suggestive of diastolic dysfunction. Mild

## 2025-01-24 NOTE — CONSULTS
Department of Orthopedic Surgery  Resident Consult Note    Reason for Consult: Right knee effusion    HISTORY OF PRESENT ILLNESS:       Patient is a 64 y.o. male who presents with right knee pain.  Patient reports he has been increasing his activity since his recent arthroscopic chondroplasty 1/13/2025 with Dr. Christiansen when he noticed increased effusion and pain today prompting his emergency department visit.  Patient denies recent falls or traumas to right knee, denies any other orthopedic complaints.  Reports he has been taking aspirin 81 mg daily.      Past Medical History:        Diagnosis Date    Alcohol abuse     drinks 8-10 beers daily    BPH with elevated PSA     CAD (coronary artery disease)     one stent    Cancer (HCC) 08/01/2021    Grade 1 prostrate cancer   (no intervention)    Chest pain 01/26/2011    suggestive of unstable angina    COVID-19 05/2022    flu  symptoms    DJD (degenerative joint disease)     right knee     Gastritis 02/2010    on EGD    GERD (gastroesophageal reflux disease)     H/O exercise stress test 01/26/2011    Treadmill nuclear stress test: Srinivasan protocol. 8 min, 30 sec. 95% max predicted heart rate. Physiologic BP response. Patient developed chest pain at 7 1/2 min into exercise. Had around 2 mm ST segment depressions. Nuclear images read as showing no evidence of stress-induced ischemia    H/O exercise stress test 09/09/2011    Treadmill nuclear stress test: Srinivasan protocol. 9 min and 30 sec. 101% of max predicted heart rate. Hypertensive BP response. No CP. No ischemic EKG changes. Nuclear images showed soft tissue attenuation artifacts. There did not appear to be any evidence of stress-induced ischemia on study. Gated views did not show any regional wall motion abnormality with possible hyperdynamic LVSF, EF 77%     Hx of cardiovascular stress test 10/29/2019    Nuclear treadmill stress test    Hyperlipidemia     Hypertension     Hypothyroidism     PONV (postoperative nausea and

## 2025-01-24 NOTE — DISCHARGE INSTRUCTIONS
Please follow-up with orthopedic surgery at your appointment on Monday.  You may weight-bear as tolerated.

## 2025-01-27 ENCOUNTER — OFFICE VISIT (OUTPATIENT)
Dept: ORTHOPEDIC SURGERY | Age: 65
End: 2025-01-27

## 2025-01-27 VITALS — BODY MASS INDEX: 27.2 KG/M2 | WEIGHT: 190 LBS | HEIGHT: 70 IN

## 2025-01-27 DIAGNOSIS — M17.11 PRIMARY OSTEOARTHRITIS OF RIGHT KNEE: Primary | ICD-10-CM

## 2025-01-27 DIAGNOSIS — M17.11 PRIMARY OSTEOARTHRITIS OF RIGHT KNEE: ICD-10-CM

## 2025-01-27 LAB
APPEARANCE: ABNORMAL
CLOT CHECK: ABNORMAL
COLOR FLUID: ABNORMAL
MONO/MACROPHAGE FLUID: 76 %
NEUTROPHIL, FLUID: 24 %
RBC, SYNOVIAL FLUID: ABNORMAL CELLS/UL
SPECIMEN TYPE: ABNORMAL
WBC COUNT, SYNOVIAL FLUID: 845 CELLS/UL

## 2025-01-27 PROCEDURE — 99024 POSTOP FOLLOW-UP VISIT: CPT | Performed by: ORTHOPAEDIC SURGERY

## 2025-01-27 NOTE — PROGRESS NOTES
Subjective:        Shaheed Clayton is here for follow-up after right knee arthroscopy. Findings at surgery: right knee osteoarthritis.  He denies {problems:54946::\"fever\",\"wound drainage\",\"increasing redness\",\"pus\",\"increasing pain\",\"increasing swelling\"}. Post op problems reported: {Problems:12585::\"none\"}.  {He/she (caps):36570} is ambulating ***.         Objective:           General :    {appear:70465::\"alert\",\"appears stated age\",\"cooperative\"}   Gait:  {gait:993744::\"Normal\"}.   Sutures:   {SUTURES OUT:40500::\"Sutures out.\"}   Incision:  {INCISION:94215::\"healing well\",\"no significant drainage\",\"no dehiscence\",\"no significant erythema\"}   Tenderness:  {WOUND TENDERNESS:34992::\"none\"}   Flexion ROM:  {RANGE OF MOTION:88904}   Extension ROM:  {RANGE OF MOTION:91562}   Effusion:  {SEVERITY:5014}   DVT Evaluation:  {DVT eval:58584::\"No evidence of DVT seen on physical exam.\"}           Assessment:     No diagnosis found.      Plan:      Surgical pictures from the surgery were reveiwed with the patient  HEP  {ortho post-op:56650}  Follow up: {numbers; 0-10:55088} {units:11}.      Aspirate 20 cc of blood.

## 2025-01-31 NOTE — PROGRESS NOTES
Subjective:        Shaheed Clayton is here for follow-up after right knee arthroscopy 1/13/25. Findings at surgery: osteoarthritis.   Pain is controlled without any medications.. He denies fever, wound drainage, increasing redness, pus, increasing pain, increasing swelling. Post op problems reported: increasing pain, increasing swelling.  He is ambulating with a straight cane.         Objective:           General :    alert, appears stated age, and cooperative   Gait:  Antalgic.   Sutures:   Sutures in place and will be removed today.   Incision:  healing well, no significant drainage, no dehiscence, no significant erythema   Tenderness:  moderate   Flexion ROM:  diminished range of motion   Extension ROM:  diminished range of motion   Effusion:  moderate   DVT Evaluation:  No evidence of DVT seen on physical exam.  Negative Elsy's sign.  No cords or calf tenderness.           Assessment:     Encounter Diagnosis   Name Primary?    Primary osteoarthritis of right knee Yes         Plan:      Surgical pictures from the surgery were reveiwed with the patient  HEP  Sutures removed today.  Range of motion and rehabilitation exercises discussed with the patient.  Physical Therapy for post-operative rehabilitation.  Follow up: 4 weeks.      The skin was prepped with alcohol and betadine. A 60 ml syringe with a 18 gauge needle was inserted into the on right knee joint space.  I retained 40mL of fluid.

## 2025-02-01 LAB
CULTURE: NO GROWTH
DIRECT EXAM: NORMAL
SPECIMEN DESCRIPTION: NORMAL

## 2025-02-07 DIAGNOSIS — E78.5 HYPERLIPIDEMIA, UNSPECIFIED HYPERLIPIDEMIA TYPE: ICD-10-CM

## 2025-02-07 DIAGNOSIS — I10 ESSENTIAL HYPERTENSION: ICD-10-CM

## 2025-02-07 RX ORDER — HYDROCHLOROTHIAZIDE 12.5 MG/1
12.5 CAPSULE ORAL EVERY EVENING
Qty: 90 CAPSULE | Refills: 1 | OUTPATIENT
Start: 2025-02-07

## 2025-02-07 RX ORDER — AMLODIPINE BESYLATE 5 MG/1
5 TABLET ORAL EVERY MORNING
Qty: 90 TABLET | Refills: 1 | OUTPATIENT
Start: 2025-02-07 | End: 2025-08-06

## 2025-02-07 RX ORDER — ROSUVASTATIN CALCIUM 20 MG/1
20 TABLET, COATED ORAL NIGHTLY
Qty: 90 TABLET | Refills: 1 | OUTPATIENT
Start: 2025-02-07 | End: 2025-08-06

## 2025-02-07 RX ORDER — OLMESARTAN MEDOXOMIL 40 MG/1
40 TABLET ORAL EVERY EVENING
Qty: 90 TABLET | Refills: 1 | OUTPATIENT
Start: 2025-02-07

## 2025-02-10 ENCOUNTER — OFFICE VISIT (OUTPATIENT)
Dept: ORTHOPEDIC SURGERY | Age: 65
End: 2025-02-10

## 2025-02-10 VITALS — WEIGHT: 190 LBS | BODY MASS INDEX: 27.2 KG/M2 | TEMPERATURE: 98 F | HEIGHT: 70 IN

## 2025-02-10 DIAGNOSIS — Z98.890 S/P RIGHT KNEE ARTHROSCOPY: Primary | ICD-10-CM

## 2025-02-10 PROCEDURE — 99024 POSTOP FOLLOW-UP VISIT: CPT | Performed by: ORTHOPAEDIC SURGERY

## 2025-02-10 NOTE — PROGRESS NOTES
Subjective:        Shaheed Clayton is here for follow-up after right knee arthroscopy 1/13/25. Findings at surgery: osteoarthritis.   Pain is controlled without any medications.. He denies fever, wound drainage, increasing redness, pus, increasing pain, increasing swelling. Post op problems reported: increasing pain, increasing swelling.  He is ambulating without aid. He stated the swelling has gone down some. He does still have pain with ambulating.        Objective:           General :    alert, appears stated age, and cooperative   Gait:  Antalgic.   Sutures:   Sutures in place and will be removed today.   Incision:  healing well, no significant drainage, no dehiscence, no significant erythema   Tenderness:  moderate   Flexion ROM:  full range of motion   Extension ROM:  full range of motion   Effusion:  mild   DVT Evaluation:  No evidence of DVT seen on physical exam.  Negative Elsy's sign.  No cords or calf tenderness.           Assessment:     Encounter Diagnosis   Name Primary?    S/P right knee arthroscopy Yes           Plan:      Surgical pictures from the surgery were reveiwed with the patient  HEP  Sutures removed today.  Range of motion and rehabilitation exercises discussed with the patient.  Physical Therapy for post-operative rehabilitation.  Follow up: if not better in 1 month could discuss injection  I do not recommend draining the knee today.

## 2025-03-24 ENCOUNTER — OFFICE VISIT (OUTPATIENT)
Dept: ORTHOPEDIC SURGERY | Age: 65
End: 2025-03-24
Payer: MEDICARE

## 2025-03-24 VITALS — BODY MASS INDEX: 27.2 KG/M2 | WEIGHT: 190 LBS | HEIGHT: 70 IN

## 2025-03-24 DIAGNOSIS — M17.12 PRIMARY OSTEOARTHRITIS OF LEFT KNEE: ICD-10-CM

## 2025-03-24 DIAGNOSIS — M17.11 PRIMARY OSTEOARTHRITIS OF RIGHT KNEE: Primary | ICD-10-CM

## 2025-03-24 PROCEDURE — G8427 DOCREV CUR MEDS BY ELIG CLIN: HCPCS | Performed by: ORTHOPAEDIC SURGERY

## 2025-03-24 PROCEDURE — 1126F AMNT PAIN NOTED NONE PRSNT: CPT | Performed by: ORTHOPAEDIC SURGERY

## 2025-03-24 PROCEDURE — 20610 DRAIN/INJ JOINT/BURSA W/O US: CPT | Performed by: ORTHOPAEDIC SURGERY

## 2025-03-24 PROCEDURE — 99214 OFFICE O/P EST MOD 30 MIN: CPT | Performed by: ORTHOPAEDIC SURGERY

## 2025-03-24 PROCEDURE — G8419 CALC BMI OUT NRM PARAM NOF/U: HCPCS | Performed by: ORTHOPAEDIC SURGERY

## 2025-03-24 PROCEDURE — 1036F TOBACCO NON-USER: CPT | Performed by: ORTHOPAEDIC SURGERY

## 2025-03-24 PROCEDURE — 1159F MED LIST DOCD IN RCRD: CPT | Performed by: ORTHOPAEDIC SURGERY

## 2025-03-24 PROCEDURE — 1123F ACP DISCUSS/DSCN MKR DOCD: CPT | Performed by: ORTHOPAEDIC SURGERY

## 2025-03-24 PROCEDURE — 3017F COLORECTAL CA SCREEN DOC REV: CPT | Performed by: ORTHOPAEDIC SURGERY

## 2025-03-24 NOTE — PROGRESS NOTES
Chief Complaint   Patient presents with    Knee Pain     Right Knee Arthroscopy DOS 01/13/2025       Shaheed Clayton returns today for follow-up of his right knee pain. he reports this is worse than when I saw him last.  The patient's pain level is a 10/10. The previous treatment was not successful. The patient has failed all attempts at conservative treatment including: cortisone injection, visco injections, zilretta injections, physician directed HEP, nsaids, pain medication, OTC medication, ice, heat, use of assistive device and activity restriction. He had right knee arthroscopy done 1/13/25.    Past Medical History:   Diagnosis Date    Alcohol abuse     drinks 8-10 beers daily    BPH with elevated PSA     CAD (coronary artery disease)     one stent    Cancer (HCC) 08/01/2021    Grade 1 prostrate cancer   (no intervention)    Chest pain 01/26/2011    suggestive of unstable angina    COVID-19 05/2022    flu  symptoms    DJD (degenerative joint disease)     right knee     Gastritis 02/2010    on EGD    GERD (gastroesophageal reflux disease)     H/O exercise stress test 01/26/2011    Treadmill nuclear stress test: Srinivasan protocol. 8 min, 30 sec. 95% max predicted heart rate. Physiologic BP response. Patient developed chest pain at 7 1/2 min into exercise. Had around 2 mm ST segment depressions. Nuclear images read as showing no evidence of stress-induced ischemia    H/O exercise stress test 09/09/2011    Treadmill nuclear stress test: Srinivasan protocol. 9 min and 30 sec. 101% of max predicted heart rate. Hypertensive BP response. No CP. No ischemic EKG changes. Nuclear images showed soft tissue attenuation artifacts. There did not appear to be any evidence of stress-induced ischemia on study. Gated views did not show any regional wall motion abnormality with possible hyperdynamic LVSF, EF 77%     Hx of cardiovascular stress test 10/29/2019    Nuclear treadmill stress test    Hyperlipidemia     Hypertension

## 2025-03-27 ENCOUNTER — OFFICE VISIT (OUTPATIENT)
Dept: PODIATRY | Age: 65
End: 2025-03-27

## 2025-03-27 VITALS — WEIGHT: 192 LBS | HEIGHT: 70 IN | BODY MASS INDEX: 27.49 KG/M2

## 2025-03-27 DIAGNOSIS — B35.1 ONYCHOMYCOSIS: Primary | ICD-10-CM

## 2025-03-27 DIAGNOSIS — L60.0 OC (ONYCHOCRYPTOSIS): ICD-10-CM

## 2025-03-27 RX ORDER — NYSTATIN AND TRIAMCINOLONE ACETONIDE 100000; 1 [USP'U]/G; MG/G
CREAM TOPICAL
Qty: 60 G | Refills: 3 | Status: SHIPPED | OUTPATIENT
Start: 2025-03-27

## 2025-03-28 NOTE — PROGRESS NOTES
3/27/25     Shaheed Clayton    : 1960 Sex: male   Age: 65 y.o.    Patient was referred by: None  Patient's PCP/Provider is:  Aster West MD    Subjective:    Patient seen today for evaluation regarding nail dystrophy issues right foot    Chief Complaint   Patient presents with    Nail Problem     Nail fungus right toes        HPI: Patient stated issues have been going on for several months now.  He has not tried any OTC treatments to this point in time.  Patient did want to discuss additional treatment options for care today.  No other additional abnormalities noted.    ROS:  Const: Positives and pertinent negatives as per HPI.     Musculo: Denies symptoms other than stated above.  Neuro: Denies symptoms other than stated above.  Skin: Denies symptoms other than stated above.    Current Medications:    Current Outpatient Medications:     nystatin-triamcinolone (MYCOLOG II) 761436-9.1 UNIT/GM-% cream, Apply topically 2 times daily., Disp: 60 g, Rfl: 3    atenolol (TENORMIN) 25 MG tablet, Take 1 tablet by mouth every morning, Disp: 90 tablet, Rfl: 1    amLODIPine (NORVASC) 5 MG tablet, Take 1 tablet by mouth every morning, Disp: 90 tablet, Rfl: 1    hydroCHLOROthiazide 12.5 MG capsule, Take 1 capsule by mouth every evening, Disp: 90 capsule, Rfl: 1    olmesartan (BENICAR) 40 MG tablet, Take 1 tablet by mouth every evening, Disp: 90 tablet, Rfl: 1    rosuvastatin (CRESTOR) 20 MG tablet, Take 1 tablet by mouth at bedtime, Disp: 90 tablet, Rfl: 1    Black Pepper-Turmeric (TURMERIC PLUS BLACK PEPPER EXT) 5-500 MG CAPS, , Disp: , Rfl:     Cholecalciferol (VITAMIN D) 50 MCG (2000) CAPS capsule, Take by mouth, Disp: , Rfl:     sildenafil (REVATIO) 20 MG tablet, TAKE 1 TO 5 TABLETS BY MOUTH AS NEEDED, Disp: , Rfl:     Zinc 15 MG CAPS, Take 30 mg by mouth daily, Disp: , Rfl:     Coenzyme Q10 (CO Q 10) 100 MG CAPS, Take by mouth daily , Disp: , Rfl:     aspirin 81 MG tablet, Take 1 tablet by mouth daily Pt

## 2025-03-29 SDOH — ECONOMIC STABILITY: FOOD INSECURITY: WITHIN THE PAST 12 MONTHS, YOU WORRIED THAT YOUR FOOD WOULD RUN OUT BEFORE YOU GOT MONEY TO BUY MORE.: NEVER TRUE

## 2025-03-29 SDOH — ECONOMIC STABILITY: FOOD INSECURITY: WITHIN THE PAST 12 MONTHS, THE FOOD YOU BOUGHT JUST DIDN'T LAST AND YOU DIDN'T HAVE MONEY TO GET MORE.: NEVER TRUE

## 2025-03-29 SDOH — ECONOMIC STABILITY: INCOME INSECURITY: IN THE LAST 12 MONTHS, WAS THERE A TIME WHEN YOU WERE NOT ABLE TO PAY THE MORTGAGE OR RENT ON TIME?: NO

## 2025-03-29 SDOH — ECONOMIC STABILITY: TRANSPORTATION INSECURITY
IN THE PAST 12 MONTHS, HAS THE LACK OF TRANSPORTATION KEPT YOU FROM MEDICAL APPOINTMENTS OR FROM GETTING MEDICATIONS?: NO

## 2025-03-29 ASSESSMENT — PATIENT HEALTH QUESTIONNAIRE - PHQ9
2. FEELING DOWN, DEPRESSED OR HOPELESS: SEVERAL DAYS
1. LITTLE INTEREST OR PLEASURE IN DOING THINGS: NOT AT ALL
SUM OF ALL RESPONSES TO PHQ QUESTIONS 1-9: 1
2. FEELING DOWN, DEPRESSED OR HOPELESS: SEVERAL DAYS
1. LITTLE INTEREST OR PLEASURE IN DOING THINGS: NOT AT ALL
SUM OF ALL RESPONSES TO PHQ QUESTIONS 1-9: 1
SUM OF ALL RESPONSES TO PHQ9 QUESTIONS 1 & 2: 1
SUM OF ALL RESPONSES TO PHQ QUESTIONS 1-9: 1
SUM OF ALL RESPONSES TO PHQ QUESTIONS 1-9: 1

## 2025-04-01 ENCOUNTER — OFFICE VISIT (OUTPATIENT)
Dept: FAMILY MEDICINE CLINIC | Age: 65
End: 2025-04-01
Payer: MEDICARE

## 2025-04-01 VITALS
OXYGEN SATURATION: 98 % | HEART RATE: 70 BPM | BODY MASS INDEX: 28.02 KG/M2 | DIASTOLIC BLOOD PRESSURE: 82 MMHG | TEMPERATURE: 98.2 F | SYSTOLIC BLOOD PRESSURE: 138 MMHG | HEIGHT: 70 IN | WEIGHT: 195.7 LBS

## 2025-04-01 DIAGNOSIS — E78.5 HYPERLIPIDEMIA, UNSPECIFIED HYPERLIPIDEMIA TYPE: ICD-10-CM

## 2025-04-01 DIAGNOSIS — I10 ESSENTIAL HYPERTENSION: Primary | ICD-10-CM

## 2025-04-01 DIAGNOSIS — C61 PROSTATE CANCER (HCC): ICD-10-CM

## 2025-04-01 DIAGNOSIS — I25.10 CORONARY ARTERY DISEASE INVOLVING NATIVE CORONARY ARTERY OF NATIVE HEART WITHOUT ANGINA PECTORIS: ICD-10-CM

## 2025-04-01 PROCEDURE — 1123F ACP DISCUSS/DSCN MKR DOCD: CPT | Performed by: INTERNAL MEDICINE

## 2025-04-01 PROCEDURE — 1159F MED LIST DOCD IN RCRD: CPT | Performed by: INTERNAL MEDICINE

## 2025-04-01 PROCEDURE — 3079F DIAST BP 80-89 MM HG: CPT | Performed by: INTERNAL MEDICINE

## 2025-04-01 PROCEDURE — G8427 DOCREV CUR MEDS BY ELIG CLIN: HCPCS | Performed by: INTERNAL MEDICINE

## 2025-04-01 PROCEDURE — 3075F SYST BP GE 130 - 139MM HG: CPT | Performed by: INTERNAL MEDICINE

## 2025-04-01 PROCEDURE — G8419 CALC BMI OUT NRM PARAM NOF/U: HCPCS | Performed by: INTERNAL MEDICINE

## 2025-04-01 PROCEDURE — 99214 OFFICE O/P EST MOD 30 MIN: CPT | Performed by: INTERNAL MEDICINE

## 2025-04-01 PROCEDURE — 3017F COLORECTAL CA SCREEN DOC REV: CPT | Performed by: INTERNAL MEDICINE

## 2025-04-01 PROCEDURE — 1036F TOBACCO NON-USER: CPT | Performed by: INTERNAL MEDICINE

## 2025-04-01 ASSESSMENT — ENCOUNTER SYMPTOMS
SORE THROAT: 0
SINUS PAIN: 0
SHORTNESS OF BREATH: 0
EYE PAIN: 0
NAUSEA: 0
EYE DISCHARGE: 0
ABDOMINAL PAIN: 0
BLOOD IN STOOL: 0

## 2025-04-01 NOTE — PROGRESS NOTES
Chief Complaint   Patient presents with    Hyperlipidemia     Pt here for follow up on hyperlipidemia, pt reports feeling pretty good      Discuss Labs     Completed on 12/11/2025    Health Maintenance     Pneumonia vaccine       HPI:  Patient is here for follow-up     Feeling okay    No chest pain\  Has an appt with Cardio- Dr Mims 4/29    [facility] for [facility]- 5/14- Dr Christiansen      Allergy and Medications are reviewed and updated.  Past Medical History, Surgical History, and Family History has been reviewed and updated.    Review of Systems:  Review of Systems   Constitutional:  Negative for chills and fever.   HENT:  Negative for congestion, sinus pain and sore throat.    Eyes:  Negative for pain and discharge.   Respiratory:  Negative for shortness of breath (No new SOb).    Cardiovascular:  Negative for chest pain.   Gastrointestinal:  Negative for abdominal pain, blood in stool and nausea.   Genitourinary:  Negative for flank pain and frequency.   Musculoskeletal:  Negative for neck pain.   Hematological:  Does not bruise/bleed easily.   Psychiatric/Behavioral:  Negative for suicidal ideas.          Vitals:    04/01/25 0947   BP: 138/82   BP Site: Left Upper Arm   Patient Position: Sitting   Pulse: 70   Temp: 98.2 °F (36.8 °C)   TempSrc: Temporal   SpO2: 98%   Weight: 88.8 kg (195 lb 11.2 oz)   Height: 1.778 m (5' 10\")       Physical Exam  Vitals reviewed.   Constitutional:       Appearance: He is well-developed.   HENT:      Head: Normocephalic and atraumatic.   Eyes:      Conjunctiva/sclera: Conjunctivae normal.      Pupils: Pupils are equal, round, and reactive to light.   Neck:      Vascular: No JVD.   Cardiovascular:      Rate and Rhythm: Normal rate and regular rhythm.   Pulmonary:      Effort: Pulmonary effort is normal.      Breath sounds: Normal breath sounds.   Abdominal:      General: Bowel sounds are normal.      Palpations: Abdomen is soft.   Musculoskeletal:         General: Normal range of motion.

## 2025-04-01 NOTE — ASSESSMENT & PLAN NOTE
Monitored by specialist- no acute findings meriting change in the plan    Dr Valverde - Active surveillance. Also has enlarge prostate   Pt has appt with Dr Ferguson at [facility] - Second opinion

## 2025-04-14 DIAGNOSIS — M17.11 PRIMARY OSTEOARTHRITIS OF RIGHT KNEE: Primary | ICD-10-CM

## 2025-04-21 DIAGNOSIS — E78.5 HYPERLIPIDEMIA, UNSPECIFIED HYPERLIPIDEMIA TYPE: ICD-10-CM

## 2025-04-21 DIAGNOSIS — I10 ESSENTIAL HYPERTENSION: ICD-10-CM

## 2025-04-21 LAB
ALBUMIN: 4.6 G/DL (ref 3.5–5.2)
ALP BLD-CCNC: 54 U/L (ref 40–129)
ALT SERPL-CCNC: 47 U/L (ref 0–50)
ANION GAP SERPL CALCULATED.3IONS-SCNC: 12 MMOL/L (ref 7–16)
AST SERPL-CCNC: 37 U/L (ref 0–50)
BACTERIA: ABNORMAL
BASOPHILS ABSOLUTE: 0.03 K/UL (ref 0–0.2)
BASOPHILS RELATIVE PERCENT: 1 % (ref 0–2)
BILIRUB SERPL-MCNC: 0.8 MG/DL (ref 0–1.2)
BILIRUBIN, URINE: NEGATIVE
BUN BLDV-MCNC: 7 MG/DL (ref 8–23)
CALCIUM SERPL-MCNC: 9.8 MG/DL (ref 8.8–10.2)
CHLORIDE BLD-SCNC: 102 MMOL/L (ref 98–107)
CHOLESTEROL, FASTING: 165 MG/DL
CO2: 27 MMOL/L (ref 22–29)
COLOR, UA: YELLOW
CREAT SERPL-MCNC: 0.9 MG/DL (ref 0.7–1.2)
EOSINOPHILS ABSOLUTE: 0.07 K/UL (ref 0.05–0.5)
EOSINOPHILS RELATIVE PERCENT: 1 % (ref 0–6)
EPITHELIAL CELLS, UA: ABNORMAL /HPF
GFR, ESTIMATED: >90 ML/MIN/1.73M2
GLUCOSE FASTING: 91 MG/DL (ref 74–99)
GLUCOSE URINE: NEGATIVE MG/DL
HCT VFR BLD CALC: 47 % (ref 37–54)
HDLC SERPL-MCNC: 78 MG/DL
HEMOGLOBIN: 16.1 G/DL (ref 12.5–16.5)
IMMATURE GRANULOCYTES %: 0 % (ref 0–5)
IMMATURE GRANULOCYTES ABSOLUTE: <0.03 K/UL (ref 0–0.58)
KETONES, URINE: NEGATIVE MG/DL
LDL CHOLESTEROL: 71 MG/DL
LEUKOCYTE ESTERASE, URINE: NEGATIVE
LYMPHOCYTES ABSOLUTE: 1.45 K/UL (ref 1.5–4)
LYMPHOCYTES RELATIVE PERCENT: 22 % (ref 20–42)
MCH RBC QN AUTO: 32.7 PG (ref 26–35)
MCHC RBC AUTO-ENTMCNC: 34.3 G/DL (ref 32–34.5)
MCV RBC AUTO: 95.5 FL (ref 80–99.9)
MONOCYTES ABSOLUTE: 0.77 K/UL (ref 0.1–0.95)
MONOCYTES RELATIVE PERCENT: 12 % (ref 2–12)
NEUTROPHILS ABSOLUTE: 4.23 K/UL (ref 1.8–7.3)
NEUTROPHILS RELATIVE PERCENT: 64 % (ref 43–80)
NITRITE, URINE: NEGATIVE
PDW BLD-RTO: 12.8 % (ref 11.5–15)
PH, URINE: 8.5 (ref 5–8)
PLATELET # BLD: 147 K/UL (ref 130–450)
PMV BLD AUTO: 10.6 FL (ref 7–12)
POTASSIUM SERPL-SCNC: 3.9 MMOL/L (ref 3.5–5.1)
PROTEIN UA: NEGATIVE MG/DL
RBC # BLD: 4.92 M/UL (ref 3.8–5.8)
RBC UA: ABNORMAL /HPF
SODIUM BLD-SCNC: 140 MMOL/L (ref 136–145)
SPECIFIC GRAVITY UA: 1.01 (ref 1–1.03)
TOTAL PROTEIN: 6.8 G/DL (ref 6.4–8.3)
TRIGLYCERIDE, FASTING: 78 MG/DL
TSH SERPL DL<=0.05 MIU/L-ACNC: 4.18 UIU/ML (ref 0.27–4.2)
TURBIDITY: CLEAR
URINE HGB: NEGATIVE
UROBILINOGEN, URINE: 0.2 EU/DL (ref 0–1)
VLDLC SERPL CALC-MCNC: 16 MG/DL
WBC # BLD: 6.6 K/UL (ref 4.5–11.5)
WBC UA: ABNORMAL /HPF

## 2025-04-22 ENCOUNTER — PREP FOR PROCEDURE (OUTPATIENT)
Dept: ORTHOPEDIC SURGERY | Age: 65
End: 2025-04-22

## 2025-04-25 ENCOUNTER — TELEPHONE (OUTPATIENT)
Dept: CARDIOLOGY CLINIC | Age: 65
End: 2025-04-25

## 2025-04-29 ENCOUNTER — OFFICE VISIT (OUTPATIENT)
Dept: CARDIOLOGY CLINIC | Age: 65
End: 2025-04-29
Payer: MEDICARE

## 2025-04-29 VITALS
HEART RATE: 72 BPM | BODY MASS INDEX: 27.37 KG/M2 | RESPIRATION RATE: 18 BRPM | WEIGHT: 191.2 LBS | DIASTOLIC BLOOD PRESSURE: 78 MMHG | HEIGHT: 70 IN | SYSTOLIC BLOOD PRESSURE: 138 MMHG

## 2025-04-29 DIAGNOSIS — F10.10 ALCOHOL ABUSE: ICD-10-CM

## 2025-04-29 DIAGNOSIS — I25.10 CAD IN NATIVE ARTERY: Primary | ICD-10-CM

## 2025-04-29 DIAGNOSIS — Z98.61 HISTORY OF PTCA: ICD-10-CM

## 2025-04-29 DIAGNOSIS — Z98.890 HX OF ARTHROSCOPY OF RIGHT KNEE: ICD-10-CM

## 2025-04-29 DIAGNOSIS — M15.0 PRIMARY OSTEOARTHRITIS INVOLVING MULTIPLE JOINTS: ICD-10-CM

## 2025-04-29 DIAGNOSIS — Z79.899 ON STATIN THERAPY: ICD-10-CM

## 2025-04-29 DIAGNOSIS — I10 ESSENTIAL HYPERTENSION: ICD-10-CM

## 2025-04-29 DIAGNOSIS — Z01.818 PREOPERATIVE CLEARANCE: ICD-10-CM

## 2025-04-29 DIAGNOSIS — Z86.39 H/O: HYPOTHYROIDISM: ICD-10-CM

## 2025-04-29 DIAGNOSIS — E78.2 MIXED HYPERLIPIDEMIA: ICD-10-CM

## 2025-04-29 PROCEDURE — 3017F COLORECTAL CA SCREEN DOC REV: CPT | Performed by: INTERNAL MEDICINE

## 2025-04-29 PROCEDURE — 1036F TOBACCO NON-USER: CPT | Performed by: INTERNAL MEDICINE

## 2025-04-29 PROCEDURE — 3078F DIAST BP <80 MM HG: CPT | Performed by: INTERNAL MEDICINE

## 2025-04-29 PROCEDURE — 3075F SYST BP GE 130 - 139MM HG: CPT | Performed by: INTERNAL MEDICINE

## 2025-04-29 PROCEDURE — 1159F MED LIST DOCD IN RCRD: CPT | Performed by: INTERNAL MEDICINE

## 2025-04-29 PROCEDURE — 99214 OFFICE O/P EST MOD 30 MIN: CPT | Performed by: INTERNAL MEDICINE

## 2025-04-29 PROCEDURE — 1160F RVW MEDS BY RX/DR IN RCRD: CPT | Performed by: INTERNAL MEDICINE

## 2025-04-29 PROCEDURE — 93000 ELECTROCARDIOGRAM COMPLETE: CPT | Performed by: INTERNAL MEDICINE

## 2025-04-29 PROCEDURE — G8427 DOCREV CUR MEDS BY ELIG CLIN: HCPCS | Performed by: INTERNAL MEDICINE

## 2025-04-29 PROCEDURE — 1123F ACP DISCUSS/DSCN MKR DOCD: CPT | Performed by: INTERNAL MEDICINE

## 2025-04-29 PROCEDURE — G8419 CALC BMI OUT NRM PARAM NOF/U: HCPCS | Performed by: INTERNAL MEDICINE

## 2025-04-29 RX ORDER — TADALAFIL 5 MG/1
5 TABLET ORAL DAILY
COMMUNITY
Start: 2025-04-07

## 2025-04-29 NOTE — PROGRESS NOTES
OFFICE VISIT        PRIMARY CARE PHYSICIAN:    Aster West MD         ALLERGIES / SENSITIVITIES:    No Known Allergies       REVIEWED MEDICATIONS:      Current Outpatient Medications:     tadalafil (CIALIS) 5 MG tablet, Take 1 tablet by mouth daily, Disp: , Rfl:     nystatin-triamcinolone (MYCOLOG II) 954344-8.1 UNIT/GM-% cream, Apply topically 2 times daily., Disp: 60 g, Rfl: 3    atenolol (TENORMIN) 25 MG tablet, Take 1 tablet by mouth every morning, Disp: 90 tablet, Rfl: 1    amLODIPine (NORVASC) 5 MG tablet, Take 1 tablet by mouth every morning, Disp: 90 tablet, Rfl: 1    hydroCHLOROthiazide 12.5 MG capsule, Take 1 capsule by mouth every evening, Disp: 90 capsule, Rfl: 1    olmesartan (BENICAR) 40 MG tablet, Take 1 tablet by mouth every evening, Disp: 90 tablet, Rfl: 1    rosuvastatin (CRESTOR) 20 MG tablet, Take 1 tablet by mouth at bedtime, Disp: 90 tablet, Rfl: 1    Cholecalciferol (VITAMIN D) 50 MCG (2000 UT) CAPS capsule, Take by mouth, Disp: , Rfl:     Zinc 15 MG CAPS, Take 30 mg by mouth daily, Disp: , Rfl:     aspirin 81 MG tablet, Take 1 tablet by mouth daily Pt not to hold per cardiologist, Disp: , Rfl:     Multiple Vitamins-Minerals (MULTIVITAL-M PO), Take  by mouth. Woody smith, Disp: , Rfl:         S: REASON FOR VISIT:    Coronary artery disease.  Shaheed is a pleasant 65-year-old male with cardiovascular/as below.  He was last seen by me in the office on 3/14/2024.  He had colonoscopy 3/21/2024 which was unremarkable.  He underwent right knee arthroscopy chondroplasty mfc/lfc/patella/trochlea 1/13/2025.  He is scheduled to undergo total right knee arthroplasty in 2 weeks from today.  He has been doing aerobic exercises and muscle strengthening exercises 5 days a week including elliptical for 30 minutes on the days he is doing muscle strengthening exercises and for 60 minutes on the days where he does not do muscle strengthening exercises.  He denies chest pain or dyspnea with his

## 2025-05-02 RX ORDER — SODIUM CHLORIDE, SODIUM LACTATE, POTASSIUM CHLORIDE, CALCIUM CHLORIDE 600; 310; 30; 20 MG/100ML; MG/100ML; MG/100ML; MG/100ML
INJECTION, SOLUTION INTRAVENOUS CONTINUOUS
OUTPATIENT
Start: 2025-05-14

## 2025-05-02 RX ORDER — SCOPOLAMINE 1 MG/3D
1 PATCH, EXTENDED RELEASE TRANSDERMAL ONCE
OUTPATIENT
Start: 2025-05-14

## 2025-05-06 ENCOUNTER — HOSPITAL ENCOUNTER (OUTPATIENT)
Dept: PREADMISSION TESTING | Age: 65
Discharge: HOME | End: 2025-05-06
Payer: MEDICARE

## 2025-05-06 ENCOUNTER — ANESTHESIA EVENT (OUTPATIENT)
Dept: OPERATING ROOM | Age: 65
DRG: 470 | End: 2025-05-06
Payer: MEDICARE

## 2025-05-06 ENCOUNTER — HOSPITAL ENCOUNTER (OUTPATIENT)
Dept: GENERAL RADIOLOGY | Age: 65
Discharge: HOME | End: 2025-05-08
Payer: MEDICARE

## 2025-05-06 VITALS
RESPIRATION RATE: 18 BRPM | HEIGHT: 70 IN | OXYGEN SATURATION: 97 % | DIASTOLIC BLOOD PRESSURE: 80 MMHG | SYSTOLIC BLOOD PRESSURE: 147 MMHG | TEMPERATURE: 98 F | HEART RATE: 58 BPM | BODY MASS INDEX: 27.2 KG/M2 | WEIGHT: 190 LBS

## 2025-05-06 DIAGNOSIS — Z01.818 PRE-OP EVALUATION: ICD-10-CM

## 2025-05-06 LAB
ALBUMIN SERPL-MCNC: 4.8 G/DL (ref 3.5–5.2)
ALP SERPL-CCNC: 62 U/L (ref 40–129)
ALT SERPL-CCNC: 42 U/L (ref 0–40)
ANION GAP SERPL CALCULATED.3IONS-SCNC: 13 MMOL/L (ref 7–16)
AST SERPL-CCNC: 36 U/L (ref 0–39)
BASOPHILS # BLD: 0.02 K/UL (ref 0–0.2)
BASOPHILS NFR BLD: 0 % (ref 0–2)
BILIRUB SERPL-MCNC: 0.7 MG/DL (ref 0–1.2)
BILIRUB UR QL STRIP: NEGATIVE
BUN SERPL-MCNC: 13 MG/DL (ref 6–23)
CALCIUM SERPL-MCNC: 10 MG/DL (ref 8.6–10.2)
CHLORIDE SERPL-SCNC: 101 MMOL/L (ref 98–107)
CLARITY UR: CLEAR
CO2 SERPL-SCNC: 25 MMOL/L (ref 22–29)
COLOR UR: YELLOW
COMMENT: NORMAL
CREAT SERPL-MCNC: 0.7 MG/DL (ref 0.7–1.2)
EOSINOPHIL # BLD: 0.03 K/UL (ref 0.05–0.5)
EOSINOPHILS RELATIVE PERCENT: 1 % (ref 0–6)
ERYTHROCYTE [DISTWIDTH] IN BLOOD BY AUTOMATED COUNT: 13 % (ref 11.5–15)
GFR, ESTIMATED: >90 ML/MIN/1.73M2
GLUCOSE SERPL-MCNC: 99 MG/DL (ref 74–99)
GLUCOSE UR STRIP-MCNC: NEGATIVE MG/DL
HBA1C MFR BLD: 5.3 % (ref 4–5.6)
HCT VFR BLD AUTO: 45.5 % (ref 37–54)
HGB BLD-MCNC: 16.3 G/DL (ref 12.5–16.5)
HGB UR QL STRIP.AUTO: NEGATIVE
IMM GRANULOCYTES # BLD AUTO: <0.03 K/UL (ref 0–0.58)
IMM GRANULOCYTES NFR BLD: 0 % (ref 0–5)
INR PPP: 1
KETONES UR STRIP-MCNC: NEGATIVE MG/DL
LEUKOCYTE ESTERASE UR QL STRIP: NEGATIVE
LYMPHOCYTES NFR BLD: 1.3 K/UL (ref 1.5–4)
LYMPHOCYTES RELATIVE PERCENT: 25 % (ref 20–42)
MCH RBC QN AUTO: 33.2 PG (ref 26–35)
MCHC RBC AUTO-ENTMCNC: 35.8 G/DL (ref 32–34.5)
MCV RBC AUTO: 92.7 FL (ref 80–99.9)
MONOCYTES NFR BLD: 0.61 K/UL (ref 0.1–0.95)
MONOCYTES NFR BLD: 12 % (ref 2–12)
NEUTROPHILS NFR BLD: 62 % (ref 43–80)
NEUTS SEG NFR BLD: 3.17 K/UL (ref 1.8–7.3)
NITRITE UR QL STRIP: NEGATIVE
PARTIAL THROMBOPLASTIN TIME: 33.4 SEC (ref 24.5–35.1)
PH UR STRIP: 7 [PH] (ref 5–8)
PLATELET, FLUORESCENCE: 127 K/UL (ref 130–450)
PMV BLD AUTO: 10.3 FL (ref 7–12)
POTASSIUM SERPL-SCNC: 4 MMOL/L (ref 3.5–5)
PREALB SERPL-MCNC: 37.2 MG/DL (ref 20–40)
PROT SERPL-MCNC: 7.3 G/DL (ref 6.4–8.3)
PROT UR STRIP-MCNC: NEGATIVE MG/DL
PROTHROMBIN TIME: 10.6 SEC (ref 9.3–12.4)
RBC # BLD AUTO: 4.91 M/UL (ref 3.8–5.8)
SODIUM SERPL-SCNC: 139 MMOL/L (ref 132–146)
SP GR UR STRIP: 1.01 (ref 1–1.03)
UROBILINOGEN UR STRIP-ACNC: 0.2 EU/DL (ref 0–1)
WBC OTHER # BLD: 5.2 K/UL (ref 4.5–11.5)

## 2025-05-06 PROCEDURE — 87086 URINE CULTURE/COLONY COUNT: CPT

## 2025-05-06 PROCEDURE — 81003 URINALYSIS AUTO W/O SCOPE: CPT

## 2025-05-06 PROCEDURE — 71046 X-RAY EXAM CHEST 2 VIEWS: CPT

## 2025-05-06 PROCEDURE — 85025 COMPLETE CBC W/AUTO DIFF WBC: CPT

## 2025-05-06 PROCEDURE — 87081 CULTURE SCREEN ONLY: CPT

## 2025-05-06 PROCEDURE — 85730 THROMBOPLASTIN TIME PARTIAL: CPT

## 2025-05-06 PROCEDURE — 80053 COMPREHEN METABOLIC PANEL: CPT

## 2025-05-06 PROCEDURE — 84134 ASSAY OF PREALBUMIN: CPT

## 2025-05-06 PROCEDURE — 85610 PROTHROMBIN TIME: CPT

## 2025-05-06 PROCEDURE — 83036 HEMOGLOBIN GLYCOSYLATED A1C: CPT

## 2025-05-06 NOTE — ANESTHESIA PRE PROCEDURE
4.91 05/06/2025 10:00 AM    HGB 16.3 05/06/2025 10:00 AM    HCT 45.5 05/06/2025 10:00 AM    MCV 92.7 05/06/2025 10:00 AM    RDW 13.0 05/06/2025 10:00 AM     04/21/2025 08:18 AM       CMP:   Lab Results   Component Value Date/Time     05/06/2025 10:00 AM    K 4.0 05/06/2025 10:00 AM    K 4.0 03/21/2022 05:06 PM     05/06/2025 10:00 AM    CO2 25 05/06/2025 10:00 AM    BUN 13 05/06/2025 10:00 AM    CREATININE 0.7 05/06/2025 10:00 AM    GFRAA >60 08/04/2022 01:34 PM    LABGLOM >90 05/06/2025 10:00 AM    LABGLOM >60 01/09/2024 08:09 AM    GLUCOSE 99 05/06/2025 10:00 AM    GLUCOSE 82 08/04/2011 06:30 AM    CALCIUM 10.0 05/06/2025 10:00 AM    BILITOT 0.7 05/06/2025 10:00 AM    ALKPHOS 62 05/06/2025 10:00 AM    AST 36 05/06/2025 10:00 AM    ALT 42 05/06/2025 10:00 AM       POC Tests: No results for input(s): \"POCGLU\", \"POCNA\", \"POCK\", \"POCCL\", \"POCBUN\", \"POCHEMO\", \"POCHCT\" in the last 72 hours.    Coags:   Lab Results   Component Value Date/Time    PROTIME 10.6 05/06/2025 10:00 AM    PROTIME 14.7 01/27/2011 03:00 AM    INR 1.0 05/06/2025 10:00 AM    APTT 30.5 06/17/2012 03:15 AM       HCG (If Applicable): No results found for: \"PREGTESTUR\", \"PREGSERUM\", \"HCG\", \"HCGQUANT\"     ABGs: No results found for: \"PHART\", \"PO2ART\", \"ACF9QQA\", \"SRM0ZYN\", \"BEART\", \"L6FTCJXY\"     Type & Screen (If Applicable):  No results found for: \"ABORH\", \"LABANTI\"    Drug/Infectious Status (If Applicable):  No results found for: \"HIV\", \"HEPCAB\"    COVID-19 Screening (If Applicable):   Lab Results   Component Value Date/Time    COVID19 Not Detected 03/02/2024 02:24 PM    COVID19 NOT DETECTED 05/19/2020 01:07 PM           Anesthesia Evaluation  Patient summary reviewed   history of anesthetic complications: PONV.  Airway: Mallampati: II  TM distance: <3 FB   Neck ROM: full  Mouth opening: > = 3 FB   Dental: normal exam         Pulmonary:Negative Pulmonary ROS breath sounds clear to auscultation

## 2025-05-06 NOTE — PROGRESS NOTES
Patient attended preoperative Total Joint Camp on 5/6/2025.  Patient is scheduled to have an elective knee replacement.  Patient was educated regarding Disease Process, Medications, Smoking Cessation, Oxygenation, Incentive Spirometry and Deep Breath and Cough, signs and symptoms of postoperative joint infection that include: Fever, Chills, Pain Control, Drainage and Redness, post-op follow up with orthopaedic surgeon, dressing removal, staple removal, ambulatory devices which include a wheeled walker and cane, bed mobility, correct anatomical alignment, active range of motion, proper transferring technique, incision care, infection prevention measures, non-pharmacologic comfort measures, notification of inadequate pain control measures, pain scale for assessing level of pain, pharmacologic pain management, relaxation techniques.     
do not have a ride home or someone to stay with you.    PEDIATRIC PATIENTS ONLY:  A parent/legal guardian must accompany a child scheduled for surgery and plan to stay at the hospital until the child is discharged.  Please do not bring other children with you.    Please wear simple, loose fitting clothing to the hospital.  Do not bring valuables (money, credit cards, checkbooks, etc.) Do not wear any makeup (including no eye makeup) or nail polish on your fingers or toes.    DO NOT wear any jewelry or piercings on day of surgery.  All body piercings and jewelry must be removed.    Shower the MORNING of surgery with an Antibacterial soap.    HYSTERECTOMY patients ONLY - Remember to bring the green Blood Bank bracelet to the hospital on the day of surgery.    If you have a Living Will and/or Durable Power of  for Healthcare, please bring in a copy.    If appropriate bring crutches, inspirex, walker, cane etc...    Notify your Surgeon if you develop any illness between now and surgery time, cough, cold, fever, sore throat, nausea, vomiting, etc.  Please notify your surgeon if you experience dizziness, shortness of breath or blurred vision between now & the time of your surgery.    If you wear dentures, they will need to be removed before going into surgery, we will provide you with a container. If you wear contact lenses or glasses, they will need to be removed, please bring a case for them.    To provide excellent care visitors will be limited to 1 person in the room at any given time.                                                                                         No children under the age of 16 are permitted in the hospital for the safety of all patients.     Any implantable device requiring remote therapy, Please bring remote day of surgery and bring your implant card with you!      21. If you use a C-PAP please bring settings with you, do not bring the machine.    22. Other: walk in through visitors

## 2025-05-07 LAB
MICROORGANISM SPEC CULT: NO GROWTH
MICROORGANISM SPEC CULT: NORMAL
SERVICE CMNT-IMP: NORMAL
SPECIMEN DESCRIPTION: NORMAL
SPECIMEN DESCRIPTION: NORMAL

## 2025-05-07 ASSESSMENT — PROMIS GLOBAL HEALTH SCALE
IN GENERAL, HOW WOULD YOU RATE YOUR MENTAL HEALTH, INCLUDING YOUR MOOD AND YOUR ABILITY TO THINK [ON A SCALE OF 1 (POOR) TO 5 (EXCELLENT)]?: VERY GOOD
TO WHAT EXTENT ARE YOU ABLE TO CARRY OUT YOUR EVERYDAY PHYSICAL ACTIVITIES SUCH AS WALKING, CLIMBING STAIRS, CARRYING GROCERIES, OR MOVING A CHAIR [ON A SCALE OF 1 (NOT AT ALL) TO 5 (COMPLETELY)]?: MOSTLY
IN GENERAL, PLEASE RATE HOW WELL YOU CARRY OUT YOUR USUAL SOCIAL ACTIVITIES (INCLUDES ACTIVITIES AT HOME, AT WORK, AND IN YOUR COMMUNITY, AND RESPONSIBILITIES AS A PARENT, CHILD, SPOUSE, EMPLOYEE, FRIEND, ETC) [ON A SCALE OF 1 (POOR) TO 5 (EXCELLENT)]?: VERY GOOD
IN THE PAST 7 DAYS, HOW OFTEN HAVE YOU BEEN BOTHERED BY EMOTIONAL PROBLEMS, SUCH AS FEELING ANXIOUS, DEPRESSED, OR IRRITABLE [ON A SCALE FROM 1 (NEVER) TO 5 (ALWAYS)]?: RARELY
WHO IS THE PERSON COMPLETING THE PROMIS V1.1 SURVEY?: SELF
IN GENERAL, HOW WOULD YOU RATE YOUR PHYSICAL HEALTH [ON A SCALE OF 1 (POOR) TO 5 (EXCELLENT)]?: VERY GOOD
IN THE PAST 7 DAYS, HOW WOULD YOU RATE YOUR PAIN ON AVERAGE [ON A SCALE FROM 0 (NO PAIN) TO 10 (WORST IMAGINABLE PAIN)]?: 1
IN GENERAL, WOULD YOU SAY YOUR HEALTH IS...[ON A SCALE OF 1 (POOR) TO 5 (EXCELLENT)]: VERY GOOD
SUM OF RESPONSES TO QUESTIONS 2, 4, 5, & 10: 16
HOW IS THE PROMIS V1.1 BEING ADMINISTERED?: PAPER
IN GENERAL, WOULD YOU SAY YOUR QUALITY OF LIFE IS...[ON A SCALE OF 1 (POOR) TO 5 (EXCELLENT)]: VERY GOOD
SUM OF RESPONSES TO QUESTIONS 3, 6, 7, & 8: 14
IN GENERAL, HOW WOULD YOU RATE YOUR SATISFACTION WITH YOUR SOCIAL ACTIVITIES AND RELATIONSHIPS [ON A SCALE OF 1 (POOR) TO 5 (EXCELLENT)]?: VERY GOOD

## 2025-05-07 ASSESSMENT — KOOS JR
RISING FROM SITTING: MILD
TWISING OR PIVOTING ON KNEE: MILD
KOOS JR TOTAL INTERVAL SCORE: 76.332
GOING UP OR DOWN STAIRS: MILD
BENDING TO THE FLOOR TO PICK UP OBJECT: MILD

## 2025-05-10 NOTE — CARE COORDINATION
5/10/2025: SS/PAT NOTE:  Pt having surgery for a elective total knee arthroplasty on 5/14, reviewed rehab needs and HHC/DME options under Medicare, pt plans to return home day of surgery from [facility] , pt will have help from his wife who will transport pt home, pt prefers [facility] HHC/DME, referrals made to Daniella liaison for [facility] by [facility] who will follow post-op for home PT orders for SOC on 5/15 and to Melba at [facility] for a w/w to be delivered to [facility] for pt to take home prior to discharge, 2 entry steps, no rails, pt declined need for a bsc or shower seat, Nursing informed for HHC/DME orders needed post-op.Electronically signed by STELLA Priest on 5/10/2025 at 11:55 AM

## 2025-05-10 NOTE — ACP (ADVANCE CARE PLANNING)
Advance Care Planning   Healthcare Decision Maker:    Primary Decision Maker: Shahrzad Clayton - Spouse - [phone]    Secondary Decision Maker: CODIE LUIS - Other - [phone]    Click here to complete Healthcare Decision Makers including selection of the Healthcare Decision Maker Relationship (ie \"Primary\").  Today we documented Decision Maker(s) consistent with Legal Next of Kin hierarchy.

## 2025-05-14 ENCOUNTER — APPOINTMENT (OUTPATIENT)
Dept: GENERAL RADIOLOGY | Age: 65
DRG: 470 | End: 2025-05-14
Attending: ORTHOPAEDIC SURGERY
Payer: MEDICARE

## 2025-05-14 ENCOUNTER — ANESTHESIA (OUTPATIENT)
Dept: OPERATING ROOM | Age: 65
DRG: 470 | End: 2025-05-14
Payer: MEDICARE

## 2025-05-14 ENCOUNTER — HOSPITAL ENCOUNTER (INPATIENT)
Age: 65
LOS: 2 days | Discharge: HOME HEALTH | DRG: 470 | End: 2025-05-16
Attending: ORTHOPAEDIC SURGERY | Admitting: ORTHOPAEDIC SURGERY
Payer: MEDICARE

## 2025-05-14 DIAGNOSIS — M17.11 RIGHT KNEE DJD: ICD-10-CM

## 2025-05-14 DIAGNOSIS — M17.11 PRIMARY OSTEOARTHRITIS OF RIGHT KNEE: ICD-10-CM

## 2025-05-14 DIAGNOSIS — Z98.890 POST-OPERATIVE STATE: ICD-10-CM

## 2025-05-14 DIAGNOSIS — Z01.818 PRE-OP EVALUATION: Primary | ICD-10-CM

## 2025-05-14 PROCEDURE — 6370000000 HC RX 637 (ALT 250 FOR IP): Performed by: ORTHOPAEDIC SURGERY

## 2025-05-14 PROCEDURE — 3700000000 HC ANESTHESIA ATTENDED CARE: Performed by: ORTHOPAEDIC SURGERY

## 2025-05-14 PROCEDURE — 0SRC0J9 REPLACEMENT OF RIGHT KNEE JOINT WITH SYNTHETIC SUBSTITUTE, CEMENTED, OPEN APPROACH: ICD-10-PCS | Performed by: ORTHOPAEDIC SURGERY

## 2025-05-14 PROCEDURE — 2709999900 HC NON-CHARGEABLE SUPPLY: Performed by: ORTHOPAEDIC SURGERY

## 2025-05-14 PROCEDURE — 2580000003 HC RX 258: Performed by: ANESTHESIOLOGY

## 2025-05-14 PROCEDURE — C1776 JOINT DEVICE (IMPLANTABLE): HCPCS | Performed by: ORTHOPAEDIC SURGERY

## 2025-05-14 PROCEDURE — 6360000002 HC RX W HCPCS: Performed by: ORTHOPAEDIC SURGERY

## 2025-05-14 PROCEDURE — 97110 THERAPEUTIC EXERCISES: CPT | Performed by: PHYSICAL THERAPIST

## 2025-05-14 PROCEDURE — 2500000003 HC RX 250 WO HCPCS: Performed by: ORTHOPAEDIC SURGERY

## 2025-05-14 PROCEDURE — 6360000002 HC RX W HCPCS

## 2025-05-14 PROCEDURE — 3E0T3BZ INTRODUCTION OF ANESTHETIC AGENT INTO PERIPHERAL NERVES AND PLEXI, PERCUTANEOUS APPROACH: ICD-10-PCS | Performed by: ANESTHESIOLOGY

## 2025-05-14 PROCEDURE — 99222 1ST HOSP IP/OBS MODERATE 55: CPT | Performed by: INTERNAL MEDICINE

## 2025-05-14 PROCEDURE — 97161 PT EVAL LOW COMPLEX 20 MIN: CPT | Performed by: PHYSICAL THERAPIST

## 2025-05-14 PROCEDURE — 2500000003 HC RX 250 WO HCPCS

## 2025-05-14 PROCEDURE — 7100000000 HC PACU RECOVERY - FIRST 15 MIN: Performed by: ORTHOPAEDIC SURGERY

## 2025-05-14 PROCEDURE — 3600000015 HC SURGERY LEVEL 5 ADDTL 15MIN: Performed by: ORTHOPAEDIC SURGERY

## 2025-05-14 PROCEDURE — 2580000003 HC RX 258: Performed by: ORTHOPAEDIC SURGERY

## 2025-05-14 PROCEDURE — 97116 GAIT TRAINING THERAPY: CPT | Performed by: PHYSICAL THERAPIST

## 2025-05-14 PROCEDURE — 2720000010 HC SURG SUPPLY STERILE: Performed by: ORTHOPAEDIC SURGERY

## 2025-05-14 PROCEDURE — 2500000003 HC RX 250 WO HCPCS: Performed by: NURSE ANESTHETIST, CERTIFIED REGISTERED

## 2025-05-14 PROCEDURE — 73560 X-RAY EXAM OF KNEE 1 OR 2: CPT

## 2025-05-14 PROCEDURE — 6360000002 HC RX W HCPCS: Performed by: ANESTHESIOLOGY

## 2025-05-14 PROCEDURE — 3600000005 HC SURGERY LEVEL 5 BASE: Performed by: ORTHOPAEDIC SURGERY

## 2025-05-14 PROCEDURE — 1200000000 HC SEMI PRIVATE

## 2025-05-14 PROCEDURE — 64447 NJX AA&/STRD FEMORAL NRV IMG: CPT | Performed by: ANESTHESIOLOGY

## 2025-05-14 PROCEDURE — 51798 US URINE CAPACITY MEASURE: CPT

## 2025-05-14 PROCEDURE — 6360000002 HC RX W HCPCS: Performed by: INTERNAL MEDICINE

## 2025-05-14 PROCEDURE — 2580000003 HC RX 258

## 2025-05-14 PROCEDURE — 3700000001 HC ADD 15 MINUTES (ANESTHESIA): Performed by: ORTHOPAEDIC SURGERY

## 2025-05-14 PROCEDURE — 6370000000 HC RX 637 (ALT 250 FOR IP)

## 2025-05-14 PROCEDURE — 7100000001 HC PACU RECOVERY - ADDTL 15 MIN: Performed by: ORTHOPAEDIC SURGERY

## 2025-05-14 DEVICE — CRUCIATE RETAINING FEMORAL
Type: IMPLANTABLE DEVICE | Site: KNEE | Status: FUNCTIONAL
Brand: TRIATHLON

## 2025-05-14 DEVICE — COMPONENT TOT KNEE CAPPED PRIMARY K2STRYKER] STRYKER CORP]: Type: IMPLANTABLE DEVICE | Site: PATELLA | Status: FUNCTIONAL

## 2025-05-14 DEVICE — PATELLA
Type: IMPLANTABLE DEVICE | Site: PATELLA | Status: FUNCTIONAL
Brand: TRIATHLON

## 2025-05-14 DEVICE — TIBIAL COMPONENT
Type: IMPLANTABLE DEVICE | Site: KNEE | Status: FUNCTIONAL
Brand: TRIATHLON

## 2025-05-14 DEVICE — TIBIAL BEARING INSERT - CS
Type: IMPLANTABLE DEVICE | Site: KNEE | Status: FUNCTIONAL
Brand: TRIATHLON

## 2025-05-14 RX ORDER — OXYCODONE AND ACETAMINOPHEN 10; 325 MG/1; MG/1
1 TABLET ORAL EVERY 6 HOURS PRN
Qty: 28 TABLET | Refills: 0 | Status: SHIPPED | OUTPATIENT
Start: 2025-05-14 | End: 2025-05-14 | Stop reason: HOSPADM

## 2025-05-14 RX ORDER — HYDRALAZINE HYDROCHLORIDE 20 MG/ML
10 INJECTION INTRAMUSCULAR; INTRAVENOUS
Status: DISCONTINUED | OUTPATIENT
Start: 2025-05-14 | End: 2025-05-14 | Stop reason: HOSPADM

## 2025-05-14 RX ORDER — SENNOSIDES 8.6 MG
325 CAPSULE ORAL 2 TIMES DAILY
Status: DISCONTINUED | OUTPATIENT
Start: 2025-05-15 | End: 2025-05-15

## 2025-05-14 RX ORDER — IPRATROPIUM BROMIDE AND ALBUTEROL SULFATE 2.5; .5 MG/3ML; MG/3ML
1 SOLUTION RESPIRATORY (INHALATION)
Status: DISCONTINUED | OUTPATIENT
Start: 2025-05-14 | End: 2025-05-14 | Stop reason: HOSPADM

## 2025-05-14 RX ORDER — ENOXAPARIN SODIUM 100 MG/ML
40 INJECTION SUBCUTANEOUS DAILY
Status: DISCONTINUED | OUTPATIENT
Start: 2025-05-14 | End: 2025-05-16 | Stop reason: HOSPADM

## 2025-05-14 RX ORDER — PROCHLORPERAZINE EDISYLATE 5 MG/ML
5 INJECTION INTRAMUSCULAR; INTRAVENOUS
Status: DISCONTINUED | OUTPATIENT
Start: 2025-05-14 | End: 2025-05-14 | Stop reason: HOSPADM

## 2025-05-14 RX ORDER — MIDAZOLAM HYDROCHLORIDE 1 MG/ML
2 INJECTION, SOLUTION INTRAMUSCULAR; INTRAVENOUS EVERY 5 MIN PRN
Status: COMPLETED | OUTPATIENT
Start: 2025-05-14 | End: 2025-05-14

## 2025-05-14 RX ORDER — LABETALOL HYDROCHLORIDE 5 MG/ML
10 INJECTION, SOLUTION INTRAVENOUS
Status: DISCONTINUED | OUTPATIENT
Start: 2025-05-14 | End: 2025-05-14 | Stop reason: HOSPADM

## 2025-05-14 RX ORDER — ROPIVACAINE HYDROCHLORIDE 5 MG/ML
INJECTION, SOLUTION EPIDURAL; INFILTRATION; PERINEURAL
Status: DISPENSED
Start: 2025-05-14 | End: 2025-05-14

## 2025-05-14 RX ORDER — FENTANYL CITRATE 0.05 MG/ML
25 INJECTION, SOLUTION INTRAMUSCULAR; INTRAVENOUS EVERY 5 MIN PRN
Status: DISCONTINUED | OUTPATIENT
Start: 2025-05-14 | End: 2025-05-14 | Stop reason: HOSPADM

## 2025-05-14 RX ORDER — CELECOXIB 100 MG/1
100 CAPSULE ORAL ONCE
Status: COMPLETED | OUTPATIENT
Start: 2025-05-14 | End: 2025-05-14

## 2025-05-14 RX ORDER — SCOPOLAMINE 1 MG/3D
1 PATCH, EXTENDED RELEASE TRANSDERMAL
Status: DISCONTINUED | OUTPATIENT
Start: 2025-05-14 | End: 2025-05-16 | Stop reason: HOSPADM

## 2025-05-14 RX ORDER — MIDAZOLAM HYDROCHLORIDE 1 MG/ML
INJECTION, SOLUTION INTRAMUSCULAR; INTRAVENOUS
Status: COMPLETED
Start: 2025-05-14 | End: 2025-05-14

## 2025-05-14 RX ORDER — PHENYLEPHRINE HCL IN 0.9% NACL 1 MG/10 ML
SYRINGE (ML) INTRAVENOUS
Status: DISCONTINUED | OUTPATIENT
Start: 2025-05-14 | End: 2025-05-14 | Stop reason: SDUPTHER

## 2025-05-14 RX ORDER — OXYCODONE HYDROCHLORIDE 10 MG/1
10 TABLET ORAL EVERY 6 HOURS PRN
Qty: 28 TABLET | Refills: 0 | Status: SHIPPED | OUTPATIENT
Start: 2025-05-14 | End: 2025-05-21

## 2025-05-14 RX ORDER — ONDANSETRON 2 MG/ML
4 INJECTION INTRAMUSCULAR; INTRAVENOUS EVERY 6 HOURS PRN
Status: DISCONTINUED | OUTPATIENT
Start: 2025-05-14 | End: 2025-05-16 | Stop reason: HOSPADM

## 2025-05-14 RX ORDER — DEXAMETHASONE SODIUM PHOSPHATE 10 MG/ML
8 INJECTION, SOLUTION INTRAMUSCULAR; INTRAVENOUS ONCE
Status: DISCONTINUED | OUTPATIENT
Start: 2025-05-14 | End: 2025-05-14 | Stop reason: HOSPADM

## 2025-05-14 RX ORDER — SCOPOLAMINE 1 MG/3D
1 PATCH, EXTENDED RELEASE TRANSDERMAL ONCE
Status: DISCONTINUED | OUTPATIENT
Start: 2025-05-14 | End: 2025-05-14 | Stop reason: SDUPTHER

## 2025-05-14 RX ORDER — FENTANYL CITRATE 50 UG/ML
100 INJECTION, SOLUTION INTRAMUSCULAR; INTRAVENOUS ONCE
Status: COMPLETED | OUTPATIENT
Start: 2025-05-14 | End: 2025-05-14

## 2025-05-14 RX ORDER — PROPOFOL 10 MG/ML
INJECTION, EMULSION INTRAVENOUS
Status: DISCONTINUED | OUTPATIENT
Start: 2025-05-14 | End: 2025-05-14 | Stop reason: SDUPTHER

## 2025-05-14 RX ORDER — GABAPENTIN 300 MG/1
300 CAPSULE ORAL 3 TIMES DAILY
Qty: 270 CAPSULE | Refills: 1 | Status: SHIPPED | OUTPATIENT
Start: 2025-05-14 | End: 2025-11-10

## 2025-05-14 RX ORDER — MIDAZOLAM HYDROCHLORIDE 1 MG/ML
2 INJECTION, SOLUTION INTRAMUSCULAR; INTRAVENOUS
Status: DISCONTINUED | OUTPATIENT
Start: 2025-05-14 | End: 2025-05-14 | Stop reason: HOSPADM

## 2025-05-14 RX ORDER — FENTANYL CITRATE 50 UG/ML
INJECTION, SOLUTION INTRAMUSCULAR; INTRAVENOUS
Status: DISCONTINUED | OUTPATIENT
Start: 2025-05-14 | End: 2025-05-14 | Stop reason: SDUPTHER

## 2025-05-14 RX ORDER — ONDANSETRON 4 MG/1
4 TABLET, ORALLY DISINTEGRATING ORAL EVERY 8 HOURS PRN
Status: DISCONTINUED | OUTPATIENT
Start: 2025-05-14 | End: 2025-05-16 | Stop reason: HOSPADM

## 2025-05-14 RX ORDER — ATENOLOL 25 MG/1
25 TABLET ORAL EVERY MORNING
Status: DISCONTINUED | OUTPATIENT
Start: 2025-05-15 | End: 2025-05-16 | Stop reason: HOSPADM

## 2025-05-14 RX ORDER — VANCOMYCIN HYDROCHLORIDE 1 G/20ML
INJECTION, POWDER, LYOPHILIZED, FOR SOLUTION INTRAVENOUS PRN
Status: DISCONTINUED | OUTPATIENT
Start: 2025-05-14 | End: 2025-05-14 | Stop reason: HOSPADM

## 2025-05-14 RX ORDER — HYDROCHLOROTHIAZIDE 12.5 MG/1
12.5 TABLET ORAL EVERY EVENING
Status: DISCONTINUED | OUTPATIENT
Start: 2025-05-14 | End: 2025-05-14

## 2025-05-14 RX ORDER — OXYCODONE HYDROCHLORIDE 5 MG/1
10 TABLET ORAL EVERY 4 HOURS PRN
Status: DISCONTINUED | OUTPATIENT
Start: 2025-05-14 | End: 2025-05-16 | Stop reason: HOSPADM

## 2025-05-14 RX ORDER — MELOXICAM 7.5 MG/1
3.75 TABLET ORAL DAILY
Status: COMPLETED | OUTPATIENT
Start: 2025-05-14 | End: 2025-05-16

## 2025-05-14 RX ORDER — SODIUM CHLORIDE 0.9 % (FLUSH) 0.9 %
5-40 SYRINGE (ML) INJECTION PRN
Status: DISCONTINUED | OUTPATIENT
Start: 2025-05-14 | End: 2025-05-14 | Stop reason: HOSPADM

## 2025-05-14 RX ORDER — SODIUM CHLORIDE, SODIUM LACTATE, POTASSIUM CHLORIDE, CALCIUM CHLORIDE 600; 310; 30; 20 MG/100ML; MG/100ML; MG/100ML; MG/100ML
INJECTION, SOLUTION INTRAVENOUS CONTINUOUS
Status: DISCONTINUED | OUTPATIENT
Start: 2025-05-14 | End: 2025-05-14 | Stop reason: HOSPADM

## 2025-05-14 RX ORDER — ACETAMINOPHEN 325 MG/1
650 TABLET ORAL EVERY 6 HOURS
Status: DISCONTINUED | OUTPATIENT
Start: 2025-05-15 | End: 2025-05-16 | Stop reason: HOSPADM

## 2025-05-14 RX ORDER — SODIUM CHLORIDE 0.9 % (FLUSH) 0.9 %
5-40 SYRINGE (ML) INJECTION EVERY 12 HOURS SCHEDULED
Status: DISCONTINUED | OUTPATIENT
Start: 2025-05-14 | End: 2025-05-14 | Stop reason: HOSPADM

## 2025-05-14 RX ORDER — SODIUM CHLORIDE 9 MG/ML
INJECTION, SOLUTION INTRAVENOUS PRN
Status: DISCONTINUED | OUTPATIENT
Start: 2025-05-14 | End: 2025-05-14 | Stop reason: HOSPADM

## 2025-05-14 RX ORDER — DEXAMETHASONE SODIUM PHOSPHATE 10 MG/ML
INJECTION, SOLUTION INTRAMUSCULAR; INTRAVENOUS
Status: COMPLETED | OUTPATIENT
Start: 2025-05-14 | End: 2025-05-14

## 2025-05-14 RX ORDER — MEPERIDINE HYDROCHLORIDE 25 MG/ML
12.5 INJECTION INTRAMUSCULAR; INTRAVENOUS; SUBCUTANEOUS EVERY 5 MIN PRN
Status: DISCONTINUED | OUTPATIENT
Start: 2025-05-14 | End: 2025-05-14 | Stop reason: HOSPADM

## 2025-05-14 RX ORDER — FENTANYL CITRATE 50 UG/ML
INJECTION, SOLUTION INTRAMUSCULAR; INTRAVENOUS
Status: COMPLETED
Start: 2025-05-14 | End: 2025-05-14

## 2025-05-14 RX ORDER — FENTANYL CITRATE 50 UG/ML
INJECTION, SOLUTION INTRAMUSCULAR; INTRAVENOUS
Status: COMPLETED | OUTPATIENT
Start: 2025-05-14 | End: 2025-05-14

## 2025-05-14 RX ORDER — BUPIVACAINE HYDROCHLORIDE 7.5 MG/ML
INJECTION, SOLUTION INTRASPINAL
Status: COMPLETED | OUTPATIENT
Start: 2025-05-14 | End: 2025-05-14

## 2025-05-14 RX ORDER — SODIUM CHLORIDE 9 MG/ML
INJECTION, SOLUTION INTRAVENOUS PRN
Status: DISCONTINUED | OUTPATIENT
Start: 2025-05-14 | End: 2025-05-16 | Stop reason: HOSPADM

## 2025-05-14 RX ORDER — SODIUM CHLORIDE 0.9 % (FLUSH) 0.9 %
5-40 SYRINGE (ML) INJECTION PRN
Status: DISCONTINUED | OUTPATIENT
Start: 2025-05-14 | End: 2025-05-16 | Stop reason: HOSPADM

## 2025-05-14 RX ORDER — LOSARTAN POTASSIUM 100 MG/1
100 TABLET ORAL DAILY
Status: DISCONTINUED | OUTPATIENT
Start: 2025-05-14 | End: 2025-05-15

## 2025-05-14 RX ORDER — ROPIVACAINE HYDROCHLORIDE 5 MG/ML
INJECTION, SOLUTION EPIDURAL; INFILTRATION; PERINEURAL
Status: COMPLETED | OUTPATIENT
Start: 2025-05-14 | End: 2025-05-14

## 2025-05-14 RX ORDER — CEPHALEXIN 500 MG/1
500 CAPSULE ORAL 3 TIMES DAILY
Qty: 21 CAPSULE | Refills: 0 | Status: SHIPPED | OUTPATIENT
Start: 2025-05-14 | End: 2025-05-21

## 2025-05-14 RX ORDER — ACETAMINOPHEN 500 MG
1000 TABLET ORAL ONCE
Status: COMPLETED | OUTPATIENT
Start: 2025-05-14 | End: 2025-05-14

## 2025-05-14 RX ORDER — AMLODIPINE BESYLATE 5 MG/1
5 TABLET ORAL EVERY MORNING
Status: DISCONTINUED | OUTPATIENT
Start: 2025-05-15 | End: 2025-05-15

## 2025-05-14 RX ORDER — DROPERIDOL 2.5 MG/ML
0.62 INJECTION, SOLUTION INTRAMUSCULAR; INTRAVENOUS
Status: DISCONTINUED | OUTPATIENT
Start: 2025-05-14 | End: 2025-05-14 | Stop reason: HOSPADM

## 2025-05-14 RX ORDER — ROSUVASTATIN CALCIUM 20 MG/1
20 TABLET, COATED ORAL NIGHTLY
Status: DISCONTINUED | OUTPATIENT
Start: 2025-05-14 | End: 2025-05-16 | Stop reason: HOSPADM

## 2025-05-14 RX ORDER — DEXTROSE MONOHYDRATE AND SODIUM CHLORIDE 5; .45 G/100ML; G/100ML
INJECTION, SOLUTION INTRAVENOUS CONTINUOUS
Status: DISCONTINUED | OUTPATIENT
Start: 2025-05-14 | End: 2025-05-15 | Stop reason: ALTCHOICE

## 2025-05-14 RX ORDER — MORPHINE SULFATE 2 MG/ML
2 INJECTION, SOLUTION INTRAMUSCULAR; INTRAVENOUS
Status: DISCONTINUED | OUTPATIENT
Start: 2025-05-14 | End: 2025-05-15

## 2025-05-14 RX ORDER — SODIUM CHLORIDE 0.9 % (FLUSH) 0.9 %
5-40 SYRINGE (ML) INJECTION EVERY 12 HOURS SCHEDULED
Status: DISCONTINUED | OUTPATIENT
Start: 2025-05-14 | End: 2025-05-16 | Stop reason: HOSPADM

## 2025-05-14 RX ORDER — DIPHENHYDRAMINE HYDROCHLORIDE 50 MG/ML
12.5 INJECTION, SOLUTION INTRAMUSCULAR; INTRAVENOUS
Status: DISCONTINUED | OUTPATIENT
Start: 2025-05-14 | End: 2025-05-14 | Stop reason: HOSPADM

## 2025-05-14 RX ORDER — EPHEDRINE SULFATE/0.9% NACL/PF 25 MG/5 ML
SYRINGE (ML) INTRAVENOUS
Status: DISCONTINUED | OUTPATIENT
Start: 2025-05-14 | End: 2025-05-14 | Stop reason: SDUPTHER

## 2025-05-14 RX ORDER — NALOXONE HYDROCHLORIDE 0.4 MG/ML
INJECTION, SOLUTION INTRAMUSCULAR; INTRAVENOUS; SUBCUTANEOUS PRN
Status: DISCONTINUED | OUTPATIENT
Start: 2025-05-14 | End: 2025-05-14 | Stop reason: HOSPADM

## 2025-05-14 RX ADMIN — MIDAZOLAM HYDROCHLORIDE 2 MG: 1 INJECTION, SOLUTION INTRAMUSCULAR; INTRAVENOUS at 07:50

## 2025-05-14 RX ADMIN — ACETAMINOPHEN 650 MG: 325 TABLET ORAL at 23:52

## 2025-05-14 RX ADMIN — Medication 200 MCG: at 09:52

## 2025-05-14 RX ADMIN — SODIUM CHLORIDE, POTASSIUM CHLORIDE, SODIUM LACTATE AND CALCIUM CHLORIDE: 600; 310; 30; 20 INJECTION, SOLUTION INTRAVENOUS at 10:05

## 2025-05-14 RX ADMIN — EPHEDRINE SULFATE 10 MG: 5 INJECTION INTRAVENOUS at 10:12

## 2025-05-14 RX ADMIN — FENTANYL CITRATE 100 MCG: 50 INJECTION INTRAMUSCULAR; INTRAVENOUS at 07:51

## 2025-05-14 RX ADMIN — MIDAZOLAM 2 MG: 1 INJECTION INTRAMUSCULAR; INTRAVENOUS at 07:50

## 2025-05-14 RX ADMIN — FENTANYL CITRATE 100 MCG: 50 INJECTION, SOLUTION INTRAMUSCULAR; INTRAVENOUS at 07:51

## 2025-05-14 RX ADMIN — EPHEDRINE SULFATE 5 MG: 5 INJECTION INTRAVENOUS at 10:18

## 2025-05-14 RX ADMIN — PROPOFOL INJECTABLE EMULSION 80 MCG/KG/MIN: 10 INJECTION, EMULSION INTRAVENOUS at 08:25

## 2025-05-14 RX ADMIN — Medication 200 MCG: at 08:43

## 2025-05-14 RX ADMIN — FENTANYL CITRATE 50 MCG: 50 INJECTION, SOLUTION INTRAMUSCULAR; INTRAVENOUS at 08:25

## 2025-05-14 RX ADMIN — WATER 2000 MG: 1 INJECTION INTRAMUSCULAR; INTRAVENOUS; SUBCUTANEOUS at 16:53

## 2025-05-14 RX ADMIN — CEFAZOLIN SODIUM 2000 MG: 1 POWDER, FOR SOLUTION INTRAMUSCULAR; INTRAVENOUS at 08:31

## 2025-05-14 RX ADMIN — DEXTROSE AND SODIUM CHLORIDE: 5; 450 INJECTION, SOLUTION INTRAVENOUS at 14:46

## 2025-05-14 RX ADMIN — OXYCODONE 10 MG: 5 TABLET ORAL at 19:06

## 2025-05-14 RX ADMIN — Medication 200 MCG: at 08:35

## 2025-05-14 RX ADMIN — CELECOXIB 100 MG: 100 CAPSULE ORAL at 06:44

## 2025-05-14 RX ADMIN — Medication 200 MCG: at 09:03

## 2025-05-14 RX ADMIN — MIDAZOLAM 2 MG: 1 INJECTION INTRAMUSCULAR; INTRAVENOUS at 07:55

## 2025-05-14 RX ADMIN — DEXTROSE AND SODIUM CHLORIDE: 5; 450 INJECTION, SOLUTION INTRAVENOUS at 23:53

## 2025-05-14 RX ADMIN — ACETAMINOPHEN 1000 MG: 500 TABLET ORAL at 06:44

## 2025-05-14 RX ADMIN — LOSARTAN POTASSIUM 100 MG: 100 TABLET, FILM COATED ORAL at 14:59

## 2025-05-14 RX ADMIN — OXYCODONE 10 MG: 5 TABLET ORAL at 14:59

## 2025-05-14 RX ADMIN — WATER 2000 MG: 1 INJECTION INTRAMUSCULAR; INTRAVENOUS; SUBCUTANEOUS at 23:52

## 2025-05-14 RX ADMIN — ENOXAPARIN SODIUM 40 MG: 100 INJECTION SUBCUTANEOUS at 18:13

## 2025-05-14 RX ADMIN — EPHEDRINE SULFATE 10 MG: 5 INJECTION INTRAVENOUS at 10:07

## 2025-05-14 RX ADMIN — SODIUM CHLORIDE, POTASSIUM CHLORIDE, SODIUM LACTATE AND CALCIUM CHLORIDE: 600; 310; 30; 20 INJECTION, SOLUTION INTRAVENOUS at 08:54

## 2025-05-14 RX ADMIN — OXYCODONE 10 MG: 5 TABLET ORAL at 23:52

## 2025-05-14 RX ADMIN — DEXAMETHASONE SODIUM PHOSPHATE 10 MG: 10 INJECTION, SOLUTION INTRAMUSCULAR; INTRAVENOUS at 07:50

## 2025-05-14 RX ADMIN — MIDAZOLAM HYDROCHLORIDE 2 MG: 1 INJECTION, SOLUTION INTRAMUSCULAR; INTRAVENOUS at 07:55

## 2025-05-14 RX ADMIN — BUPIVACAINE HYDROCHLORIDE IN DEXTROSE 15 MG: 7.5 INJECTION, SOLUTION SUBARACHNOID at 08:25

## 2025-05-14 RX ADMIN — ROSUVASTATIN CALCIUM 20 MG: 20 TABLET, FILM COATED ORAL at 20:41

## 2025-05-14 RX ADMIN — MELOXICAM 3.75 MG: 7.5 TABLET ORAL at 14:59

## 2025-05-14 RX ADMIN — SODIUM CHLORIDE, POTASSIUM CHLORIDE, SODIUM LACTATE AND CALCIUM CHLORIDE: 600; 310; 30; 20 INJECTION, SOLUTION INTRAVENOUS at 06:45

## 2025-05-14 RX ADMIN — FENTANYL CITRATE 50 MCG: 50 INJECTION, SOLUTION INTRAMUSCULAR; INTRAVENOUS at 08:16

## 2025-05-14 RX ADMIN — SODIUM CHLORIDE, POTASSIUM CHLORIDE, SODIUM LACTATE AND CALCIUM CHLORIDE: 600; 310; 30; 20 INJECTION, SOLUTION INTRAVENOUS at 09:26

## 2025-05-14 RX ADMIN — ROPIVACAINE HYDROCHLORIDE 25 ML: 5 INJECTION, SOLUTION EPIDURAL; INFILTRATION; PERINEURAL at 07:50

## 2025-05-14 RX ADMIN — Medication 200 MCG: at 10:01

## 2025-05-14 ASSESSMENT — PAIN DESCRIPTION - ORIENTATION
ORIENTATION: RIGHT
ORIENTATION: RIGHT

## 2025-05-14 ASSESSMENT — PAIN DESCRIPTION - DESCRIPTORS
DESCRIPTORS: DISCOMFORT;SORE
DESCRIPTORS: DISCOMFORT;SORE

## 2025-05-14 ASSESSMENT — PAIN SCALES - GENERAL
PAINLEVEL_OUTOF10: 0
PAINLEVEL_OUTOF10: 5
PAINLEVEL_OUTOF10: 0
PAINLEVEL_OUTOF10: 0
PAINLEVEL_OUTOF10: 6
PAINLEVEL_OUTOF10: 4
PAINLEVEL_OUTOF10: 0
PAINLEVEL_OUTOF10: 7

## 2025-05-14 ASSESSMENT — PAIN - FUNCTIONAL ASSESSMENT: PAIN_FUNCTIONAL_ASSESSMENT: 0-10

## 2025-05-14 ASSESSMENT — PAIN DESCRIPTION - LOCATION
LOCATION: KNEE
LOCATION: KNEE

## 2025-05-14 ASSESSMENT — PAIN SCALES - WONG BAKER: WONGBAKER_NUMERICALRESPONSE: NO HURT

## 2025-05-14 NOTE — CARE COORDINATION
Case Management Assessment  Initial Evaluation    Date/Time of Evaluation: 5/14/2025 1:59 PM  Assessment Completed by: STELLA Priest    If patient is discharged prior to next notation, then this note serves as note for discharge by case management.    Patient Name: Shaheed Clayton                   YOB: 1960  Diagnosis: Right knee DJD [M17.11]  Post-operative state [Z98.890]                   Date / Time: 5/14/2025  6:03 AM    Patient Admission Status: Inpatient   Readmission Risk (Low < 19, Mod (19-27), High > 27): Readmission Risk Score: 6.3    Current PCP: Aster West MD  PCP verified by CM? Yes    Chart Reviewed: Yes      History Provided by: Patient  Patient Orientation: Alert and Oriented    Patient Cognition: Alert    Hospitalization in the last 30 days (Readmission):  No    If yes, Readmission Assessment in CM Navigator will be completed.    Advance Directives:      Code Status: Full Code   Patient's Primary Decision Maker is: Legal Next of Kin    Primary Decision Maker: Shahrzad Clayton - Spouse - [phone]    Secondary Decision Maker: CODIE LUIS - Other - [phone]    Discharge Planning:    Patient lives with: Spouse/Significant Other Type of Home: House  Primary Care Giver: Self  Patient Support Systems include: Spouse/Significant Other, Children   Current Financial resources:    Current community resources:    Current services prior to admission: None            Current DME:              Type of Home Care services:  None    ADLS  Prior functional level: Independent in ADLs/IADLs  Current functional level: Independent in ADLs/IADLs, Assistance with the following:, Mobility    PT AM-PAC:   /24  OT AM-PAC:   /24    Family can provide assistance at DC: Yes  Would you like Case Management to discuss the discharge plan with any other family members/significant others, and if so, who? No  Plans to Return to Present Housing: Yes  Other Identified Issues/Barriers to RETURNING to

## 2025-05-14 NOTE — ANESTHESIA PROCEDURE NOTES
Peripheral Block    Patient location during procedure: PACU  Reason for block: post-op pain management and at surgeon's request  Start time: 5/14/2025 7:50 AM  End time: 5/14/2025 7:57 AM  Staffing  Performed: anesthesiologist   Anesthesiologist: Jorge A Olivares DO  Performed by: Jorge A Olivares DO  Authorized by: Jorge A Olivares DO    Preanesthetic Checklist  Completed: patient identified, IV checked, site marked, risks and benefits discussed, surgical/procedural consents, equipment checked, pre-op evaluation, timeout performed, anesthesia consent given, oxygen available, monitors applied/VS acknowledged, fire risk safety assessment completed and verbalized and blood product R/B/A discussed and consented  Peripheral Block   Patient position: supine  Provider prep: mask and sterile gloves  Patient monitoring: cardiac monitor, continuous pulse ox, frequent blood pressure checks, IV access, oxygen and responsive to questions  Block type: Femoral  Adductor canal  Laterality: right  Injection technique: single-shot  Guidance: ultrasound guided  Infiltration strength: 1 %  Dose: 3 mL    Needle   Needle type: insulated echogenic nerve stimulator needle   Needle gauge: 20 G  Needle localization: ultrasound guidance  Needle length: 10 cm  Assessment   Injection assessment: negative aspiration for heme, no paresthesia on injection, local visualized surrounding nerve on ultrasound and no intravascular symptoms  Paresthesia pain: none  Slow fractionated injection: yes  Hemodynamics: stable  Outcomes: uncomplicated and patient tolerated procedure well    Medications Administered  dexAMETHasone (DECADRON) (PF) 10 mg/mL injection - Other, Thigh Left   10 mg - 5/14/2025 7:50:00 AM  ropivacaine (NAROPIN) injection 0.5% - Perineural, Thigh Left   25 mL - 5/14/2025 7:50:00 AM

## 2025-05-14 NOTE — H&P
Updated H&P    Chief Complaint   Patient presents with    Knee Pain       Right Knee Arthroscopy DOS 01/13/2025         Shaheed Clayton returns today for follow-up of his right knee pain. he reports this is worse than when I saw him last.  The patient's pain level is a 10/10. The previous treatment was not successful. The patient has failed all attempts at conservative treatment including: cortisone injection, visco injections, zilretta injections, physician directed HEP, nsaids, pain medication, OTC medication, ice, heat, use of assistive device and activity restriction. He had right knee arthroscopy done 1/13/25.     Past Medical History        Past Medical History:   Diagnosis Date    Alcohol abuse       drinks 8-10 beers daily    BPH with elevated PSA      CAD (coronary artery disease)       one stent    Cancer (HCC) 08/01/2021     Grade 1 prostrate cancer   (no intervention)    Chest pain 01/26/2011     suggestive of unstable angina    COVID-19 05/2022     flu  symptoms    DJD (degenerative joint disease)       right knee     Gastritis 02/2010     on EGD    GERD (gastroesophageal reflux disease)      H/O exercise stress test 01/26/2011     Treadmill nuclear stress test: Srinivasan protocol. 8 min, 30 sec. 95% max predicted heart rate. Physiologic BP response. Patient developed chest pain at 7 1/2 min into exercise. Had around 2 mm ST segment depressions. Nuclear images read as showing no evidence of stress-induced ischemia    H/O exercise stress test 09/09/2011     Treadmill nuclear stress test: Srinivasan protocol. 9 min and 30 sec. 101% of max predicted heart rate. Hypertensive BP response. No CP. No ischemic EKG changes. Nuclear images showed soft tissue attenuation artifacts. There did not appear to be any evidence of stress-induced ischemia on study. Gated views did not show any regional wall motion abnormality with possible hyperdynamic LVSF, EF 77%     Hx of cardiovascular stress test 10/29/2019     Nuclear

## 2025-05-14 NOTE — CARE COORDINATION
5/14/2025: SS/PAT NOTE:  SS Consult for post-op d/c planning and [facility] orders noted, d/c plan for pt to return home with [facility] by Daniella Garcia agency liaison notified of orders in Epic and accepted pt for SOC tomorrow 5/15 and [facility] delivered pt's w/w to [facility] for pt to take home prior to discharge, pt's wife to transport pt home, [facility] notified. Electronically signed by STELLA Priest on 5/14/2025 at 12:34 PM

## 2025-05-14 NOTE — ANESTHESIA POSTPROCEDURE EVALUATION
Department of Anesthesiology  Postprocedure Note    Patient: Shaheed Clayton  MRN: [id number]  YOB: 1960  Date of evaluation: 5/14/2025    Procedure Summary       Date: 05/14/25 Room / Location: [address]    Anesthesia Start: 0816 Anesthesia Stop: 1029    Procedure: RIGHT KNEE TOTAL KNEE ARTHROPLASTY-5/14/25 HENRY TATO (Right: Knee) Diagnosis:       Right knee DJD      (Right knee DJD [M17.11])    Surgeons: Huber Christiansen DO Responsible Provider: Jorge A Olivares DO    Anesthesia Type: spinal ASA Status: 3            Anesthesia Type: No value filed.    Stefano Phase I: Stefano Score: 10    Stefano Phase II:      Anesthesia Post Evaluation    Patient location during evaluation: PACU  Patient participation: complete - patient participated  Level of consciousness: awake and alert  Pain score: 0  Airway patency: patent  Nausea & Vomiting: no nausea and no vomiting  Cardiovascular status: blood pressure returned to baseline and hemodynamically stable  Respiratory status: acceptable  Hydration status: stable  Pain management: adequate    No notable events documented.

## 2025-05-14 NOTE — ANESTHESIA PROCEDURE NOTES
Spinal Block    End time: 5/14/2025 8:25 AM  Reason for block: primary anesthetic  Staffing  Anesthesiologist: Jorge A Olivares DO  Resident/CRNA: Anderson Iverson APRN - CRNA  Other anesthesia staff: Cecil Dailey RN  Performed by: Cceil Dailey RN  Authorized by: Jorge A Olivares DO    Spinal Block  Patient position: sitting  Prep: ChloraPrep  Patient monitoring: cardiac monitor, continuous pulse ox and frequent blood pressure checks  Approach: midline  Location: L3/L4  Provider prep: mask and sterile gloves  Needle  Needle type: Quincke   Needle gauge: 22 G  Needle length: 4 in  Assessment  Sensory level: T6  Events: cerebrospinal fluid  Swirl obtained: Yes  CSF: clear  Attempts: 2  Hemodynamics: stable  Preanesthetic Checklist  Completed: patient identified, IV checked, site marked, risks and benefits discussed, surgical/procedural consents, equipment checked, pre-op evaluation, timeout performed, anesthesia consent given, oxygen available, monitors applied/VS acknowledged, fire risk safety assessment completed and verbalized and blood product R/B/A discussed and consented

## 2025-05-14 NOTE — DISCHARGE INSTRUCTIONS
[facility] Department of Orthopedics   [facility] [address]    MD Zander Vicente DO K Brian Williams, DO    Orthopaedics Discharge Instructions   WBAT on right lower extremity  Pain medication Per Prescriptions  Contact Office for Medication Refill- [phone]  Office can refill pain med every 7 days  If patient discharging to facility then pain control will be continued per facility physician  Ice to operative/injured site for 15-30 minutes of each hour for next 5 days    Recommend that you continue to ice the area 2-3 times per day after this   Elevate operative/injured limb on 2 pillows at home  Goal is to have limb above the heart if able  Wound care -dressings remain clean dry intact.  On postoperative day 7 can be removed and surgical site can be clean with soapy water.  Fracture Care -  If your splint/cast becomes too tight, too loose, wet or damaged please contact our office right away we will need to change out the splint/cast.    DVT prophylaxis, ASA 81 mg BID.    Follow Up in Office in 2 weeks.     Call the office at [phone] for directions or with any questions.  Watch for these significant complications.  Call physician if they or any other problems occur:  Fever over 101°, redness, swelling or warmth at the operative site  Unrelieved nausea    Foul smelling or cloudy drainage at the operative site   Unrelieved pain    Blood soaked dressing. (Some oozing may be normal)     Numb, pale, blue, cold or tingling extremity     Your information:  Name: Shaheed Clayton  : 1960    Your instructions:  Discharge Home with [facility]    What to do after you leave the hospital:    Recommended diet: regular diet    The following personal items were collected during your admission and were returned to you:    Belongings  Dental Appliances: None  Vision - Corrective Lenses: Eyeglasses  Hearing Aid: None  Clothing: Shorts, Undergarments,

## 2025-05-14 NOTE — CONSULTS
[facility]   Consult for Medical Management      Reason for Consult:  Medical management    History of Present Illness:  65 y.o. male with a history of coronary artery disease status post stent in the LAD in January 2011, osteoarthritis, prostate CA, hypertension, hyperlipidemia, hypothyroidism , presented to the emergency room for right knee total arthroplasty patient underwent the procedure and did well tolerated well with no complications.  He started on a diet and tolerated well no nausea or vomiting no chest pain.    Informant(s) for H&P: pt and EMR     REVIEW OF SYSTEMS:  Complete ROS performed with patient, pertinent positives and negatives are listed in the HPI.    PMH:  Past Medical History:   Diagnosis Date    Alcohol abuse     drinks 8-10 beers daily    BPH with elevated PSA     CAD (coronary artery disease)     one stent    Cancer (HCC) 08/01/2021    Grade 1 prostrate cancer   (no intervention)    Chest pain 01/26/2011    suggestive of unstable angina    COVID-19 05/2022    flu  symptoms    DJD (degenerative joint disease)     right knee     Gastritis 02/2010    on EGD    GERD (gastroesophageal reflux disease)     H/O exercise stress test 01/26/2011    Treadmill nuclear stress test: Srinivasan protocol. 8 min, 30 sec. 95% max predicted heart rate. Physiologic BP response. Patient developed chest pain at 7 1/2 min into exercise. Had around 2 mm ST segment depressions. Nuclear images read as showing no evidence of stress-induced ischemia    H/O exercise stress test 09/09/2011    Treadmill nuclear stress test: Srinivasan protocol. 9 min and 30 sec. 101% of max predicted heart rate. Hypertensive BP response. No CP. No ischemic EKG changes. Nuclear images showed soft tissue attenuation artifacts. There did not appear to be any evidence of stress-induced ischemia on study. Gated views did not show any regional wall motion abnormality with possible hyperdynamic LVSF, EF 77%

## 2025-05-14 NOTE — OP NOTE
Operative Note      Patient: Shaheed Clayton  YOB: 1960  MRN: [id number]    Date of Procedure: 5/14/2025    Pre-Op Diagnosis Codes:      * Right knee DJD [M17.11]    Post-Op Diagnosis: Same       Procedure(s):  RIGHT KNEE TOTAL KNEE ARTHROPLASTY-5/14/25 HENRY GODWIN    Surgeon(s):  Huber Christiansen DO    Assistant:   Resident: Joseph Escalante DO; Sunday Doty DO    Anesthesia: Spinal    Estimated Blood Loss (mL): less than 100     Complications: None    Specimens:   ID Type Source Tests Collected by Time Destination   A : Bone right knee Tissue Tissue SURGICAL PATHOLOGY Huber Christiansen DO 5/14/2025 0908        Implants:  Implant Name Type Inv. Item Serial No.  Lot No. LRB No. Used Action   BASEPLATE TIB SZ 7 AP56MM ML80MM KNEE TRITANIUM 4 CRUCFRM - [id number]  BASEPLATE TIB SZ 7 AP56MM ML80MM KNEE TRITANIUM 4 CRUCFRM  [employer] [id number] Right 1 Implanted   COMPONENT FEM SZ 7 R KNEE CRUCE RET CEMENTLESS BEAD W/ RICK - [id number]  COMPONENT FEM SZ 7 R KNEE CRUCE RET CEMENTLESS BEAD W/ RICK  [employer] [id number] Right 1 Implanted   INSERT TIB CNDYL STBL 7 9 MM KNEE 5/PK X3 TRIATHLON - [id number]  INSERT TIB CNDYL STBL 7 9 MM KNEE 5/PK X3 TRIATHLON  [employer] [id number] Right 1 Implanted   COMPONENT PAT [id number] THK9MM KNEE TRITANIUM MTL BK - [id number]  COMPONENT PAT [id number] THK9MM KNEE TRITANIUM MTL BK  [employer] [id number] Right 1 Implanted         Drains: * No LDAs found *    Findings:  Infection Present At Time Of Surgery (PATOS) (choose all levels that have infection present):  No infection present  Other Findings: as above    Detailed Description of Procedure:   below      Brief Hospital Course:  Shaheed Clayton is a patient known to Huber Christiansen DO's practice with persistent complaints of Right knee pain. Knee pain has failed to be relieved by non-operative conservative measures, and has began affecting daily

## 2025-05-15 ENCOUNTER — APPOINTMENT (OUTPATIENT)
Dept: GENERAL RADIOLOGY | Age: 65
DRG: 470 | End: 2025-05-15
Attending: ORTHOPAEDIC SURGERY
Payer: MEDICARE

## 2025-05-15 LAB
ALBUMIN SERPL-MCNC: 3.8 G/DL (ref 3.5–5.2)
ALP SERPL-CCNC: 45 U/L (ref 40–129)
ALT SERPL-CCNC: 27 U/L (ref 0–40)
ANION GAP SERPL CALCULATED.3IONS-SCNC: 7 MMOL/L (ref 7–16)
AST SERPL-CCNC: 24 U/L (ref 0–39)
BASOPHILS # BLD: 0.01 K/UL (ref 0–0.2)
BASOPHILS NFR BLD: 0 % (ref 0–2)
BILIRUB SERPL-MCNC: 0.5 MG/DL (ref 0–1.2)
BUN SERPL-MCNC: 7 MG/DL (ref 6–23)
CALCIUM SERPL-MCNC: 8.6 MG/DL (ref 8.6–10.2)
CHLORIDE SERPL-SCNC: 106 MMOL/L (ref 98–107)
CO2 SERPL-SCNC: 25 MMOL/L (ref 22–29)
CREAT SERPL-MCNC: 0.8 MG/DL (ref 0.7–1.2)
EKG ATRIAL RATE: 63 BPM
EKG P AXIS: 41 DEGREES
EKG P-R INTERVAL: 82 MS
EKG Q-T INTERVAL: 406 MS
EKG QRS DURATION: 102 MS
EKG QTC CALCULATION (BAZETT): 415 MS
EKG R AXIS: 15 DEGREES
EKG T AXIS: 14 DEGREES
EKG VENTRICULAR RATE: 63 BPM
EOSINOPHIL # BLD: 0 K/UL (ref 0.05–0.5)
EOSINOPHILS RELATIVE PERCENT: 0 % (ref 0–6)
ERYTHROCYTE [DISTWIDTH] IN BLOOD BY AUTOMATED COUNT: 13.6 % (ref 11.5–15)
ERYTHROCYTE [DISTWIDTH] IN BLOOD BY AUTOMATED COUNT: 13.9 % (ref 11.5–15)
GFR, ESTIMATED: >90 ML/MIN/1.73M2
GLUCOSE BLD-MCNC: 150 MG/DL (ref 74–99)
GLUCOSE SERPL-MCNC: 158 MG/DL (ref 74–99)
HCT VFR BLD AUTO: 31.4 % (ref 37–54)
HCT VFR BLD AUTO: 32 % (ref 37–54)
HGB BLD-MCNC: 11 G/DL (ref 12.5–16.5)
HGB BLD-MCNC: 11.3 G/DL (ref 12.5–16.5)
IMM GRANULOCYTES # BLD AUTO: 0.06 K/UL (ref 0–0.58)
IMM GRANULOCYTES NFR BLD: 1 % (ref 0–5)
LYMPHOCYTES NFR BLD: 0.72 K/UL (ref 1.5–4)
LYMPHOCYTES RELATIVE PERCENT: 6 % (ref 20–42)
MCH RBC QN AUTO: 33.5 PG (ref 26–35)
MCH RBC QN AUTO: 33.7 PG (ref 26–35)
MCHC RBC AUTO-ENTMCNC: 35 G/DL (ref 32–34.5)
MCHC RBC AUTO-ENTMCNC: 35.3 G/DL (ref 32–34.5)
MCV RBC AUTO: 95 FL (ref 80–99.9)
MCV RBC AUTO: 96.3 FL (ref 80–99.9)
MONOCYTES NFR BLD: 0.94 K/UL (ref 0.1–0.95)
MONOCYTES NFR BLD: 8 % (ref 2–12)
NEUTROPHILS NFR BLD: 85 % (ref 43–80)
NEUTS SEG NFR BLD: 9.83 K/UL (ref 1.8–7.3)
PLATELET, FLUORESCENCE: 108 K/UL (ref 130–450)
PLATELET, FLUORESCENCE: 112 K/UL (ref 130–450)
PMV BLD AUTO: 11 FL (ref 7–12)
PMV BLD AUTO: 11.3 FL (ref 7–12)
POTASSIUM SERPL-SCNC: 3.9 MMOL/L (ref 3.5–5)
PROT SERPL-MCNC: 5.3 G/DL (ref 6.4–8.3)
RBC # BLD AUTO: 3.26 M/UL (ref 3.8–5.8)
RBC # BLD AUTO: 3.37 M/UL (ref 3.8–5.8)
SODIUM SERPL-SCNC: 138 MMOL/L (ref 132–146)
WBC OTHER # BLD: 10.2 K/UL (ref 4.5–11.5)
WBC OTHER # BLD: 11.6 K/UL (ref 4.5–11.5)

## 2025-05-15 PROCEDURE — 36415 COLL VENOUS BLD VENIPUNCTURE: CPT

## 2025-05-15 PROCEDURE — 6360000002 HC RX W HCPCS: Performed by: INTERNAL MEDICINE

## 2025-05-15 PROCEDURE — 93005 ELECTROCARDIOGRAM TRACING: CPT | Performed by: INTERNAL MEDICINE

## 2025-05-15 PROCEDURE — 71045 X-RAY EXAM CHEST 1 VIEW: CPT

## 2025-05-15 PROCEDURE — 93010 ELECTROCARDIOGRAM REPORT: CPT | Performed by: INTERNAL MEDICINE

## 2025-05-15 PROCEDURE — 80053 COMPREHEN METABOLIC PANEL: CPT

## 2025-05-15 PROCEDURE — 2500000003 HC RX 250 WO HCPCS: Performed by: ORTHOPAEDIC SURGERY

## 2025-05-15 PROCEDURE — 85025 COMPLETE CBC W/AUTO DIFF WBC: CPT

## 2025-05-15 PROCEDURE — 97530 THERAPEUTIC ACTIVITIES: CPT

## 2025-05-15 PROCEDURE — 6370000000 HC RX 637 (ALT 250 FOR IP): Performed by: INTERNAL MEDICINE

## 2025-05-15 PROCEDURE — 85027 COMPLETE CBC AUTOMATED: CPT

## 2025-05-15 PROCEDURE — 6360000002 HC RX W HCPCS

## 2025-05-15 PROCEDURE — 6370000000 HC RX 637 (ALT 250 FOR IP)

## 2025-05-15 PROCEDURE — 99232 SBSQ HOSP IP/OBS MODERATE 35: CPT | Performed by: INTERNAL MEDICINE

## 2025-05-15 PROCEDURE — 97165 OT EVAL LOW COMPLEX 30 MIN: CPT

## 2025-05-15 PROCEDURE — 82962 GLUCOSE BLOOD TEST: CPT

## 2025-05-15 PROCEDURE — 6370000000 HC RX 637 (ALT 250 FOR IP): Performed by: ORTHOPAEDIC SURGERY

## 2025-05-15 PROCEDURE — 97116 GAIT TRAINING THERAPY: CPT

## 2025-05-15 PROCEDURE — 97535 SELF CARE MNGMENT TRAINING: CPT

## 2025-05-15 PROCEDURE — 1200000000 HC SEMI PRIVATE

## 2025-05-15 RX ORDER — LOSARTAN POTASSIUM 100 MG/1
100 TABLET ORAL DAILY
Status: DISCONTINUED | OUTPATIENT
Start: 2025-05-16 | End: 2025-05-16 | Stop reason: HOSPADM

## 2025-05-15 RX ORDER — DIPHENHYDRAMINE HYDROCHLORIDE 50 MG/ML
25 INJECTION, SOLUTION INTRAMUSCULAR; INTRAVENOUS EVERY 6 HOURS PRN
Status: DISCONTINUED | OUTPATIENT
Start: 2025-05-15 | End: 2025-05-16 | Stop reason: HOSPADM

## 2025-05-15 RX ORDER — FENTANYL CITRATE 0.05 MG/ML
50 INJECTION, SOLUTION INTRAMUSCULAR; INTRAVENOUS EVERY 4 HOURS PRN
Status: DISCONTINUED | OUTPATIENT
Start: 2025-05-15 | End: 2025-05-16 | Stop reason: HOSPADM

## 2025-05-15 RX ORDER — ASPIRIN 81 MG/1
81 TABLET ORAL 2 TIMES DAILY
Status: DISCONTINUED | OUTPATIENT
Start: 2025-05-15 | End: 2025-05-16 | Stop reason: HOSPADM

## 2025-05-15 RX ORDER — GABAPENTIN 100 MG/1
100 CAPSULE ORAL 3 TIMES DAILY
Status: DISCONTINUED | OUTPATIENT
Start: 2025-05-15 | End: 2025-05-16 | Stop reason: HOSPADM

## 2025-05-15 RX ORDER — FENTANYL CITRATE 0.05 MG/ML
25 INJECTION, SOLUTION INTRAMUSCULAR; INTRAVENOUS EVERY 4 HOURS PRN
Status: DISCONTINUED | OUTPATIENT
Start: 2025-05-15 | End: 2025-05-15

## 2025-05-15 RX ADMIN — FENTANYL CITRATE 25 MCG: 50 INJECTION INTRAMUSCULAR; INTRAVENOUS at 16:33

## 2025-05-15 RX ADMIN — ONDANSETRON 4 MG: 4 TABLET, ORALLY DISINTEGRATING ORAL at 10:53

## 2025-05-15 RX ADMIN — OXYCODONE 10 MG: 5 TABLET ORAL at 13:42

## 2025-05-15 RX ADMIN — FENTANYL CITRATE 25 MCG: 50 INJECTION INTRAMUSCULAR; INTRAVENOUS at 20:20

## 2025-05-15 RX ADMIN — ENOXAPARIN SODIUM 40 MG: 100 INJECTION SUBCUTANEOUS at 08:45

## 2025-05-15 RX ADMIN — SODIUM CHLORIDE, PRESERVATIVE FREE 10 ML: 5 INJECTION INTRAVENOUS at 20:08

## 2025-05-15 RX ADMIN — MELOXICAM 3.75 MG: 7.5 TABLET ORAL at 08:44

## 2025-05-15 RX ADMIN — GABAPENTIN 100 MG: 100 CAPSULE ORAL at 20:01

## 2025-05-15 RX ADMIN — OXYCODONE 10 MG: 5 TABLET ORAL at 22:07

## 2025-05-15 RX ADMIN — OXYCODONE 10 MG: 5 TABLET ORAL at 05:32

## 2025-05-15 RX ADMIN — ROSUVASTATIN CALCIUM 20 MG: 20 TABLET, FILM COATED ORAL at 20:02

## 2025-05-15 RX ADMIN — ASPIRIN 81 MG: 81 TABLET, COATED ORAL at 09:28

## 2025-05-15 RX ADMIN — ATENOLOL 25 MG: 25 TABLET ORAL at 08:44

## 2025-05-15 RX ADMIN — GABAPENTIN 100 MG: 100 CAPSULE ORAL at 17:49

## 2025-05-15 RX ADMIN — OXYCODONE 10 MG: 5 TABLET ORAL at 09:28

## 2025-05-15 RX ADMIN — OXYCODONE 10 MG: 5 TABLET ORAL at 17:49

## 2025-05-15 ASSESSMENT — ENCOUNTER SYMPTOMS
SINUS PAIN: 0
BLOOD IN STOOL: 0
ABDOMINAL DISTENTION: 0
NAUSEA: 1
CHEST TIGHTNESS: 0
DIARRHEA: 0
CONSTIPATION: 0
SHORTNESS OF BREATH: 0
FACIAL SWELLING: 0
RHINORRHEA: 0
COUGH: 0
WHEEZING: 0
ABDOMINAL PAIN: 0

## 2025-05-15 ASSESSMENT — PAIN SCALES - GENERAL
PAINLEVEL_OUTOF10: 6
PAINLEVEL_OUTOF10: 4
PAINLEVEL_OUTOF10: 6
PAINLEVEL_OUTOF10: 4
PAINLEVEL_OUTOF10: 10
PAINLEVEL_OUTOF10: 10
PAINLEVEL_OUTOF10: 0
PAINLEVEL_OUTOF10: 0
PAINLEVEL_OUTOF10: 9
PAINLEVEL_OUTOF10: 10
PAINLEVEL_OUTOF10: 0
PAINLEVEL_OUTOF10: 6

## 2025-05-15 ASSESSMENT — PAIN SCALES - WONG BAKER
WONGBAKER_NUMERICALRESPONSE: NO HURT

## 2025-05-15 ASSESSMENT — PAIN DESCRIPTION - LOCATION
LOCATION: KNEE

## 2025-05-15 ASSESSMENT — PAIN DESCRIPTION - DESCRIPTORS
DESCRIPTORS: ACHING;STABBING;THROBBING
DESCRIPTORS: ACHING;PRESSURE;PENETRATING
DESCRIPTORS: ACHING;SORE;HEAVINESS
DESCRIPTORS: ACHING;STABBING;PINS AND NEEDLES

## 2025-05-15 ASSESSMENT — PAIN DESCRIPTION - ORIENTATION
ORIENTATION: RIGHT
ORIENTATION: LEFT

## 2025-05-15 NOTE — PLAN OF CARE
Problem: Discharge Planning  Goal: Discharge to home or other facility with appropriate resources  Outcome: Progressing  Flowsheets (Taken 5/14/2025 1335 by Melony Hawk, RN)  Discharge to home or other facility with appropriate resources:   Identify barriers to discharge with patient and caregiver   Identify discharge learning needs (meds, wound care, etc)     Problem: Pain  Goal: Verbalizes/displays adequate comfort level or baseline comfort level  Outcome: Progressing     Problem: Safety - Adult  Goal: Free from fall injury  Outcome: Progressing     Problem: ABCDS Injury Assessment  Goal: Absence of physical injury  Outcome: Progressing

## 2025-05-15 NOTE — CARE COORDINATION
5/15/2025: SS NOTE:  Pt discharging home today, [facility] by Radha rescheduled pt's home PT visit for tomorrow 5/16 and Melba at [facility] delivered pt's w/w to pt's hospital room, pt's wife to transport pt home. Electronically signed by STELLA Priest on 5/15/2025 at 11:28 AM

## 2025-05-15 NOTE — PLAN OF CARE
Problem: Discharge Planning  Goal: Discharge to home or other facility with appropriate resources  5/15/2025 1019 by Kalina Gonzalez RN  Outcome: Progressing     Problem: Pain  Goal: Verbalizes/displays adequate comfort level or baseline comfort level  5/15/2025 1019 by Kalina Gonzalez RN  Outcome: Progressing     Problem: Safety - Adult  Goal: Free from fall injury  5/15/2025 1019 by Kalina Gonzalez RN  Outcome: Progressing  5/14/2025 2207 by Sal Veras RN  Outcome: Progressing     Problem: ABCDS Injury Assessment  Goal: Absence of physical injury  5/15/2025 1019 by Kalina Gonzalez RN  Outcome: Progressing     Problem: Neurosensory - Adult  Goal: Achieves maximal functionality and self care  Outcome: Progressing     Problem: Skin/Tissue Integrity - Adult  Goal: Incisions, wounds, or drain sites healing without S/S of infection  Outcome: Progressing     Problem: Musculoskeletal - Adult  Goal: Return ADL status to a safe level of function  Outcome: Progressing

## 2025-05-15 NOTE — CARE COORDINATION
5/14/2025: SS NOTE:  SS Consult for post-op d/c planning and HHC/DME orders noted, d/c plan for pt to return home with [facility] by Daniella Garcia agency liaison notified of orders in Epic and of pt staying overnight now and to return home tomorrow, agency accepted pt and will reschedule pt's home PT for SOC 5/16 and [facility] to deliver pt's w/w for pt to take home prior to discharge, pt's wife to transport pt home, [facility] notified. Electronically signed by STELLA Priest on 5/14/2025 at 1:56 PM

## 2025-05-16 VITALS
TEMPERATURE: 97.7 F | HEART RATE: 75 BPM | OXYGEN SATURATION: 91 % | RESPIRATION RATE: 18 BRPM | SYSTOLIC BLOOD PRESSURE: 155 MMHG | BODY MASS INDEX: 27.35 KG/M2 | HEIGHT: 70 IN | WEIGHT: 191 LBS | DIASTOLIC BLOOD PRESSURE: 83 MMHG

## 2025-05-16 LAB
ALBUMIN SERPL-MCNC: 3.9 G/DL (ref 3.5–5.2)
ALP SERPL-CCNC: 48 U/L (ref 40–129)
ALT SERPL-CCNC: 23 U/L (ref 0–40)
ANION GAP SERPL CALCULATED.3IONS-SCNC: 11 MMOL/L (ref 7–16)
AST SERPL-CCNC: 24 U/L (ref 0–39)
BASOPHILS # BLD: 0 K/UL (ref 0–0.2)
BASOPHILS NFR BLD: 0 % (ref 0–2)
BILIRUB SERPL-MCNC: 0.8 MG/DL (ref 0–1.2)
BUN SERPL-MCNC: 7 MG/DL (ref 6–23)
CALCIUM SERPL-MCNC: 8.6 MG/DL (ref 8.6–10.2)
CHLORIDE SERPL-SCNC: 99 MMOL/L (ref 98–107)
CO2 SERPL-SCNC: 23 MMOL/L (ref 22–29)
CREAT SERPL-MCNC: 0.7 MG/DL (ref 0.7–1.2)
EOSINOPHIL # BLD: 0 K/UL (ref 0.05–0.5)
EOSINOPHILS RELATIVE PERCENT: 0 % (ref 0–6)
ERYTHROCYTE [DISTWIDTH] IN BLOOD BY AUTOMATED COUNT: 13.4 % (ref 11.5–15)
GFR, ESTIMATED: >90 ML/MIN/1.73M2
GLUCOSE SERPL-MCNC: 113 MG/DL (ref 74–99)
HCT VFR BLD AUTO: 32.1 % (ref 37–54)
HGB BLD-MCNC: 11 G/DL (ref 12.5–16.5)
LYMPHOCYTES NFR BLD: 0.58 K/UL (ref 1.5–4)
LYMPHOCYTES RELATIVE PERCENT: 7 % (ref 20–42)
MCH RBC QN AUTO: 32.5 PG (ref 26–35)
MCHC RBC AUTO-ENTMCNC: 34.3 G/DL (ref 32–34.5)
MCV RBC AUTO: 95 FL (ref 80–99.9)
MONOCYTES NFR BLD: 1.1 K/UL (ref 0.1–0.95)
MONOCYTES NFR BLD: 13 % (ref 2–12)
NEUTROPHILS NFR BLD: 80 % (ref 43–80)
NEUTS SEG NFR BLD: 6.72 K/UL (ref 1.8–7.3)
PLATELET # BLD AUTO: 96 K/UL (ref 130–450)
PLATELET CONFIRMATION: NORMAL
PMV BLD AUTO: 11.5 FL (ref 7–12)
POTASSIUM SERPL-SCNC: 3.3 MMOL/L (ref 3.5–5)
PROT SERPL-MCNC: 5.9 G/DL (ref 6.4–8.3)
RBC # BLD AUTO: 3.38 M/UL (ref 3.8–5.8)
RBC # BLD: NORMAL 10*6/UL
SODIUM SERPL-SCNC: 133 MMOL/L (ref 132–146)
WBC OTHER # BLD: 8.4 K/UL (ref 4.5–11.5)

## 2025-05-16 PROCEDURE — 6370000000 HC RX 637 (ALT 250 FOR IP): Performed by: INTERNAL MEDICINE

## 2025-05-16 PROCEDURE — 6370000000 HC RX 637 (ALT 250 FOR IP): Performed by: ORTHOPAEDIC SURGERY

## 2025-05-16 PROCEDURE — 97535 SELF CARE MNGMENT TRAINING: CPT

## 2025-05-16 PROCEDURE — 36415 COLL VENOUS BLD VENIPUNCTURE: CPT

## 2025-05-16 PROCEDURE — 6360000002 HC RX W HCPCS: Performed by: ORTHOPAEDIC SURGERY

## 2025-05-16 PROCEDURE — 6360000002 HC RX W HCPCS: Performed by: INTERNAL MEDICINE

## 2025-05-16 PROCEDURE — 80053 COMPREHEN METABOLIC PANEL: CPT

## 2025-05-16 PROCEDURE — 6360000002 HC RX W HCPCS

## 2025-05-16 PROCEDURE — 97530 THERAPEUTIC ACTIVITIES: CPT

## 2025-05-16 PROCEDURE — 6370000000 HC RX 637 (ALT 250 FOR IP)

## 2025-05-16 PROCEDURE — 97116 GAIT TRAINING THERAPY: CPT

## 2025-05-16 PROCEDURE — 85025 COMPLETE CBC W/AUTO DIFF WBC: CPT

## 2025-05-16 RX ORDER — AMLODIPINE BESYLATE 5 MG/1
5 TABLET ORAL DAILY
Status: DISCONTINUED | OUTPATIENT
Start: 2025-05-16 | End: 2025-05-16 | Stop reason: HOSPADM

## 2025-05-16 RX ORDER — POTASSIUM CHLORIDE 1500 MG/1
40 TABLET, EXTENDED RELEASE ORAL ONCE
Status: COMPLETED | OUTPATIENT
Start: 2025-05-16 | End: 2025-05-16

## 2025-05-16 RX ADMIN — ASPIRIN 81 MG: 81 TABLET, COATED ORAL at 08:18

## 2025-05-16 RX ADMIN — ONDANSETRON 4 MG: 2 INJECTION, SOLUTION INTRAMUSCULAR; INTRAVENOUS at 06:08

## 2025-05-16 RX ADMIN — OXYCODONE 10 MG: 5 TABLET ORAL at 12:13

## 2025-05-16 RX ADMIN — ENOXAPARIN SODIUM 40 MG: 100 INJECTION SUBCUTANEOUS at 08:17

## 2025-05-16 RX ADMIN — ATENOLOL 25 MG: 25 TABLET ORAL at 08:17

## 2025-05-16 RX ADMIN — LOSARTAN POTASSIUM 100 MG: 100 TABLET, FILM COATED ORAL at 08:17

## 2025-05-16 RX ADMIN — OXYCODONE 10 MG: 5 TABLET ORAL at 08:17

## 2025-05-16 RX ADMIN — GABAPENTIN 100 MG: 100 CAPSULE ORAL at 14:17

## 2025-05-16 RX ADMIN — OXYCODONE 10 MG: 5 TABLET ORAL at 02:11

## 2025-05-16 RX ADMIN — MELOXICAM 3.75 MG: 7.5 TABLET ORAL at 08:17

## 2025-05-16 RX ADMIN — GABAPENTIN 100 MG: 100 CAPSULE ORAL at 08:17

## 2025-05-16 RX ADMIN — FENTANYL CITRATE 50 MCG: 50 INJECTION INTRAMUSCULAR; INTRAVENOUS at 00:30

## 2025-05-16 RX ADMIN — AMLODIPINE BESYLATE 5 MG: 5 TABLET ORAL at 12:10

## 2025-05-16 RX ADMIN — POTASSIUM CHLORIDE 40 MEQ: 1500 TABLET, EXTENDED RELEASE ORAL at 12:10

## 2025-05-16 ASSESSMENT — ENCOUNTER SYMPTOMS
BLOOD IN STOOL: 0
RHINORRHEA: 0
FACIAL SWELLING: 0
ABDOMINAL PAIN: 0
ABDOMINAL DISTENTION: 0
DIARRHEA: 0
COUGH: 0
SINUS PAIN: 0
NAUSEA: 0
SHORTNESS OF BREATH: 0
CONSTIPATION: 0
WHEEZING: 0
CHEST TIGHTNESS: 0

## 2025-05-16 ASSESSMENT — PAIN SCALES - GENERAL
PAINLEVEL_OUTOF10: 4
PAINLEVEL_OUTOF10: 6
PAINLEVEL_OUTOF10: 6
PAINLEVEL_OUTOF10: 5
PAINLEVEL_OUTOF10: 4
PAINLEVEL_OUTOF10: 7

## 2025-05-16 ASSESSMENT — PAIN DESCRIPTION - ORIENTATION
ORIENTATION: RIGHT
ORIENTATION: RIGHT

## 2025-05-16 ASSESSMENT — PAIN SCALES - WONG BAKER
WONGBAKER_NUMERICALRESPONSE: NO HURT
WONGBAKER_NUMERICALRESPONSE: NO HURT

## 2025-05-16 ASSESSMENT — PAIN DESCRIPTION - LOCATION
LOCATION: KNEE
LOCATION: KNEE;LEG

## 2025-05-16 ASSESSMENT — PAIN DESCRIPTION - DESCRIPTORS
DESCRIPTORS: ACHING;SORE;TENDER
DESCRIPTORS: ACHING;SORE;TENDER

## 2025-05-16 NOTE — PLAN OF CARE
Problem: Discharge Planning  Goal: Discharge to home or other facility with appropriate resources  Outcome: Progressing     Problem: Pain  Goal: Verbalizes/displays adequate comfort level or baseline comfort level  Outcome: Progressing     Problem: Safety - Adult  Goal: Free from fall injury  Outcome: Progressing     Problem: ABCDS Injury Assessment  Goal: Absence of physical injury  Outcome: Progressing     Problem: Neurosensory - Adult  Goal: Achieves maximal functionality and self care  Outcome: Progressing     Problem: Skin/Tissue Integrity - Adult  Goal: Incisions, wounds, or drain sites healing without S/S of infection  Outcome: Progressing     Problem: Musculoskeletal - Adult  Goal: Return ADL status to a safe level of function  Outcome: Progressing

## 2025-05-16 NOTE — CARE COORDINATION
5/16/2025: SS NOTE:  Notified Daniella intake liaison for [facility] by [employer] of pt stayed overnight again and to  reschedule pt's home PT visit for tomorrow 5/17, pt's wife to transport pt home. Nursing informed.Electronically signed by STELLA Priest on 5/16/2025 at 9:11 AM

## 2025-05-16 NOTE — PLAN OF CARE
Problem: Discharge Planning  Goal: Discharge to home or other facility with appropriate resources  5/15/2025 2052 by Sal Veras RN  Outcome: Progressing     Problem: Pain  Goal: Verbalizes/displays adequate comfort level or baseline comfort level  5/15/2025 2052 by Sal Veras RN  Outcome: Progressing     Problem: Safety - Adult  Goal: Free from fall injury  5/15/2025 2052 by Sal Veras RN  Outcome: Progressing     Problem: ABCDS Injury Assessment  Goal: Absence of physical injury  5/15/2025 2052 by Sal Veras RN  Outcome: Progressing     Problem: Neurosensory - Adult  Goal: Achieves maximal functionality and self care  5/15/2025 2052 by Sal Veras RN  Outcome: Progressing     Problem: Skin/Tissue Integrity - Adult  Goal: Incisions, wounds, or drain sites healing without S/S of infection  5/15/2025 2052 by Sal Veras RN  Outcome: Progressing     Problem: Musculoskeletal - Adult  Goal: Return ADL status to a safe level of function  5/15/2025 2052 by Sal Veras RN  Outcome: Progressing

## 2025-05-16 NOTE — PROGRESS NOTES
4 Eyes Skin Assessment     NAME:  Shaheed Clayton  YOB: 1960  MEDICAL RECORD NUMBER:  [id number]    The patient is being assessed for  Admission    I agree that at least one RN has performed a thorough Head to Toe Skin Assessment on the patient. ALL assessment sites listed below have been assessed.      Areas assessed by both nurses:    Head, Face, Ears, Shoulders, Back, Chest, Arms, Elbows, Hands, Sacrum. Buttock, Coccyx, Ischium, and Legs. Feet and Heels        Does the Patient have a Wound? No noted wound(s)       Williams Prevention initiated by RN: No  Wound Care Orders initiated by RN: No    Pressure Injury (Stage 3,4, Unstageable, DTI, NWPT, and Complex wounds) if present, place Wound referral order by RN under : No    New Ostomies, if present place, Ostomy referral order under : No     Nurse 1 eSignature: Electronically signed by Melony Hawk RN on 5/14/25 at 2:54 PM EDT    **SHARE this note so that the co-signing nurse can place an eSignature**    Nurse 2 eSignature: Electronically signed by Marietta Prieto RN on 5/14/25 at 2:58 PM EDT   
CLINICAL PHARMACY NOTE: MEDS TO BEDS    Total # of Prescriptions Filled: 3   The following medications were delivered to the patient:  Roxicodone 10 mg  Cephalexin 500 mg  Gabapentin 300 mg    Additional Documentation:  Pt refused aspirin 81 mg  
Department of Orthopedic Surgery  Resident Progress Note    Patient seen and examined at bedside awake alert oriented x 3 complaining of increasing pain to right lower extremity.  States that overnight his pain was excruciating and he has lost all flexion in his leg.  Has not walked since yesterday with physical therapy which he stated he did very well but then his pain increased and he lost his flexion and has laid in bed since then unable to move his leg.  Denies any increasing numbness tingling paresthesias down right lower extremity lateral right foot.  Denies any other orthopedic complaints    VITALS:  BP (!) 141/85   Pulse 70   Temp 97.7 °F (36.5 °C) (Oral)   Resp 18   Ht 1.778 m (5' 10\")   Wt 86.6 kg (191 lb)   SpO2 91%   BMI 27.41 kg/m²     General: Awake alert oriented x 3    MUSCULOSKELETAL:   right lower extremity:  Dressing slightly saturated at the distal aspect of the dressing changes needed  Right lower extremity swollen about the knee but normal for postsurgical changes.  No swelling distal to the knee  Compartments soft and compressible  Calf soft and supple nontender  +PF/DF/EHL  +2/4 DP & PT pulses, Brisk Cap refill, Toes warm and perfused  Distal sensation grossly intact to Peroneals, Sural, Saphenous, and tibial nrs    CBC:   Lab Results   Component Value Date/Time    WBC 8.4 05/16/2025 04:14 AM     01/27/2025 11:22 AM    HGB 11.0 05/16/2025 04:14 AM    HCT 32.1 05/16/2025 04:14 AM    PLT 96 05/16/2025 04:14 AM     PT/INR:    Lab Results   Component Value Date/Time    PROTIME 10.6 05/06/2025 10:00 AM    PROTIME 14.7 01/27/2011 03:00 AM    INR 1.0 05/06/2025 10:00 AM       ASSESSMENT  S/P right total knee arthroplasty 5/14/2025    PLAN    Plan discussed with patient all patient's questions answered to his satisfaction  Weightbearing as tolerated right lower extremity  DVT prophylaxis today aspirin 81 twice daily  24 hours postop antibiotics completed  Medical management 
Department of Orthopedic Surgery  Resident Progress Note    Patient seen and examined at bedside awake alert oriented x 3 complaining of mild pain to right lower extremity at this time.  Does state that he ambulated multiple times yesterday but that his flexion has slightly decreased today and the swelling has increased.  Denies any increasing numbness tingling paresthesias travel on right lower extremity or right foot.  Denies any chest pain shortness of breath fevers chills nausea vomiting    VITALS:  /73   Pulse 62   Temp 97.5 °F (36.4 °C) (Oral)   Resp 16   Ht 1.778 m (5' 10\")   Wt 86.6 kg (191 lb)   SpO2 97%   BMI 27.41 kg/m²     General: Awake alert oriented x 3    MUSCULOSKELETAL:   right lower extremity:  Dressing C/D/I  Compartments soft and compressible  +PF/DF/EHL  +2/4 DP & PT pulses, Brisk Cap refill, Toes warm and perfused  Distal sensation grossly intact to Peroneals, Sural, Saphenous, and tibial nrs    CBC:   Lab Results   Component Value Date/Time    WBC 11.6 05/15/2025 04:46 AM     01/27/2025 11:22 AM    HGB 11.3 05/15/2025 04:46 AM    HCT 32.0 05/15/2025 04:46 AM     04/21/2025 08:18 AM     PT/INR:    Lab Results   Component Value Date/Time    PROTIME 10.6 05/06/2025 10:00 AM    PROTIME 14.7 01/27/2011 03:00 AM    INR 1.0 05/06/2025 10:00 AM       ASSESSMENT  S/P right total knee arthroplasty 5/14/2025    PLAN    Plan discussed with patient all patient's questions answered to his satisfaction  Weightbearing as tolerated right lower extremity  DVT prophylaxis today aspirin 81 twice daily  Continue 24 hours postop antibiotics  Medical management appreciated  PT/OT as able  Plan: Patient will be discharged home today.  Patient will follow-up outpatient with Dr. Christiansen in 2 weeks for repeat evaluation imaging and staple removal  D/W attending  Electronically signed by Joseph Escalante DO on 5/15/2025 at 9:55 AM   
Estevan had an appointment with PCP for medical clearance on 4/1. He has an appointment for cardiac clearance on 4/29.  
OCCUPATIONAL THERAPY INITIAL EVALUATION    [place]  [address] [facility] [place]. [place]        Date:5/15/2025                                                  Patient Name: Shaheed Clayton    MRN: [id number]    : 1960    Room: [address] [place] [address]      Evaluating OT: Stella Angeles OTR/L; [id number]     Referring Provider and Specific Provider Orders/Date:      25 1330  OT eval and treat  Start:  25 1330,   End:  25 1330,   ONE TIME,   Standing Count:  1 Occurrences,   R         Huber Christiansen, DO      Placement Recommendation: Home with no skilled occupational therapy needed after discharge from inpatient.        Diagnosis:   1. Pre-op evaluation    2. Primary osteoarthritis of right knee    3. Right knee DJD         Surgery: R TKA      Pertinent Medical History:       Past Medical History:   Diagnosis Date    Alcohol abuse     drinks 8-10 beers daily    BPH with elevated PSA     CAD (coronary artery disease)     one stent    Cancer (HCC) 2021    Grade 1 prostrate cancer   (no intervention)    Chest pain 2011    suggestive of unstable angina    COVID-19 2022    flu  symptoms    DJD (degenerative joint disease)     right knee     Gastritis 2010    on EGD    GERD (gastroesophageal reflux disease)     H/O exercise stress test 2011    Treadmill nuclear stress test: Srinivasan protocol. 8 min, 30 sec. 95% max predicted heart rate. Physiologic BP response. Patient developed chest pain at 7 1/2 min into exercise. Had around 2 mm ST segment depressions. Nuclear images read as showing no evidence of stress-induced ischemia    H/O exercise stress test 2011    Treadmill nuclear stress test: Srinivasan protocol. 9 min and 30 sec. 101% of max predicted heart rate. Hypertensive BP response. No CP. No ischemic EKG changes. Nuclear images showed soft tissue attenuation artifacts. There did not appear to be any evidence of stress-induced ischemia 
OCCUPATIONAL THERAPY TEATMENT NOTE  [facility]  [address] [facility] [place]. [place]        Date:2025                                                  Patient Name: Shaheed Clayton    MRN: [id number]    : 1960    Room: [address] [place] [address]      Evaluating OT: Stella Angeles OTR/L; [id number]     Referring Provider and Specific Provider Orders/Date:      25 1330  OT eval and treat  Start:  25 1330,   End:  25 1330,   ONE TIME,   Standing Count:  1 Occurrences,   R         Kuldip, Huber GANDHI,       Placement Recommendation: Home with no skilled occupational therapy needed after discharge from inpatient.        Diagnosis:   1. Pre-op evaluation    2. Primary osteoarthritis of right knee    3. Right knee DJD         Surgery: R TKA      Pertinent Medical History:       Past Medical History:   Diagnosis Date    Alcohol abuse     drinks 8-10 beers daily    BPH with elevated PSA     CAD (coronary artery disease)     one stent    Cancer (HCC) 2021    Grade 1 prostrate cancer   (no intervention)    Chest pain 2011    suggestive of unstable angina    COVID-19 2022    flu  symptoms    DJD (degenerative joint disease)     right knee     Gastritis 2010    on EGD    GERD (gastroesophageal reflux disease)     H/O exercise stress test 2011    Treadmill nuclear stress test: Srinivasan protocol. 8 min, 30 sec. 95% max predicted heart rate. Physiologic BP response. Patient developed chest pain at 7 1/2 min into exercise. Had around 2 mm ST segment depressions. Nuclear images read as showing no evidence of stress-induced ischemia    H/O exercise stress test 2011    Treadmill nuclear stress test: Srinivasan protocol. 9 min and 30 sec. 101% of max predicted heart rate. Hypertensive BP response. No CP. No ischemic EKG changes. Nuclear images showed soft tissue attenuation artifacts. There did not appear to be any evidence of stress-induced ischemia on 
Patient brought to PACU for scheduled right adductor canal nerve block with Dr. Olivares. Patient place on monitors and oxygen; consents verified.  0745: Pre procedure vitals-/86, HR 57, 98% on 3L NC  0749: Time out performed  0750: Patient pre medicated, See MAR  [id number]: Block performed  0800: Post procedure vitals- /80, HR 58, 97% on 3L NC  Patients wife updated.  
Physical Therapy Initial Evaluation/Plan of Care    Room #:  0318/0318-01  Patient Name: Shaheed Clayton  YOB: 1960  MRN: [id number]    Date of Service: 5/14/2025     Tentative placement recommendation: Home with Home Health Physical Therapy   Equipment recommendation: Wheeled Walker, delivered to room      Evaluating Physical Therapist: Juan Manuel Wei, PT  #[id number]     Specific Provider Orders/Date/Referring Provider :  PT eval and treat  Start:  05/14/25 1330,   End:  05/14/25 1330,   ONE TIME,   Standing Count:  1 Occurrences,   R       Huber Christiansen DO    Admitting Diagnosis:   Right knee DJD [M17.11]  Post-operative state [Z98.890]   Visit diagnosis:         Patient Active Problem List   Diagnosis    Glenoid labral tear    Shoulder impingement    Rotator cuff tear    Blunt eye injury, bilateral    Facial injury    Hematoma of thigh    CAD (coronary artery disease)    HTN (hypertension)    History of PTCA    Hyperlipidemia    Hypothyroidism    DJD (degenerative joint disease)    ETOH abuse    Tubular adenoma of colon    H/O gastritis    Deviated septum    Nasal turbinate hypertrophy    Nasal congestion    Chronic maxillary sinusitis    Trochanteric bursitis, right hip    Right hip tendonitis    Primary osteoarthritis of right knee    ALT (SGPT) level raised    Complex tear of medial meniscus of right knee as current injury    Prostate cancer (HCC)    Dizziness    Elevated liver enzymes    Essential hypertension    Hepatic steatosis    Near syncope    Pre-syncope    Accelerated hypertension    Thrombocytopenia    Right knee DJD    Post-operative state    Pre-op evaluation       ASSESSMENT of current deficits: Patient exhibits decreased strength, balance, endurance, range of motion, and pain rightknee  impairing functional mobility, transfers, gait , gait distance, and tolerance to activity are barriers to d/c and require skilled intervention to address concerns listed above to increase safety 
Progress Note  Date:2025       Room:0318/0318-01  Patient Name:Shaheed Clayton     YOB: 1960     Age:65 y.o.        Subjective    Subjective:  Symptoms:  No shortness of breath, cough, chest pain, weakness or diarrhea.  (Pt has pain in  the right knee , but better controlled and feels better today , was up with therapy ).    Diet:  No nausea.    Activity level: Impaired due to pain.    Pain:  He complains of pain that is moderate.       Review of Systems   Constitutional:  Negative for appetite change, chills, fatigue, fever and unexpected weight change.   HENT:  Negative for congestion, facial swelling, mouth sores, rhinorrhea and sinus pain.    Eyes:  Negative for visual disturbance.   Respiratory:  Negative for cough, chest tightness, shortness of breath and wheezing.    Cardiovascular:  Negative for chest pain and leg swelling.   Gastrointestinal:  Negative for abdominal distention, abdominal pain, blood in stool, constipation, diarrhea and nausea.   Genitourinary:  Negative for difficulty urinating, dysuria, frequency and urgency.   Musculoskeletal:  Negative for joint swelling.        Pain in the right knee    Skin:  Negative for rash.   Neurological:  Negative for dizziness, syncope, weakness, light-headedness and headaches.   Psychiatric/Behavioral:  Negative for behavioral problems, confusion and hallucinations.      Objective         Vitals Last 24 Hours:  TEMPERATURE:  Temp  Av.7 °F (36.5 °C)  Min: 97.7 °F (36.5 °C)  Max: 97.7 °F (36.5 °C)  RESPIRATIONS RANGE: Resp  Av.8  Min: 17  Max: 18  PULSE OXIMETRY RANGE: SpO2  Av %  Min: 91 %  Max: 91 %  PULSE RANGE: Pulse  Av.5  Min: 70  Max: 75  BLOOD PRESSURE RANGE: Systolic (24hrs), Av , Min:141 , Max:164   ; Diastolic (24hrs), Av, Min:83, Max:90    I/O (24Hr):    Intake/Output Summary (Last 24 hours) at 2025 1842  Last data filed at 2025 1402  Gross per 24 hour   Intake 240 ml   Output 1050 ml   Net 
Progress Note  Date:5/15/2025       Room:0318/0318-01  Patient Name:Shaheed Clayton     YOB: 1960     Age:65 y.o.        Subjective    Subjective:  Symptoms:  No shortness of breath, cough, chest pain, weakness or diarrhea.  (Pt has pain in  the right knee , and nauseated and not feeling right ).    Diet:  He reports  nausea.    Activity level: Impaired due to pain.    Pain:  He complains of pain that is moderate.       Review of Systems   Constitutional:  Positive for fatigue. Negative for appetite change, chills, fever and unexpected weight change.   HENT:  Negative for congestion, facial swelling, mouth sores, rhinorrhea and sinus pain.    Eyes:  Negative for visual disturbance.   Respiratory:  Negative for cough, chest tightness, shortness of breath and wheezing.    Cardiovascular:  Negative for chest pain and leg swelling.   Gastrointestinal:  Positive for nausea. Negative for abdominal distention, abdominal pain, blood in stool, constipation and diarrhea.   Genitourinary:  Negative for difficulty urinating, dysuria, frequency and urgency.   Musculoskeletal:  Negative for joint swelling.        Pain in the right knee    Skin:  Negative for rash.   Neurological:  Positive for dizziness. Negative for syncope, weakness, light-headedness and headaches.   Psychiatric/Behavioral:  Negative for behavioral problems, confusion and hallucinations.      Objective         Vitals Last 24 Hours:  TEMPERATURE:  Temp  Av.8 °F (36.6 °C)  Min: 97.5 °F (36.4 °C)  Max: 97.9 °F (36.6 °C)  RESPIRATIONS RANGE: Resp  Av.6  Min: 15  Max: 18  PULSE OXIMETRY RANGE: SpO2  Av.3 %  Min: 96 %  Max: 97 %  PULSE RANGE: Pulse  Av  Min: 61  Max: 76  BLOOD PRESSURE RANGE: Systolic (24hrs), Av , Min:111 , Max:129   ; Diastolic (24hrs), Av, Min:71, Max:82    I/O (24Hr):    Intake/Output Summary (Last 24 hours) at 5/15/2025 1614  Last data filed at 5/15/2025 1316  Gross per 24 hour   Intake 440 ml   Output 
advance to step through gait pattern for short period, however due to pain, reverted back to step to gait pattern. Patient continues to experience drainage from incision, nursing reinforced with bandage prior to session. Performed stair training and seated exercises with some assist needed to RLE with seated LAQ.        PHYSICAL THERAPY  PLAN OF CARE       Physical therapy plan of care is established based on physician order,  patient diagnosis and clinical assessment    Current Treatment Recommendations:    -Bed Mobility: Lower and upper extremity exercises, and trunk control activities  -Standing Balance: Perform strengthening exercises in standing to promote motor control with or without upper extremity support   -Transfers: Provide instruction on proper hand and foot position for adequate transfer of weight onto lower extremities and use of gait device if needed and Cues for hand placement, technique and safety. Provide stabilization to prevent fall   -Gait: Gait training and Standing activities to improve: base of support, weight shift, weight bearing    -Endurance: Utilize Supervised activities to increase level of endurance to allow for safe functional mobility including transfers and gait   -Positioning for skin integrity and comfort    PT long term treatment goals are located in below grid    Patient and or family understand(s) diagnosis, prognosis, and plan of care.    Frequency of treatments: Patient will be seen  twice daily  for therapeutic exercise, functional retraining, endurance activities, balance exercises, family and patient education.       Prior Level of Function: Patient ambulated independently    Rehab Potential: good for baseline      Past medical history:   Past Medical History:   Diagnosis Date    Alcohol abuse     drinks 8-10 beers daily    BPH with elevated PSA     CAD (coronary artery disease)     one stent    Cancer (HCC) 08/01/2021    Grade 1 prostrate cancer   (no intervention)    
Minimal assist of 1   Using rails and head of bed elevated:     Rolling: Independent   Supine to sit: Independent   Sit to supine: Not assessed patient in chair   Scooting: Independent    Rolling: Independent    Supine to sit: Independent    Sit to supine: Independent    Scooting: Independent     Transfers Sit to stand: Minimal assist of 1   Sit to stand: Supervision  x multiple reps   Sit to stand: Modified Independent     Ambulation     2x50 feet using  wheeled walker with Minimal assist of 1   for walker approximation, upright, multiplane instability, and safety, Patient with step to progressing to step through pattern and unsteady gait, no loss of balance, and cues for sequencing, upright posture, walker approximation, safety, and pacing   2x30, 3', 20 feet using  wheeled walker with Supervision    cues for sequencing, walker approximation, safety, and pacing   100 feet using  wheeled walker with Modified Independent    Stair negotiation: ascended and descended   2 x 7 steps, bilateral rail, Supervision    and cues for technique  2 x 7 steps, bilateral rail, Supervision         2 steps, without rail, Supervision     with crutches   ROM Within functional limits except Right knee 0-75°     Increase range of motion 10% of affected joints    Strength Within functional limits  except  Right lower extremity 3/5  Within functional limits   Balance Sitting EOB:  good    Dynamic Standing:  fair   wheeled walker  Sitting EOB: good   Dynamic Standing: fair + with wheeled walker   Sitting EOB:  good    Dynamic Standing:  good wheeled walker      Patient is Alert & Oriented x person, place, time, and situation and follows directions    Sensation:  Patient denies numbness/tingling  Edema:  yes right lower extremity        Patient education  Patient educated on role of Physical Therapy, risks of immobility, safety and plan of care, energy conservation,  importance of mobility while in hospital , importance and purpose of

## 2025-05-17 NOTE — DISCHARGE SUMMARY
Physician Discharge Summary     Patient ID:  Shaheed Clayton  [id number]  65 y.o.  1960    Admit date: 5/14/2025    Discharge date and time: 5/16/2025  2:37 PM     Admitting Physician: Huber Christiansen DO     Discharge Physician: Huber Christiansen DO    Admission Diagnoses: Right knee DJD [M17.11]  Post-operative state [Z98.890]    Discharge Diagnoses: s/p right total Knee arthroplasty    Admission Condition: good    Discharged Condition: good    Hospital Course: The patient was admitted from the pre-operative holding area and underwent an uneventful course of right knee arthroplasty on 5/14/2025 by Huber Christiansen DO. The patient was subsequently taken to the PACU and to the floor in stable condition. The patient continued antibiotics 24 hours postoperatively, as they received a dose of antibiotics preoperatively for infection prophylaxis. The patient was to begin physical therapy, WBAT, the day of surgery. The patient was also started on DVT prophylaxis. Blood counts were followed daily. On post-op day 2, the dressing was changed, revealing the wound to be benign in appearance without obvious signs of infection including erythema or purulence. The elkins was discontinued. Pain medications were transitioned to oral medication. was consulted for D/C planning as the patient made appropriate gains with PT in regaining independent function. On POD 2, the patient was discharged to home in stable condition.    Treatments: s/p right total Knee arthroplasty    Disposition: The patient was provided instructions to follow up with Huber Christiansen DO in 2 weeks and to call the office for an appointment. staples (s) were to be removed in 2 weeks. Pt was to continue DVT prophylaxis as directed. Patient was also instructed they may shower on POD 4, and to continue daily dry sterile dressing changes until wound was dry.      Discharge Medications:     Medication List        START taking these medications

## 2025-05-20 ENCOUNTER — TELEPHONE (OUTPATIENT)
Dept: ORTHOPEDIC SURGERY | Age: 65
End: 2025-05-20

## 2025-05-20 DIAGNOSIS — G89.18 POST-OP PAIN: Primary | ICD-10-CM

## 2025-05-20 RX ORDER — OXYCODONE AND ACETAMINOPHEN 10; 325 MG/1; MG/1
1 TABLET ORAL EVERY 6 HOURS PRN
Qty: 28 TABLET | Refills: 0 | Status: SHIPPED | OUTPATIENT
Start: 2025-05-20 | End: 2025-05-27

## 2025-05-20 NOTE — TELEPHONE ENCOUNTER
Patient called and requested a refill on his pain medicine. Pharmacy is [facility].

## 2025-05-21 DIAGNOSIS — M17.11 PRIMARY OSTEOARTHRITIS OF RIGHT KNEE: Primary | ICD-10-CM

## 2025-05-21 LAB — SURGICAL PATHOLOGY REPORT: NORMAL

## 2025-05-21 RX ORDER — OXYCODONE HYDROCHLORIDE 10 MG/1
10 TABLET ORAL EVERY 4 HOURS PRN
Qty: 42 TABLET | Refills: 0 | Status: SHIPPED | OUTPATIENT
Start: 2025-05-21 | End: 2025-05-28

## 2025-05-23 DIAGNOSIS — M17.11 RIGHT KNEE DJD: ICD-10-CM

## 2025-05-23 DIAGNOSIS — Z98.890 POST-OPERATIVE STATE: ICD-10-CM

## 2025-05-23 LAB
ANION GAP SERPL CALCULATED.3IONS-SCNC: 15 MMOL/L (ref 7–16)
BUN SERPL-MCNC: 17 MG/DL (ref 8–23)
CALCIUM SERPL-MCNC: 9.9 MG/DL (ref 8.8–10.2)
CHLORIDE SERPL-SCNC: 93 MMOL/L (ref 98–107)
CO2 SERPL-SCNC: 24 MMOL/L (ref 22–29)
CREAT SERPL-MCNC: 0.8 MG/DL (ref 0.7–1.2)
ERYTHROCYTE [DISTWIDTH] IN BLOOD BY AUTOMATED COUNT: 13.2 % (ref 11.5–15)
GFR, ESTIMATED: >90 ML/MIN/1.73M2
GLUCOSE SERPL-MCNC: 105 MG/DL (ref 74–99)
HCT VFR BLD AUTO: 35.6 % (ref 37–54)
HGB BLD-MCNC: 12.2 G/DL (ref 12.5–16.5)
MCH RBC QN AUTO: 32.4 PG (ref 26–35)
MCHC RBC AUTO-ENTMCNC: 34.3 G/DL (ref 32–34.5)
MCV RBC AUTO: 94.7 FL (ref 80–99.9)
PLATELET # BLD AUTO: 376 K/UL (ref 130–450)
PMV BLD AUTO: 10.8 FL (ref 7–12)
POTASSIUM SERPL-SCNC: 4.1 MMOL/L (ref 3.5–5.1)
RBC # BLD AUTO: 3.76 M/UL (ref 3.8–5.8)
SODIUM SERPL-SCNC: 131 MMOL/L (ref 136–145)
WBC OTHER # BLD: 13 K/UL (ref 4.5–11.5)

## 2025-05-27 ENCOUNTER — OFFICE VISIT (OUTPATIENT)
Dept: FAMILY MEDICINE CLINIC | Age: 65
End: 2025-05-27

## 2025-05-27 VITALS
DIASTOLIC BLOOD PRESSURE: 70 MMHG | HEIGHT: 70 IN | BODY MASS INDEX: 25.38 KG/M2 | OXYGEN SATURATION: 98 % | TEMPERATURE: 98.2 F | HEART RATE: 76 BPM | WEIGHT: 177.3 LBS | SYSTOLIC BLOOD PRESSURE: 118 MMHG

## 2025-05-27 DIAGNOSIS — R79.89 LOW SERUM SODIUM: ICD-10-CM

## 2025-05-27 DIAGNOSIS — D64.9 ANEMIA, UNSPECIFIED TYPE: ICD-10-CM

## 2025-05-27 DIAGNOSIS — I25.10 CORONARY ARTERY DISEASE INVOLVING NATIVE CORONARY ARTERY OF NATIVE HEART WITHOUT ANGINA PECTORIS: ICD-10-CM

## 2025-05-27 DIAGNOSIS — E78.5 HYPERLIPIDEMIA, UNSPECIFIED HYPERLIPIDEMIA TYPE: ICD-10-CM

## 2025-05-27 DIAGNOSIS — Z96.651 STATUS POST RIGHT KNEE REPLACEMENT: ICD-10-CM

## 2025-05-27 DIAGNOSIS — Z09 HOSPITAL DISCHARGE FOLLOW-UP: Primary | ICD-10-CM

## 2025-05-27 DIAGNOSIS — I10 ESSENTIAL HYPERTENSION: ICD-10-CM

## 2025-05-27 DIAGNOSIS — M79.89 RIGHT LEG SWELLING: Primary | ICD-10-CM

## 2025-05-27 DIAGNOSIS — M79.89 RIGHT LEG SWELLING: ICD-10-CM

## 2025-05-27 NOTE — PROGRESS NOTES
Post-Discharge Transitional Care  Follow Up      Shaheed Clayton   YOB: 1960    Date of Office Visit:  5/27/2025  Date of Hospital Admission: 5/14/25  Date of Hospital Discharge: 5/16/25  Risk of hospital readmission (high >=14%. Medium >=10%) :Readmission Risk Score: 10.3      Care management risk score Rising risk (score 2-5) and Complex Care (Scores >=6): No Risk Score On File     Non face to face  following discharge, date last encounter closed (first attempt may have been earlier): *No documented post hospital discharge outreach found in the last 14 days    Call initiated 2 business days of discharge: *No response recorded in the last 14 days    ASSESSMENT/PLAN:   Hospital discharge follow-up  -     UT DISCHARGE MEDS RECONCILED W/ CURRENT OUTPATIENT MED LIST  Anemia, unspecified type  -     CBC with Auto Differential; Future  -     Hepatic Function Panel; Future  Low serum sodium  -     Basic Metabolic Panel; Future  Right leg swelling  -     Vascular duplex lower extremity venous right; Future  Status post right knee replacement  Essential hypertension  Hyperlipidemia, unspecified hyperlipidemia type  Coronary artery disease involving native coronary artery of native heart without angina pectoris      Medical Decision Making: high complexity  Return in 2 months (on 8/11/2025).           Subjective:   HPI:  Follow up of Hospital problems/diagnosis(es):     Status post right knee replacement-May 14, 2025  Anemia-postoperative  Weakness and fatigue  Hypertension-     Inpatient course: Discharge summary reviewed- see chart.    Interval history/Current status:     Patient complain of swelling and pain in right knee area in the leg.  Patient had symptoms-started after the surgery.  Patient had decrease in hemoglobin and anemia but is thought to be dilutional.  His tightness and swelling in the leg, according to patient is still there but less than earlier    No chest pain.  But has feeling of

## 2025-05-29 ENCOUNTER — OFFICE VISIT (OUTPATIENT)
Dept: ORTHOPEDIC SURGERY | Age: 65
End: 2025-05-29

## 2025-05-29 VITALS — HEIGHT: 70 IN | WEIGHT: 177 LBS | BODY MASS INDEX: 25.34 KG/M2 | TEMPERATURE: 98.6 F

## 2025-05-29 DIAGNOSIS — Z96.651 S/P TOTAL KNEE ARTHROPLASTY, RIGHT: Primary | ICD-10-CM

## 2025-05-29 PROCEDURE — 99024 POSTOP FOLLOW-UP VISIT: CPT | Performed by: ORTHOPAEDIC SURGERY

## 2025-05-29 RX ORDER — OXYCODONE HYDROCHLORIDE 10 MG/1
10 TABLET ORAL EVERY 6 HOURS PRN
Qty: 28 TABLET | Refills: 0 | Status: SHIPPED | OUTPATIENT
Start: 2025-05-29 | End: 2025-06-05

## 2025-05-29 NOTE — PROGRESS NOTES
Subjective:     Post-Operative week: 2 Status Post right Total Knee Arthroplasty, surgery date 5/14/25. Systemic or Specific Complaints:none. He is ambulating with walker.     Objective:     General: alert, appears stated age and cooperative   Wound: Wound clean and dry no evidence of infection., No Erythema, No Edema and No Drainage   Motion: Flexion: 3 to 80 Degrees   DVT Exam: No evidence of DVT seen on physical exam.  Negative Elsy's sign.  No cords or calf tenderness.  No significant calf/ankle edema.     Imaging:  Xrays:  No signs of fracture, there is good alignment, and no signs of aseptic loosening.   Radiographic findings reviewed with patient    Assessment:     Encounter Diagnosis   Name Primary?    S/P total knee arthroplasty, right Yes      Doing well postoperatively.     Plan:     Continues current post-op course, staples were removed at today's appointment  Patient is to continue taking enteric coated aspirin 81 mg, 1 tablet twice daily.    Patient is to continue wearing DWAYNE hose, on during the day and off at night.    Outpatient physical therapy is to begin immediately.    No bathing/swimming/hot tub for two weeks or until incision is completely closed.    We will see the patient back in 3 weeks for repeat xray and evaluation.  Please call office with any questions or concerns at [phone]

## 2025-06-02 ENCOUNTER — EVALUATION (OUTPATIENT)
Dept: PHYSICAL THERAPY | Age: 65
End: 2025-06-02
Payer: MEDICARE

## 2025-06-02 DIAGNOSIS — Z96.651 S/P TOTAL KNEE ARTHROPLASTY, RIGHT: Primary | ICD-10-CM

## 2025-06-02 PROCEDURE — 97110 THERAPEUTIC EXERCISES: CPT | Performed by: PHYSICAL THERAPIST

## 2025-06-02 PROCEDURE — 97016 VASOPNEUMATIC DEVICE THERAPY: CPT | Performed by: PHYSICAL THERAPIST

## 2025-06-02 PROCEDURE — 97161 PT EVAL LOW COMPLEX 20 MIN: CPT | Performed by: PHYSICAL THERAPIST

## 2025-06-02 NOTE — PROGRESS NOTES
[facility] OUTPATIENT REHABILITATION  PHYSICAL THERAPY INITIAL EVALUATION         Date:  2025   Patient: Shaheed Clayton  : 1960  MRN: [id number]  Referring Provider: [place] [address]     Medical Diagnosis:     Z96.651 (ICD-10-CM) - S/P total knee arthroplasty, right    Physician Order: Eval and Treat     SUBJECTIVE:     Surgical procedure: R TKA    Date of surgery: 2025    Services provided following surgery: home care [facility]    History: TKA due to DJD.    Chief complaint: pain, edema, weakness, inability / limited ability to use leg, difficulty walking    Pain:   Current: 5/10 aching  mostly constant, 9/10 sharp jolts    Symptom Type / Quality: aching, sharp  Location:: Knee: global       Imaging results: Vascular duplex lower extremity venous right  Result Date: 2025  EXAMINATION: DUPLEX VENOUS ULTRASOUND OF THE RIGHT LOWER EXTREMITY 2025 8:42 am TECHNIQUE: Duplex ultrasound using B-mode/gray scaled imaging and Doppler spectral analysis and color flow was obtained of the deep venous structures of the right extremity. COMPARISON: None. HISTORY: ORDERING SYSTEM PROVIDED HISTORY: Edema right entire leg TECHNOLOGIST PROVIDED HISTORY: What reading provider will be dictating this exam?->CRC FINDINGS: The visualized veins of the right lower extremity are patent and free of echogenic thrombus. The veins demonstrate good compressibility with normal color flow study and spectral analysis.     No evidence of DVT in the right lower extremity.     XR KNEE RIGHT (1-2 VIEWS)  Result Date: 2025  EXAMINATION: TWO XRAY VIEWS OF THE RIGHT KNEE 2025 7:43 am COMPARISON: X-ray dated 2025 HISTORY: ORDERING SYSTEM PROVIDED HISTORY: Primary osteoarthritis of right knee FINDINGS: AP and lateral views of the right knee reveal right knee arthroplasty in place without evidence of loosening or failure.  No periprosthetic 
  Step-ups - FWD    TA   Step-ups - LAT   TA   Step-ups - BWD    TA   Step up and over reciprocally    TA   [] TG  [] Leg Press 2-leg   TE   [] TG  [] Leg Press 1-leg   TE   CR    TE   Knee Extension Machine   TE   Marching gait   NR   Side stepping   NR               A: Tolerated well.    P: Continue with rehab plan  Aldair Leavitt, PT    Treatment Charges: Mins Units   Initial Evaluation 20 1   Re-Evaluation     Ther Exercise         TE 25 2   Manual Therapy     MT     Ther Activities        TA     Gait Training          GT     Neuro Re-education NR     Vasopneumatic compression 15 1   Non-Billable Service Time     Other     Total Time/Units 60 4

## 2025-06-03 DIAGNOSIS — Z96.651 S/P TOTAL KNEE ARTHROPLASTY, RIGHT: ICD-10-CM

## 2025-06-03 RX ORDER — OXYCODONE HYDROCHLORIDE 10 MG/1
10 TABLET ORAL EVERY 6 HOURS PRN
Qty: 28 TABLET | Refills: 0 | Status: SHIPPED | OUTPATIENT
Start: 2025-06-03 | End: 2025-06-10

## 2025-06-04 ENCOUNTER — TREATMENT (OUTPATIENT)
Dept: PHYSICAL THERAPY | Age: 65
End: 2025-06-04
Payer: MEDICARE

## 2025-06-04 DIAGNOSIS — Z96.651 S/P TOTAL KNEE ARTHROPLASTY, RIGHT: Primary | ICD-10-CM

## 2025-06-04 DIAGNOSIS — R79.89 LOW SERUM SODIUM: ICD-10-CM

## 2025-06-04 DIAGNOSIS — D64.9 ANEMIA, UNSPECIFIED TYPE: ICD-10-CM

## 2025-06-04 LAB
ALBUMIN: 4.2 G/DL (ref 3.5–5.2)
ALP BLD-CCNC: 88 U/L (ref 40–129)
ALT SERPL-CCNC: 25 U/L (ref 0–40)
ANION GAP SERPL CALCULATED.3IONS-SCNC: 17 MMOL/L (ref 7–16)
AST SERPL-CCNC: 26 U/L (ref 0–39)
BASOPHILS ABSOLUTE: 0.04 K/UL (ref 0–0.2)
BASOPHILS RELATIVE PERCENT: 1 % (ref 0–2)
BILIRUB SERPL-MCNC: 0.5 MG/DL (ref 0–1.2)
BILIRUBIN DIRECT: <0.2 MG/DL (ref 0–0.3)
BILIRUBIN, INDIRECT: NORMAL MG/DL (ref 0–1)
BUN BLDV-MCNC: 13 MG/DL (ref 6–23)
CALCIUM SERPL-MCNC: 9.6 MG/DL (ref 8.6–10.2)
CHLORIDE BLD-SCNC: 100 MMOL/L (ref 98–107)
CO2: 21 MMOL/L (ref 22–29)
CREAT SERPL-MCNC: 0.7 MG/DL (ref 0.7–1.2)
EOSINOPHILS ABSOLUTE: 0.16 K/UL (ref 0.05–0.5)
EOSINOPHILS RELATIVE PERCENT: 2 % (ref 0–6)
GFR, ESTIMATED: >90 ML/MIN/1.73M2
GLUCOSE BLD-MCNC: 86 MG/DL (ref 74–99)
HCT VFR BLD CALC: 37.5 % (ref 37–54)
HEMOGLOBIN: 12.5 G/DL (ref 12.5–16.5)
IMMATURE GRANULOCYTES %: 0 % (ref 0–5)
IMMATURE GRANULOCYTES ABSOLUTE: 0.03 K/UL (ref 0–0.58)
LYMPHOCYTES ABSOLUTE: 1.52 K/UL (ref 1.5–4)
LYMPHOCYTES RELATIVE PERCENT: 19 % (ref 20–42)
MCH RBC QN AUTO: 32.1 PG (ref 26–35)
MCHC RBC AUTO-ENTMCNC: 33.3 G/DL (ref 32–34.5)
MCV RBC AUTO: 96.2 FL (ref 80–99.9)
MONOCYTES ABSOLUTE: 0.76 K/UL (ref 0.1–0.95)
MONOCYTES RELATIVE PERCENT: 10 % (ref 2–12)
NEUTROPHILS ABSOLUTE: 5.41 K/UL (ref 1.8–7.3)
NEUTROPHILS RELATIVE PERCENT: 68 % (ref 43–80)
PDW BLD-RTO: 14.4 % (ref 11.5–15)
PLATELET # BLD: 226 K/UL (ref 130–450)
PMV BLD AUTO: 11.2 FL (ref 7–12)
POTASSIUM SERPL-SCNC: 4.5 MMOL/L (ref 3.5–5)
RBC # BLD: 3.9 M/UL (ref 3.8–5.8)
SODIUM BLD-SCNC: 138 MMOL/L (ref 132–146)
TOTAL PROTEIN: 6.9 G/DL (ref 6.4–8.3)
WBC # BLD: 7.9 K/UL (ref 4.5–11.5)

## 2025-06-04 PROCEDURE — 97110 THERAPEUTIC EXERCISES: CPT

## 2025-06-04 NOTE — PROGRESS NOTES
Physical Therapy Daily Treatment Note    Date: 2025  Patient Name: Shaheed Clayton  : 1960   MRN: [id number]  DOInjury:   DOSx: 2025   Referring Provider: [place] [address]     Medical Diagnosis:      Diagnosis Orders   1. S/P total knee arthroplasty, right            Outcome Measure:      X = TO BE PERFORMED NEXT VISIT  > = PROGRESS TO THIS FIRST  >> = PROGRESS TO THIS SECOND  >>> = PROGRESS TO THIS THIRD    S: Pt reports 4/10 right knee pain. States knee is very tight.  O: Discussed anatomy, physiology, body mechanics, principles of loading, and progressive loading/activity.  Reviewed home exercise program extensively along with instructions for ice and elevation; written copy provided.     Access Code: [email or web address]  URL: [email or web address]  Date: 2025  Prepared by: Aldair Leavitt    Exercises  - Supine Heel Slides  - 2 x daily - 7 x weekly - 2 sets - 20 reps  - Small Range Straight Leg Raise  - 2 x daily - 7 x weekly - 2 sets - 10 reps - 2 hold  - Supine Knee Extension Strengthening  - 2 x daily - 7 x weekly - 2 sets - 10 reps - 3 sec hold  - Supine Knee Extension Stretch on Towel Roll  - 2 x daily - 7 x weekly - 6 minutes hold  - Seated Long Arc Quad  - 2 x daily - 7 x weekly - 2 sets - 10 reps - 3 sec hold    Time  3318-7469 Repeat outcome measure at mid point and end.     Visit  25  Knee:  Right:   AROM: 75° Flexion,  -8° Extension  PROM: Left:   AROM: 138° Flexion,  0° Extension  PROM: 140° Flexion,  0° Extension Knee:  Right:   AROM: 75° Flex  -18° Ext  PROM: 77° Flex,  -10° Ext      Pain    Pain 4/10     ROM       Modalities       Ice and compression   MO   Manual      Stretching knee flexion   MT   Stretching       Patella mobs X  TE   Prone hangs   TE   Heel props 2 x 2 min  TE   Knee flex stretch-seated 4 x 30 sec  TE   Prone self flexion stretch   TE   Exercise       Nustep  L5 x 10 min  TE   Quad sets 5 sec hold 3 x 10 reps  TE

## 2025-06-09 ENCOUNTER — TREATMENT (OUTPATIENT)
Dept: PHYSICAL THERAPY | Age: 65
End: 2025-06-09
Payer: MEDICARE

## 2025-06-09 DIAGNOSIS — Z96.651 S/P TOTAL KNEE ARTHROPLASTY, RIGHT: Primary | ICD-10-CM

## 2025-06-09 PROCEDURE — 97110 THERAPEUTIC EXERCISES: CPT

## 2025-06-09 NOTE — PROGRESS NOTES
min  10 x 10 sec hold in flexion  TE   Quad sets 5 sec hold 3 x 10 reps Pushing into towel TE   Heel slides 2 x 15  TE   SLR 3 x 10  TE   LAQ 3 x 10  TE   Marching   TA   Squat    TA   Step-ups - FWD    TA   Step-ups - LAT   TA   Step-ups - BWD    TA   Step up and over reciprocally    TA   [x] TG  [] Leg Press 2-leg L17 3 x 10  TE   [] TG  [] Leg Press 1-leg   TE   CR    TE   Knee Extension Machine   TE   Marching gait   NR   Side stepping   NR               A: Tolerated well.  Small improvement in both flexion and extension.  Strongly encouraged patient to stretch knee in both flexion and extension 2 -3 times a day to improve his ROM; pt verbalized understanding.  P: Continue with rehab plan  Julia Sierra PTA    Treatment Charges: Mins Units   Initial Evaluation     Re-Evaluation     Ther Exercise         TE 30 2   Manual Therapy     MT     Ther Activities        TA     Gait Training          GT     Neuro Re-education NR     Vasopneumatic compression     Non-Billable Service Time 10 0   Other     Total Time/Units 40 2

## 2025-06-11 DIAGNOSIS — Z96.651 S/P TOTAL KNEE ARTHROPLASTY, RIGHT: Primary | ICD-10-CM

## 2025-06-11 RX ORDER — OXYCODONE HYDROCHLORIDE 10 MG/1
10 TABLET ORAL EVERY 6 HOURS PRN
Qty: 28 TABLET | Refills: 0 | Status: SHIPPED | OUTPATIENT
Start: 2025-06-11 | End: 2025-06-18

## 2025-06-12 ENCOUNTER — TREATMENT (OUTPATIENT)
Dept: PHYSICAL THERAPY | Age: 65
End: 2025-06-12

## 2025-06-12 DIAGNOSIS — Z96.651 S/P TOTAL KNEE ARTHROPLASTY, RIGHT: Primary | ICD-10-CM

## 2025-06-12 NOTE — PROGRESS NOTES
Physical Therapy Daily Treatment Note    Date: 2025  Patient Name: Shaheed Clayton  : 1960   MRN: [id number]  DOInjury:   DOSx: 2025   Referring Provider: [place] [address]     Medical Diagnosis:      Diagnosis Orders   1. S/P total knee arthroplasty, right              Outcome Measure:      X = TO BE PERFORMED NEXT VISIT  > = PROGRESS TO THIS FIRST  >> = PROGRESS TO THIS SECOND  >>> = PROGRESS TO THIS THIRD    S: Pt reports is still very painful with ex's .  O: Discussed anatomy, physiology, body mechanics, principles of loading, and progressive loading/activity.  Reviewed home exercise program extensively along with instructions for ice and elevation; written copy provided.     Access Code: [email or web address]  URL: [email or web address]  Date: 2025  Prepared by: Aldair Leavitt    Exercises  - Supine Heel Slides  - 2 x daily - 7 x weekly - 2 sets - 20 reps  - Small Range Straight Leg Raise  - 2 x daily - 7 x weekly - 2 sets - 10 reps - 2 hold  - Supine Knee Extension Strengthening  - 2 x daily - 7 x weekly - 2 sets - 10 reps - 3 sec hold  - Supine Knee Extension Stretch on Towel Roll  - 2 x daily - 7 x weekly - 6 minutes hold  - Seated Long Arc Quad  - 2 x daily - 7 x weekly - 2 sets - 10 reps - 3 sec hold    Time  2632-7984     Repeat outcome measure at mid point and end.     Visit    Knee:  Right:   AROM: 87° Flexion,  -7° Extension  PROM: 80° Flexion,  -3° Extension  Left:   AROM: 138° Flexion,  0° Extension  PROM: 140° Flexion,  0° Extension Knee:  Right:   AROM: 75° Flex  -18° Ext  PROM: 77° Flex,  -10° Ext      Pain    Pain 5/10     ROM       Modalities       Ice and compression   MO   Manual      Stretching knee flexion   MT   Stretching       Patella mobs X 20 ea dir  TE   Prone hangs   TE   Heel props 2 x 2 min  TE   Knee flex stretch-seated 4 x 30 sec  TE   Prone self flexion stretch   TE   Exercise       Nustep  L5 x 10

## 2025-06-13 PROBLEM — Z01.818 PRE-OP EVALUATION: Status: RESOLVED | Noted: 2025-05-14 | Resolved: 2025-06-13

## 2025-06-13 PROBLEM — Z98.890 POST-OPERATIVE STATE: Status: RESOLVED | Noted: 2025-05-14 | Resolved: 2025-06-13

## 2025-06-17 ENCOUNTER — OFFICE VISIT (OUTPATIENT)
Dept: ORTHOPEDIC SURGERY | Age: 65
End: 2025-06-17
Payer: MEDICARE

## 2025-06-17 ENCOUNTER — TREATMENT (OUTPATIENT)
Dept: PHYSICAL THERAPY | Age: 65
End: 2025-06-17
Payer: MEDICARE

## 2025-06-17 VITALS — TEMPERATURE: 98 F | HEIGHT: 70 IN | BODY MASS INDEX: 25.34 KG/M2 | WEIGHT: 177 LBS

## 2025-06-17 DIAGNOSIS — Z96.651 S/P TOTAL KNEE ARTHROPLASTY, RIGHT: Primary | ICD-10-CM

## 2025-06-17 DIAGNOSIS — M17.12 PRIMARY OSTEOARTHRITIS OF LEFT KNEE: ICD-10-CM

## 2025-06-17 PROCEDURE — 99024 POSTOP FOLLOW-UP VISIT: CPT | Performed by: ORTHOPAEDIC SURGERY

## 2025-06-17 PROCEDURE — 20610 DRAIN/INJ JOINT/BURSA W/O US: CPT | Performed by: ORTHOPAEDIC SURGERY

## 2025-06-17 PROCEDURE — 97110 THERAPEUTIC EXERCISES: CPT

## 2025-06-17 RX ORDER — GABAPENTIN 100 MG/1
100 CAPSULE ORAL 3 TIMES DAILY
Qty: 90 CAPSULE | Refills: 0 | Status: SHIPPED | OUTPATIENT
Start: 2025-06-17 | End: 2025-07-17

## 2025-06-17 NOTE — PROGRESS NOTES
Post-Operative week: 5 Status Post right Total Knee Arthroplasty, DOS: 5/14/25  Systemic or Specific Complaints:No Complaints. He is currently in PT and his pain is improving slightly. He is ambulating with cane.     Objective:     General: alert, appears stated age and cooperative   Wound: Wound clean and dry no evidence of infection., No Erythema, No Edema and No Drainage   Motion: Flexion: 3 to 90 Degrees   DVT Exam: No evidence of DVT seen on physical exam.  Negative Elsy's sign.  No cords or calf tenderness.  No significant calf/ankle edema.       Xrays:  No signs of fracture, there is good alignment, and no signs of aseptic loosening.       Assessment:     Encounter Diagnoses   Name Primary?    S/P total knee arthroplasty, right Yes    Primary osteoarthritis of left knee       Doing well postoperatively.     Plan:     Continues current post-op course  Patient is to discontinue taking enteric coated aspirin 325mg.    Patient is to discontinue wearing DWAYNE hose.    Continue with outpatient physical therapy.    Follow up next week after PT for ROM check.       I will proceed with a cortisone injection in the Left knee.  Verbal and written consent was obtained for the injections. The skin was prepped with alcohol.  A prepared mixture of 32 mg of Zilretta and 5mL diluent was injected to Left knee. The injection was given through the lateral side of the knee. The patient tolerated the injection well. I will see the patient back prn.    Shaheed \"Estevan\" was seen today for knee pain.    Diagnoses and all orders for this visit:    S/P total knee arthroplasty, right  -     gabapentin (NEURONTIN) 100 MG capsule; Take 1 capsule by mouth 3 times daily for 30 days. Intended supply: 30 days    Primary osteoarthritis of left knee  -     triamcinolone acetonide (ZILRETTA) intra-articular injection 32 mg

## 2025-06-17 NOTE — PROGRESS NOTES
Physical Therapy Daily Treatment Note    Date: 2025  Patient Name: Shaheed Clayton  : 1960   MRN: [id number]  DOInjury:   DOSx: 2025   Referring Provider: [place] [address]     Medical Diagnosis:      Diagnosis Orders   1. S/P total knee arthroplasty, right                Outcome Measure:      X = TO BE PERFORMED NEXT VISIT  > = PROGRESS TO THIS FIRST  >> = PROGRESS TO THIS SECOND  >>> = PROGRESS TO THIS THIRD    S: Pt reports is still very painful with ex's .  O: Discussed anatomy, physiology, body mechanics, principles of loading, and progressive loading/activity.  Reviewed home exercise program extensively along with instructions for ice and elevation; written copy provided.     Access Code: [email or web address]  URL: [email or web address]  Date: 2025  Prepared by: Aldair Leavitt    Exercises  - Supine Heel Slides  - 2 x daily - 7 x weekly - 2 sets - 20 reps  - Small Range Straight Leg Raise  - 2 x daily - 7 x weekly - 2 sets - 10 reps - 2 hold  - Supine Knee Extension Strengthening  - 2 x daily - 7 x weekly - 2 sets - 10 reps - 3 sec hold  - Supine Knee Extension Stretch on Towel Roll  - 2 x daily - 7 x weekly - 6 minutes hold  - Seated Long Arc Quad  - 2 x daily - 7 x weekly - 2 sets - 10 reps - 3 sec hold    Time  2226-9653 am      Repeat outcome measure at mid point and end.     Visit                 -18 visits        2025   Knee:  Right:   AROM: 87° Flexion, -5  ° Extension  PROM: 80° Flexion,  -3° Extension  Left:   AROM: 138° Flexion,  0° Extension  PROM: 140° Flexion,  0° Extension Knee:  Right:   AROM: 75° Flex  -18° Ext  PROM: 77° Flex,  -10° Ext      Pain    Pain 5/10     ROM       Modalities       Ice and compression   MO   Manual      Stretching knee flexion   MT   Stretching       Patella mobs X 20 ea dir  TE   Prone hangs   TE   Heel props 2 x 2 min  TE   Knee flex stretch-seated 4 x 1 min each   TE   Prone self flexion stretch

## 2025-06-18 DIAGNOSIS — Z96.651 S/P TOTAL KNEE ARTHROPLASTY, RIGHT: ICD-10-CM

## 2025-06-18 RX ORDER — OXYCODONE HYDROCHLORIDE 10 MG/1
10 TABLET ORAL EVERY 6 HOURS PRN
Qty: 28 TABLET | Refills: 0 | Status: SHIPPED | OUTPATIENT
Start: 2025-06-18 | End: 2025-06-25

## 2025-06-19 ENCOUNTER — TREATMENT (OUTPATIENT)
Dept: PHYSICAL THERAPY | Age: 65
End: 2025-06-19
Payer: MEDICARE

## 2025-06-19 DIAGNOSIS — Z96.651 S/P TOTAL KNEE ARTHROPLASTY, RIGHT: Primary | ICD-10-CM

## 2025-06-19 PROCEDURE — 97110 THERAPEUTIC EXERCISES: CPT

## 2025-06-19 NOTE — PROGRESS NOTES
Physical Therapy Daily Treatment Note    Date: 2025  Patient Name: Shaheed Clayton  : 1960   MRN: [id number]  DOInjury:   DOSx: 2025   Referring Provider: [place] [address]     Medical Diagnosis:      Diagnosis Orders   1. S/P total knee arthroplasty, right          Outcome Measure:      X = TO BE PERFORMED NEXT VISIT  > = PROGRESS TO THIS FIRST  >> = PROGRESS TO THIS SECOND  >>> = PROGRESS TO THIS THIRD    S: Pt reports is still very painful with ex's .  O: Discussed anatomy, physiology, body mechanics, principles of loading, and progressive loading/activity.  Reviewed home exercise program extensively along with instructions for ice and elevation; written copy provided.     Access Code: [email or web address]  URL: [email or web address]  Date: 2025  Prepared by: Aldair Leavitt    Exercises  - Supine Heel Slides  - 2 x daily - 7 x weekly - 2 sets - 20 reps  - Small Range Straight Leg Raise  - 2 x daily - 7 x weekly - 2 sets - 10 reps - 2 hold  - Supine Knee Extension Strengthening  - 2 x daily - 7 x weekly - 2 sets - 10 reps - 3 sec hold  - Supine Knee Extension Stretch on Towel Roll  - 2 x daily - 7 x weekly - 6 minutes hold  - Seated Long Arc Quad  - 2 x daily - 7 x weekly - 2 sets - 10 reps - 3 sec hold    Time  5437-5704      Repeat outcome measure at mid point and end.     Visit                -18 visits        2025   Knee:  Right:   AROM: 87° Flexion, -5  ° Extension  PROM: 80° Flexion,  -3° Extension  Left:   AROM: 138° Flexion,  0° Extension  PROM: 140° Flexion,  0° Extension Knee:  Right:   AROM: 75° Flex  -18° Ext  PROM: 77° Flex,  -10° Ext      Pain    Pain 5/10     ROM       Modalities       Ice and compression   MO   Manual      Stretching knee flexion   MT   Stretching       Patella mobs X 20 ea dir through out session  TE   Prone hangs   TE   Heel props  TE   Knee flex stretch-seated 4 x 1 min each   TE   Prone self flexion stretch

## 2025-06-23 ENCOUNTER — ANESTHESIA EVENT (OUTPATIENT)
Dept: OPERATING ROOM | Age: 65
End: 2025-06-23
Payer: MEDICARE

## 2025-06-23 ENCOUNTER — TREATMENT (OUTPATIENT)
Dept: PHYSICAL THERAPY | Age: 65
End: 2025-06-23
Payer: MEDICARE

## 2025-06-23 ENCOUNTER — PREP FOR PROCEDURE (OUTPATIENT)
Dept: ORTHOPEDIC SURGERY | Age: 65
End: 2025-06-23

## 2025-06-23 ENCOUNTER — OFFICE VISIT (OUTPATIENT)
Dept: ORTHOPEDIC SURGERY | Age: 65
End: 2025-06-23

## 2025-06-23 VITALS — BODY MASS INDEX: 25.34 KG/M2 | HEIGHT: 70 IN | WEIGHT: 177 LBS

## 2025-06-23 DIAGNOSIS — M76.891 ADHESIVE CAPSULITIS OF KNEE, RIGHT: ICD-10-CM

## 2025-06-23 DIAGNOSIS — Z96.651 S/P TOTAL KNEE ARTHROPLASTY, RIGHT: Primary | ICD-10-CM

## 2025-06-23 DIAGNOSIS — M24.661 ARTHROFIBROSIS OF KNEE JOINT, RIGHT: ICD-10-CM

## 2025-06-23 PROCEDURE — 99024 POSTOP FOLLOW-UP VISIT: CPT | Performed by: ORTHOPAEDIC SURGERY

## 2025-06-23 PROCEDURE — 97110 THERAPEUTIC EXERCISES: CPT

## 2025-06-23 NOTE — ANESTHESIA PRE PROCEDURE
Department of Anesthesiology  Preprocedure Note       Name:  Shaheed Clayton   Age:  65 y.o.  :  1960                                          MRN:  [id number]         Date:  2025      Surgeon: Surgeon(s):  Huber Christiansen DO    Procedure: Procedure(s):  RIGHT KNEE MANIPULATION UNDER ANESTHESIA-25    Medications prior to admission:   Prior to Admission medications    Medication Sig Start Date End Date Taking? Authorizing Provider   oxyCODONE HCl (OXY-IR) 10 MG immediate release tablet Take 1 tablet by mouth every 6 hours as needed for Pain for up to 7 days. Intended supply: 7 days Max Daily Amount: 40 mg  Patient taking differently: Take 1 tablet by mouth every 6 hours as needed for Pain. Intended supply: 7 days  Is only taking 5mg 25 Yes Janki Larios PA-C   gabapentin (NEURONTIN) 100 MG capsule Take 1 capsule by mouth 3 times daily for 30 days. Intended supply: 30 days  Patient taking differently: Take 5 capsules by mouth daily. Intended supply: 30 days  PT STATES HE IS WEANING OFF 25 Yes Huber Christiansen DO   aspirin 81 MG tablet Take 1 tablet by mouth in the morning and at bedtime Pt not to hold per cardiologist 25 Yes Joseph Escalante,    atenolol (TENORMIN) 25 MG tablet Take 1 tablet by mouth every morning 25 Yes Aster West MD   hydroCHLOROthiazide 12.5 MG capsule Take 1 capsule by mouth every evening 24  Yes Aster West MD   olmesartan (BENICAR) 40 MG tablet Take 1 tablet by mouth every evening 24  Yes Aster West MD   Zinc 15 MG CAPS Take 30 mg by mouth daily   Yes ProviderDerrick MD   Multiple Vitamins-Minerals (MULTIVITAL-M PO) Take  by mouth. Shaklee vits   Yes Derrick Stack MD   tadalafil (CIALIS) 5 MG tablet Take 1 tablet by mouth daily 25   Derrick Stack MD   nystatin-triamcinolone (MYCOLOG II) 677776-4.1 UNIT/GM-% cream Apply topically 2 times daily. 3/27/25

## 2025-06-23 NOTE — PROGRESS NOTES
Physical Therapy Daily Treatment Note    Date: 2025  Patient Name: Shaheed Clayton  : 1960   MRN: [id number]  DOInjury:   DOSx: 2025   Referring Provider: [place] [address]     Medical Diagnosis:      Diagnosis Orders   1. S/P total knee arthroplasty, right          Outcome Measure:      X = TO BE PERFORMED NEXT VISIT  > = PROGRESS TO THIS FIRST  >> = PROGRESS TO THIS SECOND  >>> = PROGRESS TO THIS THIRD    S: Pt reports is still very painful with ex's .  O: Discussed anatomy, physiology, body mechanics, principles of loading, and progressive loading/activity.  Reviewed home exercise program extensively along with instructions for ice and elevation; written copy provided.     Access Code: [email or web address]  URL: [email or web address]  Date: 2025  Prepared by: Aldair Leavitt    Exercises  - Supine Heel Slides  - 2 x daily - 7 x weekly - 2 sets - 20 reps  - Small Range Straight Leg Raise  - 2 x daily - 7 x weekly - 2 sets - 10 reps - 2 hold  - Supine Knee Extension Strengthening  - 2 x daily - 7 x weekly - 2 sets - 10 reps - 3 sec hold  - Supine Knee Extension Stretch on Towel Roll  - 2 x daily - 7 x weekly - 6 minutes hold  - Seated Long Arc Quad  - 2 x daily - 7 x weekly - 2 sets - 10 reps - 3 sec hold    Time  2403-6940     Repeat outcome measure at mid point and end.     Visit                -18 visits        2025   Knee:  Right:   AROM:  90° Flexion, -5  ° Extension  PROM: 80° Flexion,  -3° Extension  Left:   AROM: 138° Flexion,  0° Extension  PROM: 140° Flexion,  0° Extension Knee:  Right:   AROM: 75° Flex  -18° Ext  PROM: 77° Flex,  -10° Ext      Pain    Pain 5/10     ROM       Modalities       Ice and compression   MO   Manual      Stretching knee flexion   MT   Stretching       Patella mobs X 20 ea dir through out session  TE   Prone hangs   TE   Heel props  TE   Knee flex stretch-seated 4 x 1 min each   TE   Prone self flexion stretch

## 2025-06-23 NOTE — PROGRESS NOTES
Post-Operative week: 6 Status Post right Total Knee Arthroplasty, DOS: 5/14/25  Systemic or Specific Complaints:No Complaints. He is ambulating with cane. He stated he is still struggling with range of motion.     Objective:     General: alert, appears stated age and cooperative   Wound: Wound clean and dry no evidence of infection., No Erythema, No Edema and No Drainage   Motion: Flexion: 0 to 90 Degrees   DVT Exam: No evidence of DVT seen on physical exam.  Negative Elsy's sign.  No cords or calf tenderness.  No significant calf/ankle edema.       Xrays:  No signs of fracture, there is good alignment, and no signs of aseptic loosening.     _Ht 1.778 m (5' 10\")   Wt 80.3 kg (177 lb)   BMI 25.40 kg/m²  Vital signs are stable.  In general, patient is awake, alert and oriented X3, in no apparent distress.  Examination of HENT reveals normocephalic, atraumatic.  PERRLA/EOMI sclera are white.  Conjunctivae are clear.  TM's are intact.  Pharynx is pink and moist.  Uvula and tongue are midline.  Heart: Positive S1 and positive S2 with regular rate and rhythm.  Lungs: Clear to auscultation bilaterally without rales, rhonchi or wheezes.  Abdomen: soft, nontender.  Positive bowel sounds.  No organomegaly.  No guarding or rigidity.    Assessment:     Encounter Diagnoses   Name Primary?    S/P total knee arthroplasty, right Yes    Adhesive capsulitis of knee, right       Doing well postoperatively.     Plan:     Continues current post-op course  Patient is to discontinue taking enteric coated aspirin 325mg.    Patient is to discontinue wearing DWAYNE hose.    Continue with outpatient physical therapy.     I will perform an RAOUL of the right knee 6/24/25.  The risks and benefits were discussed with the patient.  The risks include but are not limited to: intraoperative fracture, injuries to blood vessels and nerves, non relief of symptoms, PE/DVT, MI and death.  The patient understands these risks and wishes to proceed with

## 2025-06-24 ENCOUNTER — ANESTHESIA (OUTPATIENT)
Dept: OPERATING ROOM | Age: 65
End: 2025-06-24
Payer: MEDICARE

## 2025-06-24 ENCOUNTER — HOSPITAL ENCOUNTER (OUTPATIENT)
Age: 65
Setting detail: OUTPATIENT SURGERY
Discharge: HOME | End: 2025-06-24
Attending: ORTHOPAEDIC SURGERY | Admitting: ORTHOPAEDIC SURGERY
Payer: MEDICARE

## 2025-06-24 ENCOUNTER — TREATMENT (OUTPATIENT)
Dept: PHYSICAL THERAPY | Age: 65
End: 2025-06-24
Payer: MEDICARE

## 2025-06-24 VITALS
BODY MASS INDEX: 24.2 KG/M2 | RESPIRATION RATE: 16 BRPM | SYSTOLIC BLOOD PRESSURE: 136 MMHG | HEART RATE: 70 BPM | WEIGHT: 169 LBS | TEMPERATURE: 97.5 F | HEIGHT: 70 IN | DIASTOLIC BLOOD PRESSURE: 80 MMHG | OXYGEN SATURATION: 97 %

## 2025-06-24 DIAGNOSIS — Z96.651 S/P TOTAL KNEE ARTHROPLASTY, RIGHT: Primary | ICD-10-CM

## 2025-06-24 PROCEDURE — 97110 THERAPEUTIC EXERCISES: CPT

## 2025-06-24 PROCEDURE — 2580000003 HC RX 258: Performed by: ANESTHESIOLOGY

## 2025-06-24 PROCEDURE — 6370000000 HC RX 637 (ALT 250 FOR IP): Performed by: ANESTHESIOLOGY

## 2025-06-24 PROCEDURE — 7100000010 HC PHASE II RECOVERY - FIRST 15 MIN: Performed by: ORTHOPAEDIC SURGERY

## 2025-06-24 PROCEDURE — 7100000001 HC PACU RECOVERY - ADDTL 15 MIN: Performed by: ORTHOPAEDIC SURGERY

## 2025-06-24 PROCEDURE — 97016 VASOPNEUMATIC DEVICE THERAPY: CPT

## 2025-06-24 PROCEDURE — 2709999900 HC NON-CHARGEABLE SUPPLY: Performed by: ORTHOPAEDIC SURGERY

## 2025-06-24 PROCEDURE — 3700000000 HC ANESTHESIA ATTENDED CARE: Performed by: ORTHOPAEDIC SURGERY

## 2025-06-24 PROCEDURE — 6360000002 HC RX W HCPCS: Performed by: ORTHOPAEDIC SURGERY

## 2025-06-24 PROCEDURE — 6360000002 HC RX W HCPCS: Performed by: NURSE ANESTHETIST, CERTIFIED REGISTERED

## 2025-06-24 PROCEDURE — 3600000012 HC SURGERY LEVEL 2 ADDTL 15MIN: Performed by: ORTHOPAEDIC SURGERY

## 2025-06-24 PROCEDURE — 3700000001 HC ADD 15 MINUTES (ANESTHESIA): Performed by: ORTHOPAEDIC SURGERY

## 2025-06-24 PROCEDURE — 27570 FIXATION OF KNEE JOINT: CPT | Performed by: ORTHOPAEDIC SURGERY

## 2025-06-24 PROCEDURE — 7100000011 HC PHASE II RECOVERY - ADDTL 15 MIN: Performed by: ORTHOPAEDIC SURGERY

## 2025-06-24 PROCEDURE — 7100000000 HC PACU RECOVERY - FIRST 15 MIN: Performed by: ORTHOPAEDIC SURGERY

## 2025-06-24 PROCEDURE — 3600000002 HC SURGERY LEVEL 2 BASE: Performed by: ORTHOPAEDIC SURGERY

## 2025-06-24 RX ORDER — OXYCODONE AND ACETAMINOPHEN 5; 325 MG/1; MG/1
1 TABLET ORAL EVERY 4 HOURS PRN
Refills: 0 | Status: DISCONTINUED | OUTPATIENT
Start: 2025-06-24 | End: 2025-06-24 | Stop reason: HOSPADM

## 2025-06-24 RX ORDER — DEXAMETHASONE SODIUM PHOSPHATE 10 MG/ML
INJECTION, SOLUTION INTRAMUSCULAR; INTRAVENOUS
Status: DISCONTINUED | OUTPATIENT
Start: 2025-06-24 | End: 2025-06-24 | Stop reason: SDUPTHER

## 2025-06-24 RX ORDER — MIDAZOLAM HYDROCHLORIDE 1 MG/ML
INJECTION, SOLUTION INTRAMUSCULAR; INTRAVENOUS
Status: DISCONTINUED | OUTPATIENT
Start: 2025-06-24 | End: 2025-06-24 | Stop reason: SDUPTHER

## 2025-06-24 RX ORDER — MEPERIDINE HYDROCHLORIDE 25 MG/ML
12.5 INJECTION INTRAMUSCULAR; INTRAVENOUS; SUBCUTANEOUS ONCE
Status: DISCONTINUED | OUTPATIENT
Start: 2025-06-24 | End: 2025-06-24 | Stop reason: HOSPADM

## 2025-06-24 RX ORDER — HYDROCODONE BITARTRATE AND ACETAMINOPHEN 5; 325 MG/1; MG/1
1 TABLET ORAL EVERY 6 HOURS PRN
Status: DISCONTINUED | OUTPATIENT
Start: 2025-06-24 | End: 2025-06-24 | Stop reason: HOSPADM

## 2025-06-24 RX ORDER — NALOXONE HYDROCHLORIDE 0.4 MG/ML
INJECTION, SOLUTION INTRAMUSCULAR; INTRAVENOUS; SUBCUTANEOUS PRN
Status: DISCONTINUED | OUTPATIENT
Start: 2025-06-24 | End: 2025-06-24 | Stop reason: HOSPADM

## 2025-06-24 RX ORDER — METOCLOPRAMIDE 5 MG/1
10 TABLET ORAL ONCE
Status: COMPLETED | OUTPATIENT
Start: 2025-06-24 | End: 2025-06-24

## 2025-06-24 RX ORDER — SODIUM CHLORIDE 9 MG/ML
INJECTION, SOLUTION INTRAVENOUS PRN
Status: DISCONTINUED | OUTPATIENT
Start: 2025-06-24 | End: 2025-06-24 | Stop reason: HOSPADM

## 2025-06-24 RX ORDER — ONDANSETRON 2 MG/ML
INJECTION INTRAMUSCULAR; INTRAVENOUS
Status: DISCONTINUED | OUTPATIENT
Start: 2025-06-24 | End: 2025-06-24 | Stop reason: SDUPTHER

## 2025-06-24 RX ORDER — BUPIVACAINE HYDROCHLORIDE 2.5 MG/ML
INJECTION, SOLUTION EPIDURAL; INFILTRATION; INTRACAUDAL; PERINEURAL PRN
Status: DISCONTINUED | OUTPATIENT
Start: 2025-06-24 | End: 2025-06-24 | Stop reason: ALTCHOICE

## 2025-06-24 RX ORDER — MORPHINE SULFATE 2 MG/ML
1 INJECTION, SOLUTION INTRAMUSCULAR; INTRAVENOUS EVERY 5 MIN PRN
Status: DISCONTINUED | OUTPATIENT
Start: 2025-06-24 | End: 2025-06-24 | Stop reason: HOSPADM

## 2025-06-24 RX ORDER — SODIUM CHLORIDE, SODIUM LACTATE, POTASSIUM CHLORIDE, CALCIUM CHLORIDE 600; 310; 30; 20 MG/100ML; MG/100ML; MG/100ML; MG/100ML
INJECTION, SOLUTION INTRAVENOUS CONTINUOUS
Status: DISCONTINUED | OUTPATIENT
Start: 2025-06-24 | End: 2025-06-24 | Stop reason: HOSPADM

## 2025-06-24 RX ORDER — FAMOTIDINE 20 MG/1
20 TABLET, FILM COATED ORAL ONCE
Status: COMPLETED | OUTPATIENT
Start: 2025-06-24 | End: 2025-06-24

## 2025-06-24 RX ORDER — FENTANYL CITRATE 0.05 MG/ML
50 INJECTION, SOLUTION INTRAMUSCULAR; INTRAVENOUS EVERY 5 MIN PRN
Status: DISCONTINUED | OUTPATIENT
Start: 2025-06-24 | End: 2025-06-24 | Stop reason: HOSPADM

## 2025-06-24 RX ORDER — PROPOFOL 10 MG/ML
INJECTION, EMULSION INTRAVENOUS
Status: DISCONTINUED | OUTPATIENT
Start: 2025-06-24 | End: 2025-06-24 | Stop reason: SDUPTHER

## 2025-06-24 RX ORDER — DROPERIDOL 2.5 MG/ML
0.62 INJECTION, SOLUTION INTRAMUSCULAR; INTRAVENOUS
Status: DISCONTINUED | OUTPATIENT
Start: 2025-06-24 | End: 2025-06-24 | Stop reason: HOSPADM

## 2025-06-24 RX ORDER — DIPHENHYDRAMINE HYDROCHLORIDE 50 MG/ML
12.5 INJECTION, SOLUTION INTRAMUSCULAR; INTRAVENOUS
Status: DISCONTINUED | OUTPATIENT
Start: 2025-06-24 | End: 2025-06-24 | Stop reason: HOSPADM

## 2025-06-24 RX ORDER — DIPHENHYDRAMINE HYDROCHLORIDE 50 MG/ML
INJECTION, SOLUTION INTRAMUSCULAR; INTRAVENOUS
Status: DISCONTINUED | OUTPATIENT
Start: 2025-06-24 | End: 2025-06-24 | Stop reason: SDUPTHER

## 2025-06-24 RX ORDER — SODIUM CHLORIDE 0.9 % (FLUSH) 0.9 %
5-40 SYRINGE (ML) INJECTION PRN
Status: DISCONTINUED | OUTPATIENT
Start: 2025-06-24 | End: 2025-06-24 | Stop reason: HOSPADM

## 2025-06-24 RX ORDER — SODIUM CHLORIDE 0.9 % (FLUSH) 0.9 %
5-40 SYRINGE (ML) INJECTION EVERY 12 HOURS SCHEDULED
Status: DISCONTINUED | OUTPATIENT
Start: 2025-06-24 | End: 2025-06-24 | Stop reason: HOSPADM

## 2025-06-24 RX ADMIN — PROPOFOL INJECTABLE EMULSION 150 MG: 10 INJECTION, EMULSION INTRAVENOUS at 08:04

## 2025-06-24 RX ADMIN — PROPOFOL INJECTABLE EMULSION 50 MG: 10 INJECTION, EMULSION INTRAVENOUS at 08:09

## 2025-06-24 RX ADMIN — METOCLOPRAMIDE 10 MG: 5 TABLET ORAL at 06:57

## 2025-06-24 RX ADMIN — DEXAMETHASONE SODIUM PHOSPHATE 10 MG: 10 INJECTION, SOLUTION INTRAMUSCULAR; INTRAVENOUS at 08:04

## 2025-06-24 RX ADMIN — PROPOFOL INJECTABLE EMULSION 50 MG: 10 INJECTION, EMULSION INTRAVENOUS at 08:05

## 2025-06-24 RX ADMIN — PROPOFOL INJECTABLE EMULSION 50 MG: 10 INJECTION, EMULSION INTRAVENOUS at 08:07

## 2025-06-24 RX ADMIN — DIPHENHYDRAMINE HYDROCHLORIDE 12.5 MG: 50 INJECTION INTRAMUSCULAR; INTRAVENOUS at 08:04

## 2025-06-24 RX ADMIN — HYDROMORPHONE HYDROCHLORIDE 0.2 MG: 1 INJECTION, SOLUTION INTRAMUSCULAR; INTRAVENOUS; SUBCUTANEOUS at 08:07

## 2025-06-24 RX ADMIN — OXYCODONE AND ACETAMINOPHEN 1 TABLET: 5; 325 TABLET ORAL at 09:05

## 2025-06-24 RX ADMIN — MIDAZOLAM 2 MG: 1 INJECTION INTRAMUSCULAR; INTRAVENOUS at 08:00

## 2025-06-24 RX ADMIN — FAMOTIDINE 20 MG: 20 TABLET, FILM COATED ORAL at 06:57

## 2025-06-24 RX ADMIN — HYDROMORPHONE HYDROCHLORIDE 0.3 MG: 1 INJECTION, SOLUTION INTRAMUSCULAR; INTRAVENOUS; SUBCUTANEOUS at 08:04

## 2025-06-24 RX ADMIN — ONDANSETRON 4 MG: 2 INJECTION, SOLUTION INTRAMUSCULAR; INTRAVENOUS at 08:04

## 2025-06-24 RX ADMIN — SODIUM CHLORIDE, SODIUM LACTATE, POTASSIUM CHLORIDE, AND CALCIUM CHLORIDE: .6; .31; .03; .02 INJECTION, SOLUTION INTRAVENOUS at 06:55

## 2025-06-24 ASSESSMENT — PAIN - FUNCTIONAL ASSESSMENT
PAIN_FUNCTIONAL_ASSESSMENT: 0-10

## 2025-06-24 ASSESSMENT — PAIN DESCRIPTION - ORIENTATION: ORIENTATION: RIGHT

## 2025-06-24 ASSESSMENT — PAIN SCALES - GENERAL: PAINLEVEL_OUTOF10: 7

## 2025-06-24 ASSESSMENT — PAIN DESCRIPTION - DESCRIPTORS: DESCRIPTORS: ACHING;DISCOMFORT

## 2025-06-24 ASSESSMENT — PAIN DESCRIPTION - LOCATION: LOCATION: KNEE

## 2025-06-24 ASSESSMENT — LIFESTYLE VARIABLES: SMOKING_STATUS: 0

## 2025-06-24 NOTE — ANESTHESIA POSTPROCEDURE EVALUATION
Department of Anesthesiology  Postprocedure Note    Patient: Shaheed Clayton  MRN: [id number]  YOB: 1960  Date of evaluation: 6/24/2025    Procedure Summary       Date: 06/24/25 Room / Location: [address]    Anesthesia Start: 0800 Anesthesia Stop: 0827    Procedure: RIGHT KNEE MANIPULATION UNDER ANESTHESIA-6/24/25 (Right: Knee) Diagnosis:       Arthrofibrosis of knee joint, right      (Arthrofibrosis of knee joint, right [M24.661])    Surgeons: Huber Christiansen DO Responsible Provider: Horacio Loja MD    Anesthesia Type: General ASA Status: 3            Anesthesia Type: General    Stefano Phase I: Stefnao Score: 10    Stefano Phase II: Stefano Score: 10    Anesthesia Post Evaluation    Patient location during evaluation: PACU  Patient participation: complete - patient participated  Level of consciousness: awake  Airway patency: patent  Nausea & Vomiting: no nausea and no vomiting  Cardiovascular status: hemodynamically stable  Respiratory status: room air and spontaneous ventilation  Hydration status: stable  Pain management: adequate (pain Rx in PACU)    No notable events documented.

## 2025-06-24 NOTE — DISCHARGE INSTRUCTIONS
[address]  [address]  Phone [phone]      Manipulation Post-Op Instructions    [facility]  [phone]  Huber Christiansen D.O.      Change your dressing on post-op day #1.            2.    May shower on post-op day #1.    3.  Follow-up with physical therapy as previously scheduled.    4.  Activity - Continue home exercise program.    5.  Take pain medications as prescribed.  Take with food.            If you anticipate the need for a refill on your medication and the weekend is approaching, please call the office by noon on Friday. No refills will    be called in  over the weekend.Do not take TYLENOL or Tylenol products while taking  prescribed pain medicine.     6.  Resume regular diet and medications (unless otherwise directed by your doctor.)          7.  You should have a responsible adult with you for 24 hours.    8.  If you have any questions or concerns, please call the office.      If any problems occur or if you have any further questions, please call your doctor as soon as possible. If you find that you cannot reach your doctor but feel that your condition needs a doctor’s attention go to an emergency room.      [address]  [address]  Phone [phone]      Manipulation Post-Op Instructions    [facility]  [phone]  Huber Christiansen D.O.      Change your dressing on post-op day #1.            2.    May shower on post-op day #1.    3.  Follow-up with physical therapy as previously scheduled.    4.  Activity - Continue home exercise program.    5.  Take pain medications as prescribed.  Take with food.            If you anticipate the need for a refill on your medication and the weekend is approaching, please call the office by noon on Friday. No refills will    be called in  over the weekend.Do not take TYLENOL or Tylenol  products while taking  prescribed pain medicine.     6.  Resume regular diet and medications (unless otherwise directed by your doctor.)          7.  You should have a responsible adult with you for 24 hours.    8.  If you have any questions or concerns, please call the office.      If any problems occur or if you have any further questions, please call your doctor as soon as possible. If you find that you cannot reach your doctor but feel that your condition needs a doctor’s attention go to an emergency room.      [place] [id number] [place] [address]  Phone [phone]      Manipulation Post-Op Instructions    [facility]  [phone]  Huber Christiansen D.O.      Change your dressing on post-op day #1.            2.    May shower on post-op day #1.    3.  Follow-up with physical therapy as previously scheduled.  To go to physical therapy after discharge from [facility]    4.  Activity - Continue home exercise program.    5.  Take pain medications as prescribed.  Take with food.            If you anticipate the need for a refill on your medication and the weekend is approaching, please call the office by noon on Friday. No refills will    be called in  over the weekend.Do not take TYLENOL or Tylenol products while taking  prescribed pain medicine.     6.  Resume regular diet and medications (unless otherwise directed by your doctor.)          7.  You should have a responsible adult with you for 24 hours.    8.  If you have any questions or concerns, please call the office.      If any problems occur or if you have any further questions, please call your doctor as soon as possible. If you find that you cannot reach your doctor but feel that your condition needs a doctor’s attention go to an emergency room.                 Nausea and Vomiting After Surgery: Care Instructions  Your Care Instructions     After you've had surgery, you may

## 2025-06-24 NOTE — PROGRESS NOTES
Physical Therapy Daily Treatment Note    Date: 2025  Patient Name: Shaheed Clayton  : 1960   MRN: [id number]  DOInjury:   DOSx: 2025   Referring Provider: [place] [address]     Medical Diagnosis:      Diagnosis Orders   1. S/P total knee arthroplasty, right            Outcome Measure:      X = TO BE PERFORMED NEXT VISIT  > = PROGRESS TO THIS FIRST  >> = PROGRESS TO THIS SECOND  >>> = PROGRESS TO THIS THIRD    S: Pt returns post manipulation. Wife present and thinks Dr Mclean achieved 130* flexion. Pt reports pain a good 6/10.   O: Discussed anatomy, physiology, body mechanics, principles of loading, and progressive loading/activity.  Reviewed home exercise program extensively along with instructions for ice and elevation; written copy provided.     Access Code: [email or web address]  URL: [email or web address]  Date: 2025  Prepared by: Aldair Leavitt    Exercises  - Supine Heel Slides  - 2 x daily - 7 x weekly - 2 sets - 20 reps  - Small Range Straight Leg Raise  - 2 x daily - 7 x weekly - 2 sets - 10 reps - 2 hold  - Supine Knee Extension Strengthening  - 2 x daily - 7 x weekly - 2 sets - 10 reps - 3 sec hold  - Supine Knee Extension Stretch on Towel Roll  - 2 x daily - 7 x weekly - 6 minutes hold  - Seated Long Arc Quad  - 2 x daily - 7 x weekly - 2 sets - 10 reps - 3 sec hold    Time  8290-7231     Repeat outcome measure at mid point and end.     Visit               -18 visits        2025   Knee:  Right:   AROM:  85° Flexion, -15  ° Extension  PROM: 95° Flexion,  -10° Extension  Left:   AROM: 138° Flexion,  0° Extension  PROM: 140° Flexion,  0° Extension Knee:  Right:   AROM: 75° Flex  -18° Ext  PROM: 77° Flex,  -10° Ext      Pain    Pain 5/10     ROM       Modalities       Ice and compression X 15 min 75mmHg  MO   Manual      Stretching knee flexion 3 x 30 sec with 10 LAQ in between  MT   Stretching       Patella mobs X 20 ea dir through out

## 2025-06-24 NOTE — OP NOTE
Operative Note      Patient: Shaheed Clayton  YOB: 1960  MRN: [id number]    Date of Procedure: 6/24/2025    Pre-Op Diagnosis Codes:      * Arthrofibrosis of knee joint, right [M24.661]    Post-Op Diagnosis: {MH OR SAME:657864972}       Procedure(s):  RIGHT KNEE MANIPULATION UNDER ANESTHESIA-6/24/25    Surgeon(s):  Huber Christiansen DO    Assistant:   * No surgical staff found *    Anesthesia: General    Estimated Blood Loss (mL): {NUMBERS; EBL:43602}    Complications: {Symptoms; Intra-op complications:42965}    Specimens:   * No specimens in log *    Implants:  * No implants in log *      Drains: * No LDAs found *    Findings:  Infection Present At Time Of Surgery (PATOS) (choose all levels that have infection present):  {PATOS LEVELS:889183453}  Other Findings: ***    Detailed Description of Procedure:   ***      PREOPERATIVE DIAGNOSIS:  {RIGHT/LEFT:16} knee arthrofibrosis.     POSTOPERATIVE DIAGNOSIS:  {RIGHT/LEFT:16} knee arthrofibrosis.     PROCEDURE:  {RIGHT/LEFT:16} knee manipulation under anesthesia with injection.     ANESTHESIA:  General.     SURGEON:  Huber Christiansen DO     ASSISTANT: ***     BLOOD LOSS:  None.     COMPLICATIONS:  None.     OPERATIVE PROCEDURE:  The patient was brought to operative suite and  was given general anesthesia.  The right leg was identified with  preoperative time-out.  The patient was then examined preoperatively,  had about *** degree contracture flexed to about *** degrees.  I was  able to manipulate the knee to get to about *** degrees.  I was  able to get his in full extension.  The patient did have moderate  crepitus with manipulation.  There were no signs of fracture but some  adhesions were probed in for sure.  I thoroughly irrigated the area,  then performed an injection of 0.25% Marcaine with the knee joint.    The patient recovered in the recovery room without difficulty.  The  patient tolerated the procedure well.     Electronically signed by Huber GANDHI

## 2025-06-25 ENCOUNTER — TREATMENT (OUTPATIENT)
Dept: PHYSICAL THERAPY | Age: 65
End: 2025-06-25
Payer: MEDICARE

## 2025-06-25 DIAGNOSIS — Z96.651 S/P TOTAL KNEE ARTHROPLASTY, RIGHT: ICD-10-CM

## 2025-06-25 DIAGNOSIS — M24.661 ARTHROFIBROSIS OF KNEE JOINT, RIGHT: Primary | ICD-10-CM

## 2025-06-25 DIAGNOSIS — Z96.651 S/P TOTAL KNEE ARTHROPLASTY, RIGHT: Primary | ICD-10-CM

## 2025-06-25 PROCEDURE — 97110 THERAPEUTIC EXERCISES: CPT

## 2025-06-25 PROCEDURE — 97140 MANUAL THERAPY 1/> REGIONS: CPT

## 2025-06-25 PROCEDURE — 97016 VASOPNEUMATIC DEVICE THERAPY: CPT

## 2025-06-30 ENCOUNTER — TREATMENT (OUTPATIENT)
Dept: PHYSICAL THERAPY | Age: 65
End: 2025-06-30
Payer: MEDICARE

## 2025-06-30 DIAGNOSIS — Z96.651 S/P TOTAL KNEE ARTHROPLASTY, RIGHT: Primary | ICD-10-CM

## 2025-06-30 PROCEDURE — 97110 THERAPEUTIC EXERCISES: CPT

## 2025-06-30 PROCEDURE — 97140 MANUAL THERAPY 1/> REGIONS: CPT

## 2025-06-30 NOTE — PROGRESS NOTES
Physical Therapy Daily Treatment Note    Date: 2025  Patient Name: Shaheed Clayton  : 1960   MRN: [id number]  DOInjury:   DOSx: 2025   Referring Provider: [place] [address]     Medical Diagnosis:      Diagnosis Orders   1. S/P total knee arthroplasty, right            Outcome Measure:      X = TO BE PERFORMED NEXT VISIT  > = PROGRESS TO THIS FIRST  >> = PROGRESS TO THIS SECOND  >>> = PROGRESS TO THIS THIRD    S: Pt reports O: Discussed anatomy, physiology, body mechanics, principles of loading, and progressive loading/activity.  Reviewed home exercise program extensively along with instructions for ice and elevation; written copy provided.     Access Code: [email or web address]  URL: [email or web address]  Date: 2025  Prepared by: Aldair Leavitt    Exercises  - Supine Heel Slides  - 2 x daily - 7 x weekly - 2 sets - 20 reps  - Small Range Straight Leg Raise  - 2 x daily - 7 x weekly - 2 sets - 10 reps - 2 hold  - Supine Knee Extension Strengthening  - 2 x daily - 7 x weekly - 2 sets - 10 reps - 3 sec hold  - Supine Knee Extension Stretch on Towel Roll  - 2 x daily - 7 x weekly - 6 minutes hold  - Seated Long Arc Quad  - 2 x daily - 7 x weekly - 2 sets - 10 reps - 3 sec hold    Time  9366-4525     Repeat outcome measure at mid point and end.     Visit               10 /12-18 visits        2025   Knee:  Right:   AROM:  88-90° Flexion, -  -8° Extension  PROM: 95° Flexion,  -10° Extension  Left:   AROM: 138° Flexion,  0° Extension  PROM: 140° Flexion,  0° Extension Knee:  Right:   AROM: 75° Flex  -18° Ext  PROM: 77° Flex,  -10° Ext      Pain    Pain 5/10     ROM       Modalities      Ice and compression  MO   Manual       X 10 min       Stretching knee flexion 3 x 30 sec with 10 LAQ in between  MT   Stretching       Patella mobs X 20 ea dir through out session  TE   Prone hangs   TE   Heel props in sitting  X 4 min   TE   Knee flex stretch-seated 4 x 1 min

## 2025-07-01 ENCOUNTER — OFFICE VISIT (OUTPATIENT)
Dept: ORTHOPEDIC SURGERY | Age: 65
End: 2025-07-01

## 2025-07-01 DIAGNOSIS — Z96.651 S/P TOTAL KNEE ARTHROPLASTY, RIGHT: Primary | ICD-10-CM

## 2025-07-01 DIAGNOSIS — M24.661 ARTHROFIBROSIS OF KNEE JOINT, RIGHT: ICD-10-CM

## 2025-07-01 PROCEDURE — 99024 POSTOP FOLLOW-UP VISIT: CPT | Performed by: ORTHOPAEDIC SURGERY

## 2025-07-01 RX ORDER — OXYCODONE HYDROCHLORIDE 10 MG/1
10 TABLET ORAL EVERY 6 HOURS PRN
Qty: 28 TABLET | Refills: 0 | Status: SHIPPED | OUTPATIENT
Start: 2025-07-01 | End: 2025-07-08

## 2025-07-01 NOTE — PROGRESS NOTES
Post-Operative week: 1 Status Post right Total Knee Arthroplasty with RAOUL, DOS: 6/24/25  Systemic or Specific Complaints:No Complaints. He is ambulating with cane. He stated he is currently in PT.     Objective:     General: alert, appears stated age and cooperative   Wound: Wound clean and dry no evidence of infection., No Erythema, No Edema and No Drainage   Motion: Flexion: 0 to 90 Degrees   DVT Exam: No evidence of DVT seen on physical exam.  Negative Elsy's sign.  No cords or calf tenderness.  No significant calf/ankle edema.       Xrays:  No signs of fracture, there is good alignment, and no signs of aseptic loosening.       Assessment:     Encounter Diagnoses   Name Primary?    S/P total knee arthroplasty, right Yes    Arthrofibrosis of knee joint, right       Doing well postoperatively.     Plan:     Continues current post-op course  Patient is to discontinue taking enteric coated aspirin 325mg.    Patient is to discontinue wearing DWAYNE hose.    Continue with outpatient physical therapy.   Follow up 2 weeks

## 2025-07-15 ENCOUNTER — OFFICE VISIT (OUTPATIENT)
Dept: ORTHOPEDIC SURGERY | Age: 65
End: 2025-07-15

## 2025-07-15 DIAGNOSIS — I10 ESSENTIAL HYPERTENSION: ICD-10-CM

## 2025-07-15 DIAGNOSIS — Z96.651 S/P TOTAL KNEE ARTHROPLASTY, RIGHT: Primary | ICD-10-CM

## 2025-07-15 PROCEDURE — 99024 POSTOP FOLLOW-UP VISIT: CPT | Performed by: ORTHOPAEDIC SURGERY

## 2025-07-15 RX ORDER — OXYCODONE HYDROCHLORIDE 10 MG/1
10 TABLET ORAL EVERY 6 HOURS PRN
Qty: 28 TABLET | Refills: 0 | Status: SHIPPED | OUTPATIENT
Start: 2025-07-15 | End: 2025-07-22

## 2025-07-15 NOTE — PROGRESS NOTES
the Last Year: Never true   Transportation Needs: No Transportation Needs (5/15/2025)    PRAPARE - Transportation     Lack of Transportation (Medical): No     Lack of Transportation (Non-Medical): No   Physical Activity: Sufficiently Active (7/31/2024)    Exercise Vital Sign     Days of Exercise per Week: 6 days     Minutes of Exercise per Session: 150+ min   Stress: Not on file   Social Connections: Not on file   Intimate Partner Violence: Not on file   Housing Stability: Low Risk  (5/15/2025)    Housing Stability Vital Sign     Unable to Pay for Housing in the Last Year: No     Number of Times Moved in the Last Year: 0     Homeless in the Last Year: No       Review of Systems:     Skin: (-) rash,(-) psoriasis,(-) eczema, (-)skin cancer.   Musculoskeletal: (-) fractures,  (-) dislocations,(-) collagen vascular disease, (-) fibromyalgia, (-) multiple sclerosis, (-) muscular dystrophy, (-) RSD,(-) joint pain (-)swelling, (-) joint pain,swelling.  Neurologic: (-) epilepsy, (-)seizures,(-) brain tumor,(-) TIA, (-)stroke, (-)headaches, (-)Parkinson disease,(-) memory loss, (-) LOC.  Cardiovascular: (-) Chest pain, (-) swelling in legs/feet, (-) SOB, (-) cramping in legs/feet with walking.    Subjective:    Constitutional:    The patient is alert and oriented x 3, appears to be stated age and in no distress.   There were no vitals taken for this visit.    Skin:  Upon inspection: the skin appears warm, dry and intact.  There is a previous scar over the affected area.There is not any cellulitis, lymphedema or cutaneous lesions noted in the lower extremities.   Upon palpation there is no induration noted.      Neurologic:  Gait: antalgic;  Motor exam of the lower extremities show ; quadriceps, hamstrings, foot dorsi and plantar flexors intact R.  5/5 and L. 5/5. Deep tendon reflexes are 2/4 at the knees and 2/4 at the ankles with strong extensor hallicus longus motor strength bilaterally. Sensory to both feet is intact to

## 2025-07-16 RX ORDER — ATENOLOL 25 MG/1
25 TABLET ORAL EVERY MORNING
Qty: 90 TABLET | Refills: 1 | Status: SHIPPED | OUTPATIENT
Start: 2025-07-16 | End: 2026-01-12

## 2025-07-16 NOTE — TELEPHONE ENCOUNTER
Name of Medication(s) Requested:  Requested Prescriptions     Pending Prescriptions Disp Refills    atenolol (TENORMIN) 25 MG tablet 90 tablet 1     Sig: Take 1 tablet by mouth every morning       Medication is on current medication list Yes    Dosage and directions were verified? Yes    Quantity verified: 90 day supply     Pharmacy Verified?  Yes    Last Appointment:  5/27/2025    Future appts:  Future Appointments   Date Time Provider Department Center   8/11/2025 10:00 AM Aster West MD [place] [facility]   8/12/2025  9:30 AM Huber Christiansen, DO [facility]        (If no appt send self scheduling link. .REFILLAPPT)  Scheduling request sent?     [] Yes  [x] No    Does patient need updated?  [] Yes  [x] No

## 2025-07-20 DIAGNOSIS — I10 ESSENTIAL HYPERTENSION: ICD-10-CM

## 2025-07-20 DIAGNOSIS — E78.5 HYPERLIPIDEMIA, UNSPECIFIED HYPERLIPIDEMIA TYPE: ICD-10-CM

## 2025-07-21 RX ORDER — OLMESARTAN MEDOXOMIL 40 MG/1
40 TABLET ORAL EVERY EVENING
Qty: 90 TABLET | Refills: 1 | OUTPATIENT
Start: 2025-07-21

## 2025-07-21 RX ORDER — HYDROCHLOROTHIAZIDE 12.5 MG/1
12.5 CAPSULE ORAL EVERY EVENING
Qty: 90 CAPSULE | Refills: 1 | OUTPATIENT
Start: 2025-07-21

## 2025-07-21 RX ORDER — ROSUVASTATIN CALCIUM 20 MG/1
20 TABLET, COATED ORAL NIGHTLY
Qty: 90 TABLET | Refills: 1 | OUTPATIENT
Start: 2025-07-21 | End: 2026-01-17

## 2025-07-21 RX ORDER — AMLODIPINE BESYLATE 5 MG/1
5 TABLET ORAL EVERY MORNING
Qty: 90 TABLET | Refills: 1 | OUTPATIENT
Start: 2025-07-21 | End: 2026-01-17

## 2025-07-31 DIAGNOSIS — Z96.651 S/P TOTAL KNEE ARTHROPLASTY, RIGHT: Primary | ICD-10-CM

## 2025-07-31 DIAGNOSIS — Z96.651 S/P TOTAL KNEE ARTHROPLASTY, RIGHT: ICD-10-CM

## 2025-07-31 RX ORDER — OXYCODONE HYDROCHLORIDE 10 MG/1
10 TABLET ORAL EVERY 6 HOURS PRN
Qty: 28 TABLET | Refills: 0 | OUTPATIENT
Start: 2025-07-31 | End: 2025-08-07

## 2025-07-31 RX ORDER — OXYCODONE HYDROCHLORIDE 10 MG/1
10 TABLET ORAL EVERY 6 HOURS PRN
Qty: 28 TABLET | Refills: 0 | Status: SHIPPED | OUTPATIENT
Start: 2025-07-31 | End: 2025-08-07

## 2025-07-31 NOTE — TELEPHONE ENCOUNTER
.Last appointment 7/15/2025  Next appointment   Future Appointments   Date Time Provider Department Center   8/11/2025 10:00 AM [facility] West MD [place] [facility]   8/12/2025  9:30 AM Huber Crhistiansen DO [place] [facility]   11/4/2025  9:30 AM [facility]      Last refill 07/15/2025  DOS: TKA 05/14/2025, RAOUL 06/24/2025      Patient called in requesting refill of :    oxyCODONE HCl (OXY-IR) 10 MG immediate release tablet     [id number], [place] - [address] - P [phone] - F [phone]

## 2025-08-01 DIAGNOSIS — Z96.651 S/P TOTAL KNEE ARTHROPLASTY, RIGHT: Primary | ICD-10-CM

## 2025-08-08 SDOH — HEALTH STABILITY: PHYSICAL HEALTH: ON AVERAGE, HOW MANY MINUTES DO YOU ENGAGE IN EXERCISE AT THIS LEVEL?: 60 MIN

## 2025-08-08 SDOH — HEALTH STABILITY: PHYSICAL HEALTH: ON AVERAGE, HOW MANY DAYS PER WEEK DO YOU ENGAGE IN MODERATE TO STRENUOUS EXERCISE (LIKE A BRISK WALK)?: 5 DAYS

## 2025-08-08 ASSESSMENT — LIFESTYLE VARIABLES
HOW MANY STANDARD DRINKS CONTAINING ALCOHOL DO YOU HAVE ON A TYPICAL DAY: 3
HOW OFTEN DO YOU HAVE SIX OR MORE DRINKS ON ONE OCCASION: 5
HAVE YOU OR SOMEONE ELSE BEEN INJURED AS A RESULT OF YOUR DRINKING: NO
HAVE YOU OR SOMEONE ELSE BEEN INJURED AS A RESULT OF YOUR DRINKING: NO
HOW OFTEN DURING THE LAST YEAR HAVE YOU FAILED TO DO WHAT WAS NORMALLY EXPECTED FROM YOU BECAUSE OF DRINKING: NEVER
HOW OFTEN DURING THE LAST YEAR HAVE YOU BEEN UNABLE TO REMEMBER WHAT HAPPENED THE NIGHT BEFORE BECAUSE YOU HAD BEEN DRINKING: NEVER
HOW OFTEN DO YOU HAVE A DRINK CONTAINING ALCOHOL: 4 OR MORE TIMES A WEEK
HOW OFTEN DURING THE LAST YEAR HAVE YOU BEEN UNABLE TO REMEMBER WHAT HAPPENED THE NIGHT BEFORE BECAUSE YOU HAD BEEN DRINKING: NEVER
HAS A RELATIVE, FRIEND, DOCTOR, OR ANOTHER HEALTH PROFESSIONAL EXPRESSED CONCERN ABOUT YOUR DRINKING OR SUGGESTED YOU CUT DOWN: NO
HOW MANY STANDARD DRINKS CONTAINING ALCOHOL DO YOU HAVE ON A TYPICAL DAY: 5 OR 6
HOW OFTEN DURING THE LAST YEAR HAVE YOU NEEDED AN ALCOHOLIC DRINK FIRST THING IN THE MORNING TO GET YOURSELF GOING AFTER A NIGHT OF HEAVY DRINKING: NEVER
HAS A RELATIVE, FRIEND, DOCTOR, OR ANOTHER HEALTH PROFESSIONAL EXPRESSED CONCERN ABOUT YOUR DRINKING OR SUGGESTED YOU CUT DOWN: NO
HOW OFTEN DURING THE LAST YEAR HAVE YOU FAILED TO DO WHAT WAS NORMALLY EXPECTED FROM YOU BECAUSE OF DRINKING: NEVER
HOW OFTEN DURING THE LAST YEAR HAVE YOU HAD A FEELING OF GUILT OR REMORSE AFTER DRINKING: LESS THAN MONTHLY
HOW OFTEN DURING THE LAST YEAR HAVE YOU FOUND THAT YOU WERE NOT ABLE TO STOP DRINKING ONCE YOU HAD STARTED: NEVER
HOW OFTEN DURING THE LAST YEAR HAVE YOU NEEDED AN ALCOHOLIC DRINK FIRST THING IN THE MORNING TO GET YOURSELF GOING AFTER A NIGHT OF HEAVY DRINKING: NEVER
HOW OFTEN DO YOU HAVE A DRINK CONTAINING ALCOHOL: 5
HOW OFTEN DURING THE LAST YEAR HAVE YOU FOUND THAT YOU WERE NOT ABLE TO STOP DRINKING ONCE YOU HAD STARTED: NEVER
HOW OFTEN DURING THE LAST YEAR HAVE YOU HAD A FEELING OF GUILT OR REMORSE AFTER DRINKING: LESS THAN MONTHLY

## 2025-08-08 ASSESSMENT — PATIENT HEALTH QUESTIONNAIRE - PHQ9
SUM OF ALL RESPONSES TO PHQ QUESTIONS 1-9: 2
SUM OF ALL RESPONSES TO PHQ QUESTIONS 1-9: 2
1. LITTLE INTEREST OR PLEASURE IN DOING THINGS: SEVERAL DAYS
SUM OF ALL RESPONSES TO PHQ QUESTIONS 1-9: 2
SUM OF ALL RESPONSES TO PHQ QUESTIONS 1-9: 2
2. FEELING DOWN, DEPRESSED OR HOPELESS: SEVERAL DAYS

## 2025-08-11 ENCOUNTER — OFFICE VISIT (OUTPATIENT)
Dept: FAMILY MEDICINE CLINIC | Age: 65
End: 2025-08-11

## 2025-08-11 VITALS
HEART RATE: 69 BPM | WEIGHT: 176.4 LBS | DIASTOLIC BLOOD PRESSURE: 82 MMHG | BODY MASS INDEX: 25.25 KG/M2 | HEIGHT: 70 IN | SYSTOLIC BLOOD PRESSURE: 138 MMHG | OXYGEN SATURATION: 98 % | TEMPERATURE: 98.3 F

## 2025-08-11 DIAGNOSIS — E78.5 HYPERLIPIDEMIA, UNSPECIFIED HYPERLIPIDEMIA TYPE: ICD-10-CM

## 2025-08-11 DIAGNOSIS — Z00.00 INITIAL MEDICARE ANNUAL WELLNESS VISIT: Primary | ICD-10-CM

## 2025-08-11 DIAGNOSIS — D64.9 ANEMIA, UNSPECIFIED TYPE: ICD-10-CM

## 2025-08-11 DIAGNOSIS — I10 ESSENTIAL HYPERTENSION: ICD-10-CM

## 2025-08-11 RX ORDER — ATENOLOL 25 MG/1
25 TABLET ORAL EVERY MORNING
Qty: 90 TABLET | Refills: 1 | Status: SHIPPED | OUTPATIENT
Start: 2025-08-11 | End: 2026-02-07

## 2025-08-11 RX ORDER — HYDROCHLOROTHIAZIDE 12.5 MG/1
12.5 CAPSULE ORAL EVERY EVENING
Qty: 90 CAPSULE | Refills: 1 | Status: SHIPPED | OUTPATIENT
Start: 2025-08-11

## 2025-08-11 RX ORDER — SENNOSIDES 8.8 MG/5ML
5 LIQUID ORAL NIGHTLY
COMMUNITY

## 2025-08-11 RX ORDER — AMLODIPINE BESYLATE 5 MG/1
5 TABLET ORAL EVERY MORNING
Qty: 90 TABLET | Refills: 1 | Status: SHIPPED | OUTPATIENT
Start: 2025-08-11 | End: 2026-02-07

## 2025-08-11 RX ORDER — OLMESARTAN MEDOXOMIL 40 MG/1
40 TABLET ORAL EVERY EVENING
Qty: 90 TABLET | Refills: 1 | Status: SHIPPED | OUTPATIENT
Start: 2025-08-11

## 2025-08-11 RX ORDER — ROSUVASTATIN CALCIUM 20 MG/1
20 TABLET, COATED ORAL NIGHTLY
Qty: 90 TABLET | Refills: 1 | Status: SHIPPED | OUTPATIENT
Start: 2025-08-11 | End: 2026-02-07

## 2025-08-12 ENCOUNTER — OFFICE VISIT (OUTPATIENT)
Dept: ORTHOPEDIC SURGERY | Age: 65
End: 2025-08-12

## 2025-08-12 VITALS — WEIGHT: 176 LBS | BODY MASS INDEX: 25.2 KG/M2 | HEIGHT: 70 IN

## 2025-08-12 DIAGNOSIS — Z96.651 S/P TOTAL KNEE ARTHROPLASTY, RIGHT: Primary | ICD-10-CM

## 2025-08-12 RX ORDER — OXYCODONE HYDROCHLORIDE 10 MG/1
10 TABLET ORAL EVERY 6 HOURS PRN
Qty: 28 TABLET | Refills: 0 | Status: SHIPPED | OUTPATIENT
Start: 2025-08-12 | End: 2025-08-19

## 2025-08-19 DIAGNOSIS — Z96.651 S/P TOTAL KNEE ARTHROPLASTY, RIGHT: Primary | ICD-10-CM

## 2025-08-22 ENCOUNTER — TELEPHONE (OUTPATIENT)
Dept: ORTHOPEDIC SURGERY | Age: 65
End: 2025-08-22

## 2025-08-22 DIAGNOSIS — Z96.651 S/P TOTAL KNEE ARTHROPLASTY, RIGHT: Primary | ICD-10-CM

## 2025-08-28 DIAGNOSIS — Z96.651 S/P TOTAL KNEE ARTHROPLASTY, RIGHT: ICD-10-CM

## 2025-08-28 RX ORDER — OXYCODONE HYDROCHLORIDE 10 MG/1
10 TABLET ORAL EVERY 6 HOURS PRN
Qty: 28 TABLET | Refills: 0 | Status: SHIPPED | OUTPATIENT
Start: 2025-08-28 | End: 2025-09-04

## (undated) DEVICE — MEDI-VAC NON-CONDUCTIVE SUCTION TUBING: Brand: CARDINAL HEALTH

## (undated) DEVICE — TOTAL KNEE PACK: Brand: MEDLINE INDUSTRIES, INC.

## (undated) DEVICE — CONTAINER SPEC COLL 960ML POLYPR TRIANG GRAD INTAKE/OUTPUT

## (undated) DEVICE — 12 ML SYRINGE,LUER-LOCK TIP: Brand: MONOJECT

## (undated) DEVICE — KIT DRP FOR RIO ROBOTIC ARM ASST SYS

## (undated) DEVICE — BLADE CLIPPER GEN PURP NS

## (undated) DEVICE — BLADE SHV L13CM DIA4MM TAPR TIP SCIS LIKE CUT OVL OUTER

## (undated) DEVICE — 3M™ COBAN™ SELF-ADHERENT WRAP, 1586S, STERILE, 6 IN X 5 YD (15 CM X 4,5 M), 12 ROLLS/CASE: Brand: 3M™ COBAN™

## (undated) DEVICE — CHLORAPREP 26ML ORANGE

## (undated) DEVICE — TUBING PMP L16FT MAIN DISP FOR AR-6400 AR-6475

## (undated) DEVICE — FORCEPS BX L240CM JAW DIA2.8MM L CAP W/ NDL MIC MESH TOOTH

## (undated) DEVICE — PIN BNE FIX TEMP L140MM DIA4MM MAKO

## (undated) DEVICE — DRESSING GZ XRFRM 4X4(25/BX 6BX/CS)

## (undated) DEVICE — 450 ML BOTTLE OF 0.05% CHLORHEXIDINE GLUCONATE IN 99.95% STERILE WATER FOR IRRIGATION, USP AND APPLICATOR.: Brand: IRRISEPT ANTIMICROBIAL WOUND LAVAGE

## (undated) DEVICE — 2108 SERIES SAGITTAL BLADE (28.9 X 0.64 X 58.7MM)

## (undated) DEVICE — Device: Brand: DEFENDO VALVE AND CONNECTOR KIT

## (undated) DEVICE — CUFF TOURNIQUET 34 SNG BLADDER DUAL PORT

## (undated) DEVICE — Z CONVERTED USE 2275207 CLOTH PREP W7.5XL7.5IN 2% CHG SKIN ALC AND RNS FREE

## (undated) DEVICE — SOLUTION IRRIG 1000ML 0.9% SOD CHL USP POUR PLAS BTL

## (undated) DEVICE — GAUZE,SPONGE,4"X4",16PLY,XRAY,STRL,LF: Brand: MEDLINE

## (undated) DEVICE — SUTURE PROL SZ 3-0 L18IN NONABSORBABLE BLU L19MM PS-2 3/8 8687H

## (undated) DEVICE — STANDARD HYPODERMIC NEEDLE,POLYPROPYLENE HUB: Brand: MONOJECT

## (undated) DEVICE — GAUZE,SPONGE,4"X4",16PLY,STRL,LF,10/TRAY: Brand: MEDLINE

## (undated) DEVICE — TUBING PMP L16FT MAIN DISP FOR AR-6400 AR-6475 Â€“ ORDER MULTIPLES OF 10 EACH

## (undated) DEVICE — WIPES SKIN CLOTH READYPREP 9 X 10.5 IN 2% CHLORHEX GLUCONATE CHG PREOP

## (undated) DEVICE — STERILE VELCLOSE ELASTIC BANDAGE, 4IN X 10 YARDS: Brand: VELCLOSE

## (undated) DEVICE — PEN: MARKING STD 100/CS: Brand: MEDICAL ACTION INDUSTRIES

## (undated) DEVICE — CLOTH PREP W7.5XL7.5IN 2% CHG SKIN ALC AND RNS FREE

## (undated) DEVICE — NEEDLE HYPO 18GA L1.5IN PNK POLYPR HUB S STL THN WALL FILL

## (undated) DEVICE — KIT TRK KNEE PROC VIZADISC

## (undated) DEVICE — APPLICATOR MEDICATED 26 CC SOLUTION HI LT ORNG CHLORAPREP

## (undated) DEVICE — SUTURE SUTTAPE L40IN DIA1.3MM NONABSORBABLE WHT BLU L26.5MM AR7500

## (undated) DEVICE — INTENDED FOR TISSUE SEPARATION, AND OTHER PROCEDURES THAT REQUIRE A SHARP SURGICAL BLADE TO PUNCTURE OR CUT.: Brand: BARD-PARKER ® STAINLESS STEEL BLADES

## (undated) DEVICE — AIR/WATER CLEANING ADAPTER FOR OLYMPUS® GI ENDOSCOPE: Brand: BULLDOG®

## (undated) DEVICE — JELLY,LUBE,STERILE,FLIP TOP,TUBE,4-OZ: Brand: MEDLINE

## (undated) DEVICE — 4-PORT MANIFOLD: Brand: NEPTUNE 2

## (undated) DEVICE — BLADE,STAINLESS-STEEL,15,STRL,DISPOSABLE: Brand: MEDLINE

## (undated) DEVICE — BANDAGE COMPR W6XL12FT SGL LAYERED NO CLSR EXSANGUATION

## (undated) DEVICE — NEEDLE SPNL L3.5IN PNK HUB S STL REG WALL FIT STYL W/ QNCKE

## (undated) DEVICE — COUNTER NDL W1.5XL2.5IN 10 COUNT

## (undated) DEVICE — BANDAGE COMPR W6INXL5YD SELF ADH COHESIVE CO FLX

## (undated) DEVICE — KIT INT FIX FEM TIB CKPT MAKOPLASTY

## (undated) DEVICE — TUBING, SUCTION, 1/4" X 10', STRAIGHT: Brand: MEDLINE

## (undated) DEVICE — ELECTRODE PT RET AD L9FT HI MOIST COND ADH HYDRGEL CORDED

## (undated) DEVICE — SOLUTION IV IRRIG POUR BRL 0.9% SODIUM CHL 2F7124

## (undated) DEVICE — BAG SPECIMEN BIOHAZARD 6IN X 9IN

## (undated) DEVICE — SOLUTION WND IRRIGATION 450 ML 0.5 PVP-I 0.9 NACL

## (undated) DEVICE — GLOVE ORTHO 8   MSG9480

## (undated) DEVICE — KIT BEDSIDE REVITAL OX 500ML

## (undated) DEVICE — SOL IRR SOD CHL 0.9% TITAN XL CNTNR 3000ML

## (undated) DEVICE — BLADE SURG SAW S STL NAR OSC W/ SERR EDGE DISP

## (undated) DEVICE — PADDING CAST W4INXL4YD NONSTERILE COT COHESIVE HND TEARABLE

## (undated) DEVICE — GLOVE ORANGE PI 8   MSG9080

## (undated) DEVICE — SMARTGOWN BREATHABLE SPECIALTY GOWN: Brand: CONVERTORS

## (undated) DEVICE — KENDALL 450 SERIES MONITORING FOAM ELECTRODE - RECTANGULAR SHAPE ( 3/PK): Brand: KENDALL

## (undated) DEVICE — BLADE SURG SAW STD S STL OSC W/ SERR EDGE DISP

## (undated) DEVICE — TUBING, SUCTION, 3/16" X 10', STRAIGHT: Brand: MEDLINE

## (undated) DEVICE — NEEDLE HYPO 18GA L1.5IN PNK POLYPR HUB S STL REG BVL STR

## (undated) DEVICE — HANDPIECE SET WITH BONE CLEANING TIP: Brand: INTERPULSE

## (undated) DEVICE — PACK PROC ORTH LO EXT IX CUST

## (undated) DEVICE — BASIC DOUBLE BASIN 2-LF: Brand: MEDLINE INDUSTRIES, INC.

## (undated) DEVICE — GOWN SIRUS NONREIN XL W/TWL: Brand: MEDLINE INDUSTRIES, INC.

## (undated) DEVICE — GOWN ISOLATN XL BLU POLYPR OVR HD OPN BK KNIT CUF PROTCT

## (undated) DEVICE — CORD RETRCT SIL - ORDER MULTIPLES OF 10 EACH

## (undated) DEVICE — DRESSING HYDROFIBER AQUACEL AG ADVANTAGE 3.5X12 IN

## (undated) DEVICE — MASK,FACE,MAXFLUIDPROTECT,SHIELD/ERLPS: Brand: MEDLINE

## (undated) DEVICE — SPONGE GZ 4IN 4IN 4 PLY N WVN AVANT

## (undated) DEVICE — KIT POS LEG DISP

## (undated) DEVICE — 3 BONE CEMENT MIXER: Brand: MIXEVAC

## (undated) DEVICE — GARMENT,MEDLINE,DVT,INT,CALF,MED, GEN2: Brand: MEDLINE

## (undated) DEVICE — SOLUTION SCRB 4OZ 7.5% POVIDONE IOD ANTIMIC BTL

## (undated) DEVICE — SUTURE STRATAFIX SYMMETRIC SZ 1 L18IN ABSRB VLT CT1 L36CM SXPP1A404